# Patient Record
Sex: FEMALE | Race: WHITE | NOT HISPANIC OR LATINO | Employment: FULL TIME | URBAN - METROPOLITAN AREA
[De-identification: names, ages, dates, MRNs, and addresses within clinical notes are randomized per-mention and may not be internally consistent; named-entity substitution may affect disease eponyms.]

---

## 2017-02-13 ENCOUNTER — TRANSFERRED RECORDS (OUTPATIENT)
Dept: HEALTH INFORMATION MANAGEMENT | Facility: CLINIC | Age: 47
End: 2017-02-13

## 2018-05-18 ENCOUNTER — TRANSFERRED RECORDS (OUTPATIENT)
Dept: HEALTH INFORMATION MANAGEMENT | Facility: CLINIC | Age: 48
End: 2018-05-18

## 2018-05-23 ENCOUNTER — TRANSFERRED RECORDS (OUTPATIENT)
Dept: HEALTH INFORMATION MANAGEMENT | Facility: CLINIC | Age: 48
End: 2018-05-23

## 2018-06-25 ENCOUNTER — TRANSFERRED RECORDS (OUTPATIENT)
Dept: HEALTH INFORMATION MANAGEMENT | Facility: CLINIC | Age: 48
End: 2018-06-25

## 2018-07-25 ENCOUNTER — TRANSFERRED RECORDS (OUTPATIENT)
Dept: HEALTH INFORMATION MANAGEMENT | Facility: CLINIC | Age: 48
End: 2018-07-25

## 2019-06-13 ENCOUNTER — TRANSFERRED RECORDS (OUTPATIENT)
Dept: HEALTH INFORMATION MANAGEMENT | Facility: CLINIC | Age: 49
End: 2019-06-13

## 2019-09-06 ENCOUNTER — TRANSFERRED RECORDS (OUTPATIENT)
Dept: HEALTH INFORMATION MANAGEMENT | Facility: CLINIC | Age: 49
End: 2019-09-06

## 2019-10-04 ENCOUNTER — TRANSFERRED RECORDS (OUTPATIENT)
Dept: HEALTH INFORMATION MANAGEMENT | Facility: CLINIC | Age: 49
End: 2019-10-04

## 2020-07-28 ENCOUNTER — TRANSFERRED RECORDS (OUTPATIENT)
Dept: HEALTH INFORMATION MANAGEMENT | Facility: CLINIC | Age: 50
End: 2020-07-28

## 2020-08-04 ENCOUNTER — TRANSFERRED RECORDS (OUTPATIENT)
Dept: HEALTH INFORMATION MANAGEMENT | Facility: CLINIC | Age: 50
End: 2020-08-04

## 2020-08-05 ENCOUNTER — TRANSFERRED RECORDS (OUTPATIENT)
Dept: HEALTH INFORMATION MANAGEMENT | Facility: CLINIC | Age: 50
End: 2020-08-05

## 2020-08-06 ENCOUNTER — TRANSFERRED RECORDS (OUTPATIENT)
Dept: HEALTH INFORMATION MANAGEMENT | Facility: CLINIC | Age: 50
End: 2020-08-06
Payer: COMMERCIAL

## 2020-08-21 ENCOUNTER — TRANSFERRED RECORDS (OUTPATIENT)
Dept: HEALTH INFORMATION MANAGEMENT | Facility: CLINIC | Age: 50
End: 2020-08-21

## 2020-08-25 ENCOUNTER — TRANSFERRED RECORDS (OUTPATIENT)
Dept: HEALTH INFORMATION MANAGEMENT | Facility: CLINIC | Age: 50
End: 2020-08-25

## 2020-08-28 ENCOUNTER — TRANSFERRED RECORDS (OUTPATIENT)
Dept: HEALTH INFORMATION MANAGEMENT | Facility: CLINIC | Age: 50
End: 2020-08-28

## 2020-09-16 NOTE — TELEPHONE ENCOUNTER
RECORDS RECEIVED FROM: Right shoulder pain / self / no imaging / no surgery / [employer]   DATE RECEIVED: Sep 23, 2020     NOTES STATUS DETAILS   09/16/20   9:28 AM   No images or records  Sonja Serrano CMA

## 2020-09-18 DIAGNOSIS — M25.511 RIGHT SHOULDER PAIN, UNSPECIFIED CHRONICITY: Primary | ICD-10-CM

## 2020-09-23 ENCOUNTER — PRE VISIT (OUTPATIENT)
Dept: ORTHOPEDICS | Facility: CLINIC | Age: 50
End: 2020-09-23

## 2021-06-15 NOTE — TELEPHONE ENCOUNTER
REFERRAL INFORMATION:    Referring Provider:  N/A    Referring Clinic:  N/A    Reason for Visit/Diagnosis: Colitis      FUTURE VISIT INFORMATION:    Appointment Date: 8/19/2021    Appointment Time: 10 AM      NOTES STATUS DETAILS   OFFICE NOTE from Referring Provider N/A    OFFICE NOTE from Other Specialist Care Everywhere/ Received  8/28/19 Office visit with Dr. Dulce Maria Vargas ([facility])     8/9/15 Urgent Care visit with Dr. Harsh Kirkpatrick ([facility])     [facility]:  - 8/25/2020, 8/5/2020, 7/24/19, 9/25/18, 7/25/18 Office visit with Dr. Aleisha Peralta  - 6/25/18, 5/18/18, 2/20/17 Office visit with Dr. Kirby Pal   - 2/1/17, 3/15/16 Office visit with SHAKIRA Martinez  - 5/30/07 Office visit with Tricia Yoder PA-C     HOSPITAL DISCHARGE SUMMARY/  ED VISITS Care Everywhere 8/4/2020 ([facility])    OPERATIVE REPORT Received  Sigmoidoscopy: 5/23/18, 2/13/17 ([facility])    MEDICATION LIST Care Everywhere         ENDOSCOPY  Received  EGD: 8/21/2020 ([facility])   COLONOSCOPY Received  8/21/2020, 6/13/19, 4/23/07 ([facility])    ERCP N/A    EUS N/A    STOOL TESTING N/A    PERTINENT LABS Care Everywhere    PATHOLOGY REPORTS (RELATED) Received  8/21/2020, 6/13/19, 5/23/18, 2/13/17, 4/23/07 ([facility])      IMAGING (CT, MRI, EGD, MRCP, Small Bowel Follow Through/SBT, MR/CT Enterography) Care Everywhere/ Received  [facility]:  - XR Small Bowel: 8/6/2020    [facility]:  - CT Abdomen Pelvis: 8/4/2020     6/10/2021 10:22am Received recs from [facility]: sent to scan. Leroy     7/5/2021 1:04pm Fax request sent to [facility] and [facility] for images. -Kandis

## 2021-06-20 ENCOUNTER — HEALTH MAINTENANCE LETTER (OUTPATIENT)
Age: 51
End: 2021-06-20

## 2021-08-19 ENCOUNTER — VIRTUAL VISIT (OUTPATIENT)
Dept: GASTROENTEROLOGY | Facility: CLINIC | Age: 51
End: 2021-08-19
Payer: COMMERCIAL

## 2021-08-19 ENCOUNTER — PRE VISIT (OUTPATIENT)
Dept: GASTROENTEROLOGY | Facility: CLINIC | Age: 51
End: 2021-08-19

## 2021-08-19 VITALS — WEIGHT: 116 LBS

## 2021-08-19 DIAGNOSIS — K51.50 LEFT SIDED ULCERATIVE COLITIS WITHOUT COMPLICATION (H): Primary | ICD-10-CM

## 2021-08-19 DIAGNOSIS — K59.00 CONSTIPATION, UNSPECIFIED CONSTIPATION TYPE: ICD-10-CM

## 2021-08-19 PROCEDURE — 99204 OFFICE O/P NEW MOD 45 MIN: CPT | Mod: 95 | Performed by: INTERNAL MEDICINE

## 2021-08-19 RX ORDER — MESALAMINE 0.38 G/1
CAPSULE, EXTENDED RELEASE ORAL
COMMUNITY
Start: 2018-06-01 | End: 2021-11-17

## 2021-08-19 NOTE — LETTER
Date:November 10, 2021      Patient was self referred, no letter generated. Do not send.        [facility] Health Information

## 2021-08-19 NOTE — LETTER
"    8/19/2021         RE: Ceci Lopez  [address]  [place] [address]        Dear Colleague,    Thank you for referring your patient, Ceci Lopez, to the [facility] [place]. Please see a copy of my visit note below.    Ceci Lopez is a 50 year old female who is being evaluated via a billable video visit.      The patient has been notified of following:     \"This video visit will be conducted via a call between you and your physician/provider. We have found that certain health care needs can be provided without the need for an in-person physical exam.  This service lets us provide the care you need with a video conversation.  If a prescription is necessary we can send it directly to your pharmacy.  If lab work is needed we can place an order for that and you can then stop by our lab to have the test done at a later time.    If during the course of the call the physician/provider feels a video visit is not appropriate, you will not be charged for this service.\"     Patient confirmed that they are in [place] for today's visit yes.    Video-Visit Details  Type of service:  Video Visit    Video Start Time: 9:54 AM  Video End Time:  10:35 AM    Originating Location (pt. Location): Home    Distant Location (provider location):  [facility] [place]     Platform used: MyGoodPoints              IBD CLINIC VISIT     CC/REFERRING MD:  Referred Self  REASON FOR CONSULTATION: Ulcerative Colitis     ASSESSMENT/PLAN    IBD HISTORY  Age at diagnosis: 2005 (onset of symptoms)  Extent of disease: Left sided   Current UC medications: Apriso  Prior UC surgeries: None  Prior IBD Medications:  Canasa  Prednisone  Delzicol    DRUG MONITORING  TPMT enzyme activity: Pending    6-TGN/6-MMPN levels: ---    Biologic concentration: --    DISEASE ASSESSMENT  Labs:  No lab results found.    Invalid input(s):  ALB,  HGB  Endoscopic assessment: Last in Aug 2020 (as " below)  Enterography: None  Fecal calprotectin: Pending  C diff: No    sIBDQ:  No flowsheet data found.     #. Ulcerative colitis  At this point, likely in clinical remission. However, proctitis can also present with constipation, so will plan for repeat endoscopic assessment prior to pelvic floor center evaluation. Given prior history of aphthous ulcers in TI, will plan for complete colonoscopy instead of flex sig, particularly now that pt is now off of NSAIDs completely.  Plan to continue Apriso for now.       #. Constipation  One BM max daily with Miralax and PRN Bisacodyl.   Some mild of incomplete evacuation and straining.   No prior child births or trauma.   DDX includes pelvic floor dysfunction, will place referral         IBD Health Care Maintenance:    Vaccinations:  All patients on biologics should avoid live vaccines.    -- Influenza (every year)  -- TdaP (every 10 years)  -- Pneumococcal Pneumonia (once plus booster at 5 years)  -- Yearly assessment for latent Tb (verbal screening and exam, PPD or QuantiFERON-Tb testing)    One time confirmation of immunity or serologies:  -- Hepatitis A (serologies or immunizations)  -- Hepatitis B (serologies or immunizations)  -- Varicella  -- MMR  -- HPV (all aged 18-26)  -- Meningococcal meningitis (all patients at risk for meningitis)    Bone mineral density screening   -- Recommend all patients supplement with calcium and vitamin D  -- Given prior steroid use recommend DEXA if not already done    Cancer Screening:  Colon cancer screening:  Last colonoscopy 8/2020 with negative dysplasia surveillance.     Cervical cancer screening: Per OBGYN    Skin cancer screening: Annual visual exam of skin by dermatologist since patient is immunocompromised    Depression Screening:  -- Over the last month, have you felt down, depressed, or hopeless? --  -- Over the last month, have you felt little interest or pleasure doing things? --    Misc:  -- Avoid tobacco use  -- Avoid  NSAIDs as there is potentially a 25% chance of causing an IBD flare    Return to clinic in 3 months    Thank you for this consultation.  It was a pleasure to participate in the care of this patient; please contact us with any further questions.  I spent a total of 35 minutes, face to face, was spent with this patient, >50% of which was counseling regarding the above delineated issues.    This note was created with voice recognition software, and while reviewed for accuracy, typos may remain.     This patient was seen and staffed with my attending, Dr. Cain.    Sierra Vee MD  Gastroenterology Fellow  Pager [phone]          HPI:     Barnhart score:   Stool freq: 0 (baseline stools frequency)  Rectal bleedin (None)  PGA: 1 (Mild)  Endoscopy: 1 (Mild)    Currently has 1BM in AM if takes Miralax at night. It gets smaller and smaller each day. It's like knocking a few books off hte shelf, but not the whole shelf. She eats a lot of fiber, drinks a lot of water, and exercises a lot. She describes it as sludgy. She endorses a small bowel movement that is not satisfying. Then she ends up taking a bisacodyl once every 2 weeks and that gets things going again to have a satisfying stool.     Had colitis flare in 2018, then more consistently followed up with [facility]. Last summer had some issues, ended up in the hospital. Wanted to get a second opinion, hoping to establish care.     3 main questions:   1) Long term management - 3 years for mesalamine  2) Is it constipation? Trouble going to the bathroom regularly; she actually has to take 1.5 doses of Miralax daily for one year in order to have regular bowel movements. She has been told by her prior GI doctor that some people   3) It's been on year since last visit; any blood work? She is vegan. Any diet related advice.     In , initial onset of rectal bleeding, urgency. She was diagnosed with ulcerative proctitis, given Canasa suppositories, which resolved her  "symptoms.   In May 2018, she had a \"colitis flare.\" She was very sick, but was on prednisone. After that, she has been on mesalamine since then.   One year ago, she was hospitalized for abnormal CT findings (concern for stricture or obstruction at IC valve). A small bowel follow through did not reveal any evidence of stricture. She had a colon cleanse while in the hospital (liquid diet and enema), and felt much better.      She recently switched from Delzicol to Apriso.    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 Never pregnant, no , prolonged labor.   Do you ever have to insert a digit into     ROS:    No fevers or chills  No weight loss (weight stable - 5 years ago lost 60lbs with healthy diet and exercise)  No blurry vision, double vision or change in vision  No sore throat  No lymphadenopathy (swelling in neck)  No headache, paraesthesias, or weakness in a limb  No shortness of breath or wheezing  No chest pain or pressure  No arthralgias or myalgias  No rashes or skin changes  No odynophagia or dysphagia  No BRBPR, hematochezia, melena    Extra intestinal manifestations of IBD:   No uveitis/episcleritis  No aphthous ulcers   No arthritis   No erythema nodosum/pyoderma gangrenosum.     PERTINENT PAST MEDICAL HISTORY:  No past medical history on file.    PREVIOUS SURGERIES:  No past surgical history on file.    PREVIOUS ENDOSCOPY:  2020 EGD and Colonoscopy  EGD was normal. Gastric and duodenal biopsies were negative.    Colonoscopy showed 4 aphthous ulcers in the terminal ileum. Some minimal erythema in the distal rectum. Biopsies of the ileum revealed some nonspecific minimally active chornic ileitis " possibly due to backwash ileitis per pathologist. The rectal biopsy showed some minimally active UC, but the remainder of the colon biopsies were negative.     ALLERGIES:   No Known Allergies    PERTINENT MEDICATIONS:    Current Outpatient Medications:      mesalamine (APRISO ER) 0.375 g 24 hr capsule, , Disp: , Rfl:     SOCIAL HISTORY:  Social History     Socioeconomic History     Marital status:      Spouse name: Not on file     Number of children: Not on file     Years of education: Not on file     Highest education level: Not on file   Occupational History     Not on file   Tobacco Use     Smoking status: Never Smoker     Smokeless tobacco: Never Used   Substance and Sexual Activity     Alcohol use: Not on file     Drug use: Not on file     Sexual activity: Not on file   Other Topics Concern     Not on file   Social History Narrative     Not on file     Social Determinants of Health     Financial Resource Strain:      Difficulty of Paying Living Expenses:    Food Insecurity:      Worried About Running Out of Food in the Last Year:      Ran Out of Food in the Last Year:    Transportation Needs:      Lack of Transportation (Medical):      Lack of Transportation (Non-Medical):    Physical Activity:      Days of Exercise per Week:      Minutes of Exercise per Session:    Stress:      Feeling of Stress :    Social Connections:      Frequency of Communication with Friends and Family:      Frequency of Social Gatherings with Friends and Family:      Attends Shinto Services:      Active Member of Clubs or Organizations:      Attends Club or Organization Meetings:      Marital Status:    Intimate Partner Violence:      Fear of Current or Ex-Partner:      Emotionally Abused:      Physically Abused:      Sexually Abused:        FAMILY HISTORY:  No family history on file.    Past/family/social history reviewed and no changes    PHYSICAL EXAMINATION:  Constitutional: aaox3, cooperative, pleasant, not  dyspneic/diaphoretic, no acute distress  Vitals reviewed: Wt 52.6 kg (116 lb)   Wt:   Wt Readings from Last 2 Encounters:   08/19/21 52.6 kg (116 lb)      Eyes: Sclera anicteric/injected  Ears/nose/mouth/throat: MMM  Respiratory: Unlabored breathing  Skin: No jaundice  Psych: Normal affect      PERTINENT STUDIES:  Most recent CBC:  No lab results found.  Most recent hepatic panel:  No lab results found.    Invalid input(s): MATTHEW, ALP   Most recent creatinine:      No lab results found.        Attestation signed by Ruperto Cain MD at 8/25/2021  4:50 PM:  ATTENDING ATTESTATION:    Virtual visit for patient conducted via three way video conference with myself, patient and Dr. Vee.   I reviewed the history and abbreviated physical exam and discussed the management with Dr. Vee on 8/19/21. I reviewed the note and there are no changes to the past medical, family or social history.  A complete 10 point review of systems was obtained. Please see the HPI for pertinent positives and negatives. All other systems were reviewed and were found to be negative. I agree with the documented findings and plan of care as outlined.    UC - symptomatically doing well. Will restage. Continue current therapies    Constipation - miralax and PRN bisacodyl. Pelvic floor referral.    She agrees to the plan.    Ruperto Cain MD        Again, thank you for allowing me to participate in the care of your patient.        Sincerely,        Ruperto Cain MD

## 2021-08-19 NOTE — NURSING NOTE
Chief Complaint   Patient presents with     New Patient       Vitals:    08/19/21 0912   Weight: 52.6 kg (116 lb)       There is no height or weight on file to calculate BMI.    Anna Diaz, CMA

## 2021-08-19 NOTE — PATIENT INSTRUCTIONS
Was very nice to meet with you today during your virtual visit.  I have outlined our recommendations below.    Please go to your local [facility] clinic with the lab to get blood work drawn and to get your stool study.    My office will contact you about scheduling a colonoscopy.    My office will contact you about scheduling a referral to the pelvic floor center.    My office will contact you about scheduling your visit with a nutritionist.    Please continue to take your apprised of 4 pills daily.  If you need a refill let us know.    Please continue to take your MiraLAX.  You can consider adding daily Citrucel or Metamucil fiber powder to your regimen.    Follow-up in 3 to 4 months.

## 2021-08-19 NOTE — PROGRESS NOTES
"Ceci Lopez is a 50 year old female who is being evaluated via a billable video visit.      The patient has been notified of following:     \"This video visit will be conducted via a call between you and your physician/provider. We have found that certain health care needs can be provided without the need for an in-person physical exam.  This service lets us provide the care you need with a video conversation.  If a prescription is necessary we can send it directly to your pharmacy.  If lab work is needed we can place an order for that and you can then stop by our lab to have the test done at a later time.    If during the course of the call the physician/provider feels a video visit is not appropriate, you will not be charged for this service.\"     Patient confirmed that they are in [place] for today's visit yes.    Video-Visit Details  Type of service:  Video Visit    Video Start Time: 9:54 AM  Video End Time:  10:35 AM    Originating Location (pt. Location): Home    Distant Location (provider location):  [facility] [place]     Platform used: [employer]ett            "

## 2021-08-19 NOTE — PROGRESS NOTES
IBD CLINIC VISIT     CC/REFERRING MD:  Referred Self  REASON FOR CONSULTATION: Ulcerative Colitis     ASSESSMENT/PLAN    IBD HISTORY  Age at diagnosis: 2005 (onset of symptoms)  Extent of disease: Left sided   Current UC medications: Apriso  Prior UC surgeries: None  Prior IBD Medications:  Canasa  Prednisone  Delzicol    DRUG MONITORING  TPMT enzyme activity: Pending    6-TGN/6-MMPN levels: ---    Biologic concentration: --    DISEASE ASSESSMENT  Labs:  No lab results found.    Invalid input(s):  ALB,  HGB  Endoscopic assessment: Last in Aug 2020 (as below)  Enterography: None  Fecal calprotectin: Pending  C diff: No    sIBDQ:  No flowsheet data found.     #. Ulcerative colitis  At this point, likely in clinical remission. However, proctitis can also present with constipation, so will plan for repeat endoscopic assessment prior to pelvic floor center evaluation. Given prior history of aphthous ulcers in TI, will plan for complete colonoscopy instead of flex sig, particularly now that pt is now off of NSAIDs completely.  Plan to continue Apriso for now.       #. Constipation  One BM max daily with Miralax and PRN Bisacodyl.   Some mild of incomplete evacuation and straining.   No prior child births or trauma.   DDX includes pelvic floor dysfunction, will place referral         IBD Health Care Maintenance:    Vaccinations:  All patients on biologics should avoid live vaccines.    -- Influenza (every year)  -- TdaP (every 10 years)  -- Pneumococcal Pneumonia (once plus booster at 5 years)  -- Yearly assessment for latent Tb (verbal screening and exam, PPD or QuantiFERON-Tb testing)    One time confirmation of immunity or serologies:  -- Hepatitis A (serologies or immunizations)  -- Hepatitis B (serologies or immunizations)  -- Varicella  -- MMR  -- HPV (all aged 18-26)  -- Meningococcal meningitis (all patients at risk for meningitis)    Bone mineral density screening   -- Recommend all patients supplement with  calcium and vitamin D  -- Given prior steroid use recommend DEXA if not already done    Cancer Screening:  Colon cancer screening:  Last colonoscopy 2020 with negative dysplasia surveillance.     Cervical cancer screening: Per OBGYN    Skin cancer screening: Annual visual exam of skin by dermatologist since patient is immunocompromised    Depression Screening:  -- Over the last month, have you felt down, depressed, or hopeless? --  -- Over the last month, have you felt little interest or pleasure doing things? --    Misc:  -- Avoid tobacco use  -- Avoid NSAIDs as there is potentially a 25% chance of causing an IBD flare    Return to clinic in 3 months    Thank you for this consultation.  It was a pleasure to participate in the care of this patient; please contact us with any further questions.  I spent a total of 35 minutes, face to face, was spent with this patient, >50% of which was counseling regarding the above delineated issues.    This note was created with voice recognition software, and while reviewed for accuracy, typos may remain.     This patient was seen and staffed with my attending, Dr. Cain.    Sierra Vee MD  Gastroenterology Fellow  Pager [phone]          HPI:     Barnhart score:   Stool freq: 0 (baseline stools frequency)  Rectal bleedin (None)  PGA: 1 (Mild)  Endoscopy: 1 (Mild)    Currently has 1BM in AM if takes Miralax at night. It gets smaller and smaller each day. It's like knocking a few books off hte shelf, but not the whole shelf. She eats a lot of fiber, drinks a lot of water, and exercises a lot. She describes it as sludgy. She endorses a small bowel movement that is not satisfying. Then she ends up taking a bisacodyl once every 2 weeks and that gets things going again to have a satisfying stool.     Had colitis flare in 2018, then more consistently followed up with [place] [facility]. Last summer had some issues, ended up in the hospital. Wanted to get a second opinion, hoping to establish  "care.     3 main questions:   1) Long term management - 3 years for mesalamine  2) Is it constipation? Trouble going to the bathroom regularly; she actually has to take 1.5 doses of Miralax daily for one year in order to have regular bowel movements. She has been told by her prior GI doctor that some people   3) It's been on year since last visit; any blood work? She is vegan. Any diet related advice.     In , initial onset of rectal bleeding, urgency. She was diagnosed with ulcerative proctitis, given Canasa suppositories, which resolved her symptoms.   In May 2018, she had a \"colitis flare.\" She was very sick, but was on prednisone. After that, she has been on mesalamine since then.   One year ago, she was hospitalized for abnormal CT findings (concern for stricture or obstruction at IC valve). A small bowel follow through did not reveal any evidence of stricture. She had a colon cleanse while in the hospital (liquid diet and enema), and felt much better.      She recently switched from Delzicol to Apriso.    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 Never pregnant, no , prolonged labor.   Do you ever have to insert a digit into     ROS:    No fevers or chills  No weight loss (weight stable - 5 years ago lost 60lbs with healthy diet and exercise)  No blurry vision, double vision or change in vision  No sore throat  No lymphadenopathy (swelling in neck)  No headache, paraesthesias, or weakness in a limb  No shortness of breath or wheezing  No chest pain or pressure  No arthralgias or myalgias  No rashes or skin changes  No odynophagia or dysphagia  No BRBPR, hematochezia, melena    Extra intestinal manifestations of " IBD:   No uveitis/episcleritis  No aphthous ulcers   No arthritis   No erythema nodosum/pyoderma gangrenosum.     PERTINENT PAST MEDICAL HISTORY:  No past medical history on file.    PREVIOUS SURGERIES:  No past surgical history on file.    PREVIOUS ENDOSCOPY:  August 21, 2020 EGD and Colonoscopy  EGD was normal. Gastric and duodenal biopsies were negative.    Colonoscopy showed 4 aphthous ulcers in the terminal ileum. Some minimal erythema in the distal rectum. Biopsies of the ileum revealed some nonspecific minimally active chornic ileitis possibly due to backwash ileitis per pathologist. The rectal biopsy showed some minimally active UC, but the remainder of the colon biopsies were negative.     ALLERGIES:   No Known Allergies    PERTINENT MEDICATIONS:    Current Outpatient Medications:      mesalamine (APRISO ER) 0.375 g 24 hr capsule, , Disp: , Rfl:     SOCIAL HISTORY:  Social History     Socioeconomic History     Marital status:      Spouse name: Not on file     Number of children: Not on file     Years of education: Not on file     Highest education level: Not on file   Occupational History     Not on file   Tobacco Use     Smoking status: Never Smoker     Smokeless tobacco: Never Used   Substance and Sexual Activity     Alcohol use: Not on file     Drug use: Not on file     Sexual activity: Not on file   Other Topics Concern     Not on file   Social History Narrative     Not on file     Social Determinants of Health     Financial Resource Strain:      Difficulty of Paying Living Expenses:    Food Insecurity:      Worried About Running Out of Food in the Last Year:      Ran Out of Food in the Last Year:    Transportation Needs:      Lack of Transportation (Medical):      Lack of Transportation (Non-Medical):    Physical Activity:      Days of Exercise per Week:      Minutes of Exercise per Session:    Stress:      Feeling of Stress :    Social Connections:      Frequency of Communication with Friends  and Family:      Frequency of Social Gatherings with Friends and Family:      Attends Sabianist Services:      Active Member of Clubs or Organizations:      Attends Club or Organization Meetings:      Marital Status:    Intimate Partner Violence:      Fear of Current or Ex-Partner:      Emotionally Abused:      Physically Abused:      Sexually Abused:        FAMILY HISTORY:  No family history on file.    Past/family/social history reviewed and no changes    PHYSICAL EXAMINATION:  Constitutional: aaox3, cooperative, pleasant, not dyspneic/diaphoretic, no acute distress  Vitals reviewed: Wt 52.6 kg (116 lb)   Wt:   Wt Readings from Last 2 Encounters:   08/19/21 52.6 kg (116 lb)      Eyes: Sclera anicteric/injected  Ears/nose/mouth/throat: MMM  Respiratory: Unlabored breathing  Skin: No jaundice  Psych: Normal affect      PERTINENT STUDIES:  Most recent CBC:  No lab results found.  Most recent hepatic panel:  No lab results found.    Invalid input(s): MATTHEW, ALP   Most recent creatinine:      No lab results found.

## 2021-08-20 ENCOUNTER — TELEPHONE (OUTPATIENT)
Dept: INTERNAL MEDICINE | Facility: CLINIC | Age: 51
End: 2021-08-20

## 2021-08-20 NOTE — TELEPHONE ENCOUNTER
Nutrition Education Scheduling Outreach #1:    Call to patient to schedule. Left message with phone number to call to schedule.    Plan for 2nd outreach attempt within 1 week.    Lavinia Johnson  [place] [facility]  Diabetes and Nutrition Scheduling

## 2021-08-23 ENCOUNTER — TELEPHONE (OUTPATIENT)
Dept: GASTROENTEROLOGY | Facility: CLINIC | Age: 51
End: 2021-08-23

## 2021-08-23 NOTE — TELEPHONE ENCOUNTER
Screening Questions  1. Are you active on mychart? yes    2. What insurance is in the chart? BCBS    2.  Ordering/Referring Provider: Sierra Vee MD    3. BMI 20.9    4. Are you on daily home oxygen? n    5. Do you have a history of difficult airway? n    6. Have you had a heart, lung, or liver transplant? n    7. Are you currently on dialysis? n    8. Have you had a stroke or Transient ischemic atttack (TIA) within 6 months? n    9. In the past 6 months, have you had any heart related issues including cardiomyopathy or heart attack?         If yes, did it require cardiac stenting or other implantable device?n    10. Do you have any implantable devices in your body (pacemaker, defib, LVAD)? n    11. Do you take nitroglycerin? If yes, how often? n    12. Are you currently taking any blood thinners?n    13. Are you a diabetic? n    14. (Females) Are you currently pregnant? n  If yes, how many weeks?    15. Have you had a procedure in the past that was difficult to tolerate with conscious sedation? Any allergies to Fentanyl or Versed n    16. Are you taking any scheduled prescription narcotics more than once daily? n    17. Do you have any chemical dependencies such as alcohol, street drugs, or methadone? n    18. Do you have any history of post-traumatic stress syndrome or mental health issues? n    19. Do you transfer independently? y    20.  Do you have any issues with constipation? n    21. Preferred Pharmacy for Pre Prescription [facility]    Scheduling Details    Which Colonoscopy Prep was Sent?: m-prep  Procedure Scheduled: Colonoscopy  Provider/Surgeon: Ant  Date of Procedure: 9/29  Location: [facility]  Caller (Please ask for phone number if not scheduled by patient): Ceci      Sedation Type: MAC  Conscious Sedation- Needs  for 6 hours after the procedure  MAC/General-Needs  for 24 hours after procedure    Pre-op Required at [facility] [place], [place] and OR for MAC sedation:    (if yes advise patient they will need a pre-op prior to procedure)      Is patient on blood thinners? -n (If yes- inform patient to follow up with PCP or provider for follow up instructions)     Informed patient they will need an adult  y  Cannot take any type of public or medical transportation alone    Informed Patient of COVID Test Requirement y    Confirmed Nurse will call to complete assessment y    Additional comments:

## 2021-08-25 ENCOUNTER — LAB (OUTPATIENT)
Dept: LAB | Facility: CLINIC | Age: 51
End: 2021-08-25
Payer: COMMERCIAL

## 2021-08-25 DIAGNOSIS — K51.50 LEFT SIDED ULCERATIVE COLITIS WITHOUT COMPLICATION (H): ICD-10-CM

## 2021-08-25 LAB
BASOPHILS # BLD AUTO: 0 10E3/UL (ref 0–0.2)
BASOPHILS NFR BLD AUTO: 0 %
EOSINOPHIL # BLD AUTO: 0 10E3/UL (ref 0–0.7)
EOSINOPHIL NFR BLD AUTO: 1 %
ERYTHROCYTE [DISTWIDTH] IN BLOOD BY AUTOMATED COUNT: 12.5 % (ref 10–15)
FOLATE SERPL-MCNC: 50.5 NG/ML
HBV CORE AB SERPL QL IA: NONREACTIVE
HBV SURFACE AB SERPL IA-ACNC: 740.94 M[IU]/ML
HBV SURFACE AG SERPL QL IA: NONREACTIVE
HCT VFR BLD AUTO: 36.9 % (ref 35–47)
HGB BLD-MCNC: 12 G/DL (ref 11.7–15.7)
IMM GRANULOCYTES # BLD: 0 10E3/UL
IMM GRANULOCYTES NFR BLD: 0 %
LYMPHOCYTES # BLD AUTO: 1.2 10E3/UL (ref 0.8–5.3)
LYMPHOCYTES NFR BLD AUTO: 14 %
MCH RBC QN AUTO: 30.9 PG (ref 26.5–33)
MCHC RBC AUTO-ENTMCNC: 32.5 G/DL (ref 31.5–36.5)
MCV RBC AUTO: 95 FL (ref 78–100)
MONOCYTES # BLD AUTO: 0.4 10E3/UL (ref 0–1.3)
MONOCYTES NFR BLD AUTO: 4 %
NEUTROPHILS # BLD AUTO: 7.1 10E3/UL (ref 1.6–8.3)
NEUTROPHILS NFR BLD AUTO: 81 %
PLATELET # BLD AUTO: 268 10E3/UL (ref 150–450)
RBC # BLD AUTO: 3.88 10E6/UL (ref 3.8–5.2)
WBC # BLD AUTO: 8.8 10E3/UL (ref 4–11)

## 2021-08-25 PROCEDURE — 86704 HEP B CORE ANTIBODY TOTAL: CPT | Mod: 90 | Performed by: PATHOLOGY

## 2021-08-25 PROCEDURE — 82746 ASSAY OF FOLIC ACID SERUM: CPT

## 2021-08-25 PROCEDURE — 82607 VITAMIN B-12: CPT

## 2021-08-25 PROCEDURE — 80050 GENERAL HEALTH PANEL: CPT

## 2021-08-25 PROCEDURE — 83550 IRON BINDING TEST: CPT

## 2021-08-25 PROCEDURE — 36415 COLL VENOUS BLD VENIPUNCTURE: CPT | Performed by: PATHOLOGY

## 2021-08-25 PROCEDURE — 86140 C-REACTIVE PROTEIN: CPT

## 2021-08-25 PROCEDURE — 99000 SPECIMEN HANDLING OFFICE-LAB: CPT

## 2021-08-25 PROCEDURE — 87340 HEPATITIS B SURFACE AG IA: CPT

## 2021-08-25 PROCEDURE — 82728 ASSAY OF FERRITIN: CPT

## 2021-08-25 PROCEDURE — 86706 HEP B SURFACE ANTIBODY: CPT | Mod: 90 | Performed by: PATHOLOGY

## 2021-08-25 PROCEDURE — 82657 ENZYME CELL ACTIVITY: CPT | Mod: 90

## 2021-08-26 ENCOUNTER — DOCUMENTATION ONLY (OUTPATIENT)
Dept: GASTROENTEROLOGY | Facility: CLINIC | Age: 51
End: 2021-08-26

## 2021-08-26 ENCOUNTER — MEDICAL CORRESPONDENCE (OUTPATIENT)
Dept: HEALTH INFORMATION MANAGEMENT | Facility: CLINIC | Age: 51
End: 2021-08-26

## 2021-08-26 ENCOUNTER — PATIENT OUTREACH (OUTPATIENT)
Dept: GASTROENTEROLOGY | Facility: CLINIC | Age: 51
End: 2021-08-26

## 2021-08-26 LAB
ALBUMIN SERPL-MCNC: 3.7 G/DL (ref 3.4–5)
ALP SERPL-CCNC: 67 U/L (ref 40–150)
ALT SERPL W P-5'-P-CCNC: 31 U/L (ref 0–50)
ANION GAP SERPL CALCULATED.3IONS-SCNC: 5 MMOL/L (ref 3–14)
AST SERPL W P-5'-P-CCNC: 22 U/L (ref 0–45)
BILIRUB SERPL-MCNC: 0.2 MG/DL (ref 0.2–1.3)
BUN SERPL-MCNC: 9 MG/DL (ref 7–30)
CALCIUM SERPL-MCNC: 8.8 MG/DL (ref 8.5–10.1)
CHLORIDE BLD-SCNC: 107 MMOL/L (ref 94–109)
CO2 SERPL-SCNC: 27 MMOL/L (ref 20–32)
CREAT SERPL-MCNC: 0.84 MG/DL (ref 0.52–1.04)
CRP SERPL-MCNC: <2.9 MG/L (ref 0–8)
FERRITIN SERPL-MCNC: 16 NG/ML (ref 8–252)
GFR SERPL CREATININE-BSD FRML MDRD: 82 ML/MIN/1.73M2
GLUCOSE BLD-MCNC: 75 MG/DL (ref 70–99)
IRON SATN MFR SERPL: 25 % (ref 15–46)
IRON SERPL-MCNC: 87 UG/DL (ref 35–180)
POTASSIUM BLD-SCNC: 4.3 MMOL/L (ref 3.4–5.3)
PROT SERPL-MCNC: 7.7 G/DL (ref 6.8–8.8)
SODIUM SERPL-SCNC: 139 MMOL/L (ref 133–144)
TIBC SERPL-MCNC: 343 UG/DL (ref 240–430)
TSH SERPL DL<=0.005 MIU/L-ACNC: 0.86 MU/L (ref 0.4–4)
VIT B12 SERPL-MCNC: 1119 PG/ML (ref 193–986)

## 2021-08-26 NOTE — PROGRESS NOTES
Left a message for patient to call back to discuss instructions   In basket to team to start pelvic floor referral  Also a my chart message

## 2021-08-26 NOTE — PROGRESS NOTES
[facility] order and chart notes faxed to [phone].    Referral form sent to scanning.       Jah King LPN

## 2021-08-27 NOTE — PROGRESS NOTES
"[facility] Outpatient Medical Nutrition Therapy      Ceci is a 50 year old who is being evaluated via a billable video visit.      How would you like to obtain your AVS? MyChart  If the video visit is dropped, the invitation should be resent by: Send to e-mail at: [email or web address]  Will anyone else be joining your video visit? No      Video Start Time: 7:00 AM     Video-Visit Details    Type of service:  Video Visit    Video End Time: 8:12 AM    Originating Location (pt. Location): Home    Distant Location (provider location):  [facility] [place]     Platform used for Video Visit: Infinite Z    During this virtual visit the patient is located in [place], patient verifies this as the location during the entirety of this visit.     Brett Gonzalez, PhD, RD    Additional provider notes:  Referring Physician: Ant  Reason for RD Visit: IBD     Nutrition Plan: Trial increased intake of legumes/lentils as primary protein source vs soy-based products    Recommendations for MD/Provider to order: None at this time    Malnutrition Diagnosis: Patient does not meet the criteria necessary for diagnosing malnutrition    Nutrition Assessment:  Patient is here for intial visit with Registered Dietitian (RD). Patient is a 50 year old female with history of ulcerative colitis (left sided) diagnosed in 2007 recently seen by Dr. Cain 8/19/2021 at which time she was noted to be in clinical remission and was also managing constipation with Miralax and PRN Bisacodyl. Today is interested in discussing how diet might influence inflammation and flares as well as need to take Miralax daily even with what she feels is a relatively high fiber intake. Diet recall reflects vegan diet (supplemented with calcium, vitamin D, B12, and iron) with moderate fiber intake.    Past Surgical History: No IBD surgical history    Symptom Review  1. Nausea/vomiting? No  2. Heartburn? No  3. Bloating? Yes: Sometimes will \"feel a " "little more bloated\"  4. Belching? NA  5. Feeling full quickly? No  6. Decreased appetite? No  7. Weight loss/gain? Yes: Stable. Successful weight loss 5 years ago with plant-based diet. Now weight stable  8. Constipation/Diarrhea? Yes: Sometimes. Takes dose of Miralax nightly, but after a few days starts to feel increased bloating and then will take laxative tablet  9. Urgency? NA  10. Incomplete Bowel Emptying? NA  11. Abdominal pain/pain with or without eating? No  12. Feeling tired? NA    IBD-Q Score History  No flowsheet data found.    Dietary beliefs and Practices  1.  Did you modify your diet leading up to diagnosis OR Have you modified your diet since diagnosis? Not for UC specifically, but diet changes for overall health and weight loss/maintenance: Avoids alcohol; concentrated sweets; doesn't add oil to foods; vegan  2.  Do you believe that certain foods increase the risk of developing IBD? NA  3.  Do you believe that food/nutrition is an important part of your disease management? NA  4.  Are there specific foods you avoid? NA  5.  Do you believe that specific foods can cause relapse? NA  6.  Are there specific foods or food groups that you feel help? (symptoms/relapse) NA  7.  Do you avoid some foods or food groups for fear of worsening the disease flare? NA  8.  Have you received prior advice on diet?  No   A. If so, source? NA  9.  Have you, or do you follow, a specific diet?  Yes   A. Which one(s)?  Vegan   B. Did/Do you find it beneficial?  NA    I. If able to articulate, what specifically is the benefit? NA  10.  Do you take any vitamin, mineral, or herbal supplements? Yes: Calcium and vitamin D; sublingual B12; iron supplement (\"blood builder\" - takes every evening); digestive enzyme every other morning alternates with pascual kombucha  11.  Do you use any calorie/protein supplements?  No  12.  Do you think IBD has influenced your pleasure in eating? NA  13.  Do you feel stressed or anxious about " eating? If so why? NA  14.  Do you avoid eating in social settings (e.g. Restaurants)? NA    Diet Recall:  (Typical Day)  Meal Name Time Food    Breakfast  Americano with mix of oat and cashew milk AND steel cut oats (1/4 cup/serving) + 2 tsp flaxseed meal + 2 cups berries (blueberries/blackberries)          Second coffee        Lunch  Sweet potato + shredded cabbage + tahini sauce        Dinner  Soy curls with homemade cauliflower rice AND refried beans AND 1/2 avocado            Dates with walnuts        Snacks  NA   Beverages  Coffee, water, kombucha   Alcohol   None   --Might have tofu or tempeh with lunch  --Has trialed eating 2 kiwis/day; sometimes will eat berry smoothie with spinach  --Regular consumption of legumes/lentils/chickpeas    Frequency of eating/taking out meals: More frequent with covid restrictions lifting (still tries to stick to whole grain and vegetarian)  Food access/availability: Fine  Food preparation confidence/abilities: Fine    Anthropometrics:   Height: Data Unavailable  Weight: 116-118 pounds  BMI: There is no height or weight on file to calculate BMI.    Weight History:  Wt Readings from Last 10 Encounters:   08/19/21 52.6 kg (116 lb)     Usual Weight: 116-118 pounds  Weight change in past 6 months: Stable    Labs: Reviewed  Pertinent Medications/vitamin and mineral supplements:  Current Outpatient Medications   Medication     mesalamine (APRISO ER) 0.375 g 24 hr capsule     No current facility-administered medications for this visit.       Food Allergies: None  Food Intolerances: None  Physical Activity: Regularly  Estimated Nutrition Needs based on most recent body weight of 116 lb: 1300 calories (25 kcals/kg), 50 g protein (1.0 g/kg), ~1 ml/kcal or total fluids per MD    MALNUTRITION:  % Weight Loss:  None noted  % Intake:  No decreased intake noted  Subcutaneous Fat Loss:  None observed  Muscle Loss:  None observed  Fluid Retention:  None noted    Nutrition Diagnosis:    Food and  nutrition related knowledge deficit related to IBD as evidenced by need for diet education.    Nutrition Prescription: Regular diet    Nutrition Intervention:    Nutrition Education/Counseling:  Discussed diet and IBD history. Reviewed components of common diets currently proposed for IBD: Autoimmune protocol diet, IBD Anti-Inflammatory diet, Specific carbohydrate diet, Semi-vegetarian diet. Discussed that there is limited data on diet in IBD, but that these diets have been followed with some success in IBD. Highlighted that these all have a similar general theme geared towards more of a plant-based, less-processed diet composition. Introduced the IBD Anti-inflammatory diet food list. Discussed that certain aspects of the diet (e.g. gluten free, dairy free) may be overly restrictive, but that the food list seems to provide a reasonable texture-based diet advancement that can help guide food reintroduction. Discussed increasing intake of prebiotic/soluble fiber containing foods (e.g. ground flaxseed, debra seeds) and consuming a more consistent intake of fiber throughout the day.     Educational Materials Provided: None at this time  Patient verbalized understanding of education provided. See all recommendations under Goals.    Goals:  1. Trial a week of a more heavy reliance on lentils/legumes/chickpeas for protein in place of soy-based products to increase fiber intake    Nutrition Monitoring and Evaluation: Will monitor adherence to nutrition recommendations at future RD visits.     Further Medical Nutrition Therapy: Yes  Next Appointment (if applicable): 5-6 months  Patient was encouraged to call/contact RD with any further questions.

## 2021-08-30 ENCOUNTER — VIRTUAL VISIT (OUTPATIENT)
Dept: GASTROENTEROLOGY | Facility: CLINIC | Age: 51
End: 2021-08-30
Payer: COMMERCIAL

## 2021-08-30 DIAGNOSIS — K51.50 LEFT SIDED ULCERATIVE COLITIS WITHOUT COMPLICATION (H): Primary | ICD-10-CM

## 2021-08-30 DIAGNOSIS — Z71.3 NUTRITIONAL COUNSELING: ICD-10-CM

## 2021-08-30 DIAGNOSIS — K59.00 CONSTIPATION, UNSPECIFIED CONSTIPATION TYPE: ICD-10-CM

## 2021-08-30 LAB — TPMT BLD-CCNC: 28.5 U/ML

## 2021-08-30 PROCEDURE — 97802 MEDICAL NUTRITION INDIV IN: CPT | Mod: 95 | Performed by: DIETITIAN, REGISTERED

## 2021-08-30 NOTE — PATIENT INSTRUCTIONS
Karl Ornelas,    It was great meeting you today. Below is a summary of what we discussed:    1. Trial a week of a more heavy reliance on lentils/legumes/chickpeas for protein in place of soy-based products to increase fiber intake    2. Continue current diet    Best regards,  Brett Gonzalez, PhD, RD

## 2021-08-30 NOTE — LETTER
"    8/30/2021         RE: Ceci Lopez  [address]  [place] [address]        Dear Colleague,    Thank you for referring your patient, Ceci Lopez, to the [facility] [place]. Please see a copy of my visit note below.    [facility] Outpatient Medical Nutrition Therapy      Referring Physician: Ant  Reason for RD Visit: IBD     Nutrition Plan: Trial increased intake of legumes/lentils as primary protein source vs soy-based products    Recommendations for MD/Provider to order: None at this time    Malnutrition Diagnosis: Patient does not meet the criteria necessary for diagnosing malnutrition    Nutrition Assessment:  Patient is here for intial visit with Registered Dietitian (RD). Patient is a 50 year old female with history of ulcerative colitis (left sided) diagnosed in 2007 recently seen by Dr. Cain 8/19/2021 at which time she was noted to be in clinical remission and was also managing constipation with Miralax and PRN Bisacodyl. Today is interested in discussing how diet might influence inflammation and flares as well as need to take Miralax daily even with what she feels is a relatively high fiber intake. Diet recall reflects vegan diet (supplemented with calcium, vitamin D, B12, and iron) with moderate fiber intake.    Past Surgical History: No IBD surgical history    Symptom Review  1. Nausea/vomiting? No  2. Heartburn? No  3. Bloating? Yes: Sometimes will \"feel a little more bloated\"  4. Belching? NA  5. Feeling full quickly? No  6. Decreased appetite? No  7. Weight loss/gain? Yes: Stable. Successful weight loss 5 years ago with plant-based diet. Now weight stable  8. Constipation/Diarrhea? Yes: Sometimes. Takes dose of Miralax nightly, but after a few days starts to feel increased bloating and then will take laxative tablet  9. Urgency? NA  10. Incomplete Bowel Emptying? NA  11. Abdominal pain/pain with or without eating? No  12. Feeling tired? NA    IBD-Q Score " "History  No flowsheet data found.    Dietary beliefs and Practices  1.  Did you modify your diet leading up to diagnosis OR Have you modified your diet since diagnosis? Not for UC specifically, but diet changes for overall health and weight loss/maintenance: Avoids alcohol; concentrated sweets; doesn't add oil to foods; vegan  2.  Do you believe that certain foods increase the risk of developing IBD? NA  3.  Do you believe that food/nutrition is an important part of your disease management? NA  4.  Are there specific foods you avoid? NA  5.  Do you believe that specific foods can cause relapse? NA  6.  Are there specific foods or food groups that you feel help? (symptoms/relapse) NA  7.  Do you avoid some foods or food groups for fear of worsening the disease flare? NA  8.  Have you received prior advice on diet?  No   A. If so, source? NA  9.  Have you, or do you follow, a specific diet?  Yes   A. Which one(s)?  Vegan   B. Did/Do you find it beneficial?  NA    I. If able to articulate, what specifically is the benefit? NA  10.  Do you take any vitamin, mineral, or herbal supplements? Yes: Calcium and vitamin D; sublingual B12; iron supplement (\"blood builder\" - takes every evening); digestive enzyme every other morning alternates with pascual kombucha  11.  Do you use any calorie/protein supplements?  No  12.  Do you think IBD has influenced your pleasure in eating? NA  13.  Do you feel stressed or anxious about eating? If so why? NA  14.  Do you avoid eating in social settings (e.g. Restaurants)? NA    Diet Recall:  (Typical Day)  Meal Name Time Food    Breakfast  Americano with mix of oat and cashew milk AND steel cut oats (1/4 cup/serving) + 2 tsp flaxseed meal + 2 cups berries (blueberries/blackberries)          Second coffee        Lunch  Sweet potato + shredded cabbage + tahini sauce        Dinner  Soy curls with homemade cauliflower rice AND refried beans AND 1/2 avocado            Dates with walnuts      "   Snacks  NA   Beverages  Coffee, water, kombucha   Alcohol   None   --Might have tofu or tempeh with lunch  --Has trialed eating 2 kiwis/day; sometimes will eat berry smoothie with spinach  --Regular consumption of legumes/lentils/chickpeas    Frequency of eating/taking out meals: More frequent with covid restrictions lifting (still tries to stick to whole grain and vegetarian)  Food access/availability: Fine  Food preparation confidence/abilities: Fine    Anthropometrics:   Height: Data Unavailable  Weight: 116-118 pounds  BMI: There is no height or weight on file to calculate BMI.    Weight History:  Wt Readings from Last 10 Encounters:   08/19/21 52.6 kg (116 lb)     Usual Weight: 116-118 pounds  Weight change in past 6 months: Stable    Labs: Reviewed  Pertinent Medications/vitamin and mineral supplements:  Current Outpatient Medications   Medication     mesalamine (APRISO ER) 0.375 g 24 hr capsule     No current facility-administered medications for this visit.       Food Allergies: None  Food Intolerances: None  Physical Activity: Regularly  Estimated Nutrition Needs based on most recent body weight of 116 lb: 1300 calories (25 kcals/kg), 50 g protein (1.0 g/kg), ~1 ml/kcal or total fluids per MD    MALNUTRITION:  % Weight Loss:  None noted  % Intake:  No decreased intake noted  Subcutaneous Fat Loss:  None observed  Muscle Loss:  None observed  Fluid Retention:  None noted    Nutrition Diagnosis:    Food and nutrition related knowledge deficit related to IBD as evidenced by need for diet education.    Nutrition Prescription: Regular diet    Nutrition Intervention:    Nutrition Education/Counseling:  Discussed diet and IBD history. Reviewed components of common diets currently proposed for IBD: Autoimmune protocol diet, IBD Anti-Inflammatory diet, Specific carbohydrate diet, Semi-vegetarian diet. Discussed that there is limited data on diet in IBD, but that these diets have been followed with some success in  IBD. Highlighted that these all have a similar general theme geared towards more of a plant-based, less-processed diet composition. Introduced the IBD Anti-inflammatory diet food list. Discussed that certain aspects of the diet (e.g. gluten free, dairy free) may be overly restrictive, but that the food list seems to provide a reasonable texture-based diet advancement that can help guide food reintroduction. Discussed increasing intake of prebiotic/soluble fiber containing foods (e.g. ground flaxseed, debra seeds) and consuming a more consistent intake of fiber throughout the day.     Educational Materials Provided: None at this time  Patient verbalized understanding of education provided. See all recommendations under Goals.    Goals:  1. Trial a week of a more heavy reliance on lentils/legumes/chickpeas for protein in place of soy-based products to increase fiber intake    Nutrition Monitoring and Evaluation: Will monitor adherence to nutrition recommendations at future RD visits.     Further Medical Nutrition Therapy: Yes  Next Appointment (if applicable): 5-6 months  Patient was encouraged to call/contact RD with any further questions.        Again, thank you for allowing me to participate in the care of your patient.      Sincerely,    Brett Gonzalez RD

## 2021-09-01 ENCOUNTER — DOCUMENTATION ONLY (OUTPATIENT)
Dept: GASTROENTEROLOGY | Facility: CLINIC | Age: 51
End: 2021-09-01

## 2021-09-01 DIAGNOSIS — Z11.59 ENCOUNTER FOR SCREENING FOR OTHER VIRAL DISEASES: ICD-10-CM

## 2021-09-01 NOTE — PROGRESS NOTES
Called the [facility] to follow up on referral -faxed on 8/26/21. Per [facility], referral was received. Per [facility], calls were made out to Pt x2, VMs left for Pt to call back. No call back yet. Letter was sent out to follow up.    Jah King LPN

## 2021-09-02 ENCOUNTER — IMMUNIZATION (OUTPATIENT)
Dept: NURSING | Facility: CLINIC | Age: 51
End: 2021-09-02
Payer: COMMERCIAL

## 2021-09-02 PROCEDURE — 91300 PR COVID VAC PFIZER DIL RECON 30 MCG/0.3 ML IM: CPT

## 2021-09-02 PROCEDURE — 0003A PR COVID VAC PFIZER DIL RECON 30 MCG/0.3 ML IM: CPT

## 2021-09-21 ENCOUNTER — TELEPHONE (OUTPATIENT)
Dept: GASTROENTEROLOGY | Facility: CLINIC | Age: 51
End: 2021-09-21

## 2021-09-21 NOTE — TELEPHONE ENCOUNTER
Caller: Ceci Lopez    Procedure: Colon    Date of Procedure Cancelled: 9/29/2021    Ordering Provider: Sierra Vee MD    Reason for cancel: Pt had a death in the family     Any Staff messages sent regarding case?: NO                  Recipient and Sender:  & *                  Message Details:       Rescheduled: YES     If rescheduled:    Date: 10/18/2021   Location: [facility] [place]   Note any change or update to original order/sedation: NO

## 2021-10-08 ENCOUNTER — TELEPHONE (OUTPATIENT)
Dept: GASTROENTEROLOGY | Facility: CLINIC | Age: 51
End: 2021-10-08

## 2021-10-08 DIAGNOSIS — K51.50 LEFT SIDED ULCERATIVE COLITIS WITHOUT COMPLICATION (H): Primary | ICD-10-CM

## 2021-10-08 RX ORDER — BISACODYL 5 MG
TABLET, DELAYED RELEASE (ENTERIC COATED) ORAL
Qty: 2 TABLET | Refills: 0 | Status: SHIPPED | OUTPATIENT
Start: 2021-10-08 | End: 2021-11-17

## 2021-10-08 NOTE — TELEPHONE ENCOUNTER
Writer reviewed pre-assessment questions with patient prior to upcoming colonoscopy on 10.18.2021.      Covid test scheduled: 10.16.2021    Arrival time: 0630    Facility location: [facility]    Sedation type: MAC    Electronic Implantable devices? No    Blood thinners/Antiplatelet medication? No    Reviewed extended prep instructions with patient.  No fiber/iron supplements or foods that contain nuts/seeds 7 days prior to procedure.     Designated  policy reviewed with patient.     Patient verbalized understanding.  No further questions or concerns.    Seema Rubio RN

## 2021-10-08 NOTE — TELEPHONE ENCOUNTER
"Attempted to contact patient regarding upcoming colonoscopy procedure on 10.18.2021 for pre assessment questions. No answer.     Left message to return call to [phone] #2    Covid test scheduled: 10.16.2021    Arrival time: 0630    Facility location: [facility]    Sedation type: MAC    Bowel prep recommendation: verify bowel habits.  Pt stated \"no\" on scheduling questionnaire for constipation however GI order and OV note constipation.  Is patient taking miralax daily?  Is pt using PRN bisacodyl tablets?    Seema Rubio RN        "

## 2021-10-11 ENCOUNTER — HEALTH MAINTENANCE LETTER (OUTPATIENT)
Age: 51
End: 2021-10-11

## 2021-10-16 ENCOUNTER — LAB (OUTPATIENT)
Dept: LAB | Facility: CLINIC | Age: 51
End: 2021-10-16
Payer: COMMERCIAL

## 2021-10-16 DIAGNOSIS — Z11.59 ENCOUNTER FOR SCREENING FOR OTHER VIRAL DISEASES: ICD-10-CM

## 2021-10-16 PROCEDURE — U0005 INFEC AGEN DETEC AMPLI PROBE: HCPCS

## 2021-10-16 PROCEDURE — U0003 INFECTIOUS AGENT DETECTION BY NUCLEIC ACID (DNA OR RNA); SEVERE ACUTE RESPIRATORY SYNDROME CORONAVIRUS 2 (SARS-COV-2) (CORONAVIRUS DISEASE [COVID-19]), AMPLIFIED PROBE TECHNIQUE, MAKING USE OF HIGH THROUGHPUT TECHNOLOGIES AS DESCRIBED BY CMS-2020-01-R: HCPCS

## 2021-10-17 ENCOUNTER — ANESTHESIA EVENT (OUTPATIENT)
Dept: SURGERY | Facility: AMBULATORY SURGERY CENTER | Age: 51
End: 2021-10-17
Payer: COMMERCIAL

## 2021-10-17 LAB — SARS-COV-2 RNA RESP QL NAA+PROBE: NEGATIVE

## 2021-10-18 ENCOUNTER — ANESTHESIA (OUTPATIENT)
Dept: SURGERY | Facility: AMBULATORY SURGERY CENTER | Age: 51
End: 2021-10-18
Payer: COMMERCIAL

## 2021-10-18 ENCOUNTER — HOSPITAL ENCOUNTER (OUTPATIENT)
Facility: AMBULATORY SURGERY CENTER | Age: 51
End: 2021-10-18
Attending: INTERNAL MEDICINE
Payer: COMMERCIAL

## 2021-10-18 VITALS
TEMPERATURE: 97.3 F | SYSTOLIC BLOOD PRESSURE: 107 MMHG | OXYGEN SATURATION: 100 % | DIASTOLIC BLOOD PRESSURE: 71 MMHG | RESPIRATION RATE: 14 BRPM

## 2021-10-18 LAB — COLONOSCOPY: NORMAL

## 2021-10-18 PROCEDURE — 88305 TISSUE EXAM BY PATHOLOGIST: CPT | Mod: TC | Performed by: INTERNAL MEDICINE

## 2021-10-18 PROCEDURE — 88305 TISSUE EXAM BY PATHOLOGIST: CPT | Mod: 26 | Performed by: PATHOLOGY

## 2021-10-18 PROCEDURE — 45380 COLONOSCOPY AND BIOPSY: CPT

## 2021-10-18 RX ORDER — PROCHLORPERAZINE MALEATE 10 MG
10 TABLET ORAL EVERY 6 HOURS PRN
Status: DISCONTINUED | OUTPATIENT
Start: 2021-10-18 | End: 2021-10-19 | Stop reason: HOSPADM

## 2021-10-18 RX ORDER — LIDOCAINE HYDROCHLORIDE 20 MG/ML
INJECTION, SOLUTION INFILTRATION; PERINEURAL PRN
Status: DISCONTINUED | OUTPATIENT
Start: 2021-10-18 | End: 2021-10-18

## 2021-10-18 RX ORDER — FLUMAZENIL 0.1 MG/ML
0.2 INJECTION, SOLUTION INTRAVENOUS
Status: ACTIVE | OUTPATIENT
Start: 2021-10-18 | End: 2021-10-18

## 2021-10-18 RX ORDER — NALOXONE HYDROCHLORIDE 0.4 MG/ML
0.4 INJECTION, SOLUTION INTRAMUSCULAR; INTRAVENOUS; SUBCUTANEOUS
Status: DISCONTINUED | OUTPATIENT
Start: 2021-10-18 | End: 2021-10-19 | Stop reason: HOSPADM

## 2021-10-18 RX ORDER — PROPOFOL 10 MG/ML
INJECTION, EMULSION INTRAVENOUS CONTINUOUS PRN
Status: DISCONTINUED | OUTPATIENT
Start: 2021-10-18 | End: 2021-10-18

## 2021-10-18 RX ORDER — ONDANSETRON 4 MG/1
4 TABLET, ORALLY DISINTEGRATING ORAL EVERY 6 HOURS PRN
Status: DISCONTINUED | OUTPATIENT
Start: 2021-10-18 | End: 2021-10-19 | Stop reason: HOSPADM

## 2021-10-18 RX ORDER — LIDOCAINE 40 MG/G
CREAM TOPICAL
Status: DISCONTINUED | OUTPATIENT
Start: 2021-10-18 | End: 2021-10-18 | Stop reason: HOSPADM

## 2021-10-18 RX ORDER — ONDANSETRON 2 MG/ML
4 INJECTION INTRAMUSCULAR; INTRAVENOUS EVERY 6 HOURS PRN
Status: DISCONTINUED | OUTPATIENT
Start: 2021-10-18 | End: 2021-10-19 | Stop reason: HOSPADM

## 2021-10-18 RX ORDER — NALOXONE HYDROCHLORIDE 0.4 MG/ML
0.2 INJECTION, SOLUTION INTRAMUSCULAR; INTRAVENOUS; SUBCUTANEOUS
Status: DISCONTINUED | OUTPATIENT
Start: 2021-10-18 | End: 2021-10-19 | Stop reason: HOSPADM

## 2021-10-18 RX ORDER — SIMETHICONE
LIQUID (ML) MISCELLANEOUS PRN
Status: DISCONTINUED | OUTPATIENT
Start: 2021-10-18 | End: 2021-10-18 | Stop reason: HOSPADM

## 2021-10-18 RX ORDER — SODIUM CHLORIDE, SODIUM LACTATE, POTASSIUM CHLORIDE, CALCIUM CHLORIDE 600; 310; 30; 20 MG/100ML; MG/100ML; MG/100ML; MG/100ML
500 INJECTION, SOLUTION INTRAVENOUS CONTINUOUS
Status: DISCONTINUED | OUTPATIENT
Start: 2021-10-18 | End: 2021-10-18 | Stop reason: HOSPADM

## 2021-10-18 RX ORDER — ONDANSETRON 2 MG/ML
4 INJECTION INTRAMUSCULAR; INTRAVENOUS
Status: DISCONTINUED | OUTPATIENT
Start: 2021-10-18 | End: 2021-10-18 | Stop reason: HOSPADM

## 2021-10-18 RX ADMIN — SODIUM CHLORIDE, SODIUM LACTATE, POTASSIUM CHLORIDE, CALCIUM CHLORIDE: 600; 310; 30; 20 INJECTION, SOLUTION INTRAVENOUS at 07:48

## 2021-10-18 RX ADMIN — LIDOCAINE HYDROCHLORIDE 40 MG: 20 INJECTION, SOLUTION INFILTRATION; PERINEURAL at 07:50

## 2021-10-18 RX ADMIN — PROPOFOL 150 MCG/KG/MIN: 10 INJECTION, EMULSION INTRAVENOUS at 07:49

## 2021-10-18 NOTE — ANESTHESIA PREPROCEDURE EVALUATION
Anesthesia Pre-Procedure Evaluation    Patient: Ceci Lopez   MRN: [id number] : 1970        Preoperative Diagnosis: Left sided ulcerative colitis without complication (H) [K51.50]    Procedure : Procedure(s):  COLONOSCOPY          No past medical history on file.   History reviewed. No pertinent surgical history.   No Known Allergies   Social History     Tobacco Use     Smoking status: Never Smoker     Smokeless tobacco: Never Used   Substance Use Topics     Alcohol use: Not on file      Wt Readings from Last 1 Encounters:   21 52.6 kg (116 lb)        Anesthesia Evaluation   Pt has had prior anesthetic. Type: MAC.    No history of anesthetic complications       ROS/MED HX  ENT/Pulmonary:  - neg pulmonary ROS     Neurologic:  - neg neurologic ROS     Cardiovascular:    (-) hypertension, irregular heartbeat/palpitations and murmur   METS/Exercise Tolerance: >4 METS    Hematologic:  - neg hematologic  ROS     Musculoskeletal:  - neg musculoskeletal ROS     GI/Hepatic:     (+) Inflammatory bowel disease,  (-) GERD   Renal/Genitourinary:    (-) renal disease   Endo:    (-) Type II DM   Psychiatric/Substance Use:  - neg psychiatric ROS     Infectious Disease:  - neg infectious disease ROS     Malignancy:       Other:     (-) Any chance pregnant       Physical Exam    Airway        Mallampati: I   TM distance: > 3 FB   Neck ROM: full   Mouth opening: > 3 cm    Respiratory Devices and Support         Dental  no notable dental history         Cardiovascular          Rhythm and rate: regular and normal (-) no murmur    Pulmonary           breath sounds clear to auscultation           OUTSIDE LABS:  CBC:   Lab Results   Component Value Date    WBC 8.8 2021    HGB 12.0 2021    HCT 36.9 2021     2021     BMP:   Lab Results   Component Value Date     2021    POTASSIUM 4.3 2021    CHLORIDE 107 2021    CO2 27 2021    BUN 9 2021    CR 0.84  08/25/2021    GLC 75 08/25/2021     COAGS: No results found for: PTT, INR, FIBR  POC: No results found for: BGM, HCG, HCGS  HEPATIC:   Lab Results   Component Value Date    ALBUMIN 3.7 08/25/2021    PROTTOTAL 7.7 08/25/2021    ALT 31 08/25/2021    AST 22 08/25/2021    ALKPHOS 67 08/25/2021    BILITOTAL 0.2 08/25/2021     OTHER:   Lab Results   Component Value Date    CARMEL 8.8 08/25/2021    TSH 0.86 08/25/2021    CRP <2.9 08/25/2021       Anesthesia Plan    ASA Status:  2   NPO Status:  NPO Appropriate    Anesthesia Type: MAC.     - Reason for MAC: immobility needed   Induction: Intravenous.   Maintenance: TIVA.        Consents    Anesthesia Plan(s) and associated risks, benefits, and realistic alternatives discussed. Questions answered and patient/representative(s) expressed understanding.     - Discussed with:  Patient      - Extended Intubation/Ventilatory Support Discussed: No.      - Patient is DNR/DNI Status: No    Use of blood products discussed: No .     Postoperative Care       PONV prophylaxis: Background Propofol Infusion     Comments:                Collin Gary MD

## 2021-10-18 NOTE — ANESTHESIA CARE TRANSFER NOTE
Patient: Ceci Lopez    Procedure: Procedure(s):  COLONOSCOPY, WITH BIOPSY       Diagnosis: Left sided ulcerative colitis without complication (H) [K51.50]  Diagnosis Additional Information: No value filed.    Anesthesia Type:   MAC     Note:  Anesthesia Care Transfer Notewriter  Vitals:  Vitals Value Taken Time   BP 98/56    Temp     Pulse 52    Resp 20    SpO2 99        Electronically Signed By: MATT Colindres CRNA  October 18, 2021  8:24 AM

## 2021-10-18 NOTE — H&P
Ceci Lopez  [id number]  female  50 year old      Reason for procedure/surgery: colon cancer screening. Ulcerative colitis    There is no problem list on file for this patient.      Past Surgical History:  History reviewed. No pertinent surgical history.    Past Medical History: No past medical history on file.    Social History:   Social History     Tobacco Use     Smoking status: Never Smoker     Smokeless tobacco: Never Used   Substance Use Topics     Alcohol use: Not on file       Family History: History reviewed. No pertinent family history.    Allergies: No Known Allergies    Active Medications:   Current Outpatient Medications   Medication Sig Dispense Refill     bisacodyl (DULCOLAX) 5 MG EC tablet Take 2 tablets by mouth at 10am the day before procedure. 2 tablet 0     magnesium citrate solution Drink entire bottle at 4pm two days prior to procedure. 296 mL 0     mesalamine (APRISO ER) 0.375 g 24 hr capsule        polyethylene glycol (GOLYTELY) 236 g suspension Day before procedure: Start drinking the Golytely solution at 3pm.  Drink one, eight oz. glass every 10-15 minutes until half of the 1st container of Golytely is gone.  At 8pm drink the second half of the 1st container of Golytely.  Drink one, eight oz. glass every 10-15 minutes until gone.  Before you go to bed, mix the 2nd container of Golytely.  Day of procedure:  6 hours before your check-in time @ 12:30am, drink one, eight oz. glass every 10-15 minutes until half of the 2nd  container of Golytely is gone. Discard the remainder of solution. 8000 mL 0       Systemic Review:   CONSTITUTIONAL: NEGATIVE for fever, chills, change in weight  ENT/MOUTH: NEGATIVE for ear, mouth and throat problems  RESP: NEGATIVE for significant cough or SOB  CV: NEGATIVE for chest pain, palpitations or peripheral edema    Physical Examination:   Vital Signs: /77   Temp 98.3  F (36.8  C) (Temporal)   Resp 16   LMP 10/10/2021 (Exact Date)   SpO2 100%    GENERAL: healthy, alert and no distress  NECK: no adenopathy, no asymmetry, masses, or scars  RESP: lungs clear to auscultation - no rales, rhonchi or wheezes  CV: regular rate and rhythm, normal S1 S2, no S3 or S4, no murmur, click or rub, no peripheral edema and peripheral pulses strong  ABDOMEN: soft, nontender, no hepatosplenomegaly, no masses and bowel sounds normal  MS: no gross musculoskeletal defects noted, no edema    Plan: Appropriate to proceed as scheduled.      Ruperto Cain MD  10/18/2021    PCP:  No Ref-Primary, Physician

## 2021-10-18 NOTE — ANESTHESIA POSTPROCEDURE EVALUATION
Patient: Ceci Lopez    Procedure: Procedure(s):  COLONOSCOPY, WITH BIOPSY       Diagnosis:Left sided ulcerative colitis without complication (H) [K51.50]  Diagnosis Additional Information: No value filed.    Anesthesia Type:  MAC    Note:  Disposition: Outpatient   Postop Pain Control: Uneventful            Sign Out: Well controlled pain   PONV: No   Neuro/Psych: Uneventful            Sign Out: Acceptable/Baseline neuro status   Airway/Respiratory: Uneventful            Sign Out: Acceptable/Baseline resp. status   CV/Hemodynamics: Uneventful            Sign Out: Acceptable CV status; No obvious hypovolemia; No obvious fluid overload   Other NRE: NONE   DID A NON-ROUTINE EVENT OCCUR? No           Last vitals:  Vitals Value Taken Time   /71 10/18/21 0848   Temp 36.3  C (97.3  F) 10/18/21 0848   Pulse     Resp 14 10/18/21 0848   SpO2 100 % 10/18/21 0848       Electronically Signed By: Jacque Schumacher MD  October 18, 2021  10:57 AM

## 2021-10-19 LAB
PATH REPORT.COMMENTS IMP SPEC: NORMAL
PATH REPORT.COMMENTS IMP SPEC: NORMAL
PATH REPORT.FINAL DX SPEC: NORMAL
PATH REPORT.GROSS SPEC: NORMAL
PATH REPORT.MICROSCOPIC SPEC OTHER STN: NORMAL
PATH REPORT.RELEVANT HX SPEC: NORMAL
PHOTO IMAGE: NORMAL

## 2021-10-21 ENCOUNTER — CARE COORDINATION (OUTPATIENT)
Dept: GASTROENTEROLOGY | Facility: CLINIC | Age: 51
End: 2021-10-21

## 2021-10-21 DIAGNOSIS — K52.9 INFLAMMATORY BOWEL DISEASE: Primary | ICD-10-CM

## 2021-11-08 ENCOUNTER — TRANSFERRED RECORDS (OUTPATIENT)
Dept: HEALTH INFORMATION MANAGEMENT | Facility: CLINIC | Age: 51
End: 2021-11-08
Payer: COMMERCIAL

## 2021-11-10 ENCOUNTER — HOSPITAL ENCOUNTER (OUTPATIENT)
Dept: MRI IMAGING | Facility: CLINIC | Age: 51
Discharge: HOME | End: 2021-11-10
Attending: INTERNAL MEDICINE | Admitting: INTERNAL MEDICINE
Payer: COMMERCIAL

## 2021-11-10 DIAGNOSIS — K52.9 INFLAMMATORY BOWEL DISEASE: ICD-10-CM

## 2021-11-10 PROCEDURE — A9585 GADOBUTROL INJECTION: HCPCS | Performed by: INTERNAL MEDICINE

## 2021-11-10 PROCEDURE — 74183 MRI ABD W/O CNTR FLWD CNTR: CPT

## 2021-11-10 PROCEDURE — 255N000002 HC RX 255 OP 636: Performed by: INTERNAL MEDICINE

## 2021-11-10 PROCEDURE — 74183 MRI ABD W/O CNTR FLWD CNTR: CPT | Mod: 26 | Performed by: RADIOLOGY

## 2021-11-10 PROCEDURE — 72197 MRI PELVIS W/O & W/DYE: CPT | Mod: 26 | Performed by: RADIOLOGY

## 2021-11-10 RX ORDER — GADOBUTROL 604.72 MG/ML
7.5 INJECTION INTRAVENOUS ONCE
Status: COMPLETED | OUTPATIENT
Start: 2021-11-10 | End: 2021-11-10

## 2021-11-10 RX ADMIN — GADOBUTROL 7.5 ML: 604.72 INJECTION INTRAVENOUS at 17:38

## 2021-11-17 ENCOUNTER — VIRTUAL VISIT (OUTPATIENT)
Dept: GASTROENTEROLOGY | Facility: CLINIC | Age: 51
End: 2021-11-17
Payer: COMMERCIAL

## 2021-11-17 ENCOUNTER — PATIENT OUTREACH (OUTPATIENT)
Dept: GASTROENTEROLOGY | Facility: CLINIC | Age: 51
End: 2021-11-17

## 2021-11-17 VITALS — BODY MASS INDEX: 20.55 KG/M2 | HEIGHT: 63 IN | WEIGHT: 116 LBS

## 2021-11-17 DIAGNOSIS — K50.90 CROHN'S DISEASE (H): Primary | ICD-10-CM

## 2021-11-17 DIAGNOSIS — K50.80 CROHN'S DISEASE OF BOTH SMALL AND LARGE INTESTINE WITHOUT COMPLICATION (H): Primary | ICD-10-CM

## 2021-11-17 PROCEDURE — 99214 OFFICE O/P EST MOD 30 MIN: CPT | Mod: 95 | Performed by: INTERNAL MEDICINE

## 2021-11-17 RX ORDER — EPINEPHRINE 1 MG/ML
0.3 INJECTION, SOLUTION, CONCENTRATE INTRAVENOUS EVERY 5 MIN PRN
Status: CANCELLED | OUTPATIENT
Start: 2021-11-17

## 2021-11-17 RX ORDER — DIPHENHYDRAMINE HYDROCHLORIDE 50 MG/ML
50 INJECTION INTRAMUSCULAR; INTRAVENOUS
Status: CANCELLED
Start: 2021-11-17

## 2021-11-17 RX ORDER — MESALAMINE 0.38 G/1
1.5 CAPSULE, EXTENDED RELEASE ORAL DAILY
Qty: 360 CAPSULE | Refills: 3 | Status: SHIPPED | OUTPATIENT
Start: 2021-11-17 | End: 2022-11-09

## 2021-11-17 RX ORDER — MEPERIDINE HYDROCHLORIDE 25 MG/ML
25 INJECTION INTRAMUSCULAR; INTRAVENOUS; SUBCUTANEOUS EVERY 30 MIN PRN
Status: CANCELLED | OUTPATIENT
Start: 2021-11-17

## 2021-11-17 RX ORDER — ALBUTEROL SULFATE 90 UG/1
1-2 AEROSOL, METERED RESPIRATORY (INHALATION)
Status: CANCELLED
Start: 2021-11-17

## 2021-11-17 RX ORDER — ALBUTEROL SULFATE 0.83 MG/ML
2.5 SOLUTION RESPIRATORY (INHALATION)
Status: CANCELLED | OUTPATIENT
Start: 2021-11-17

## 2021-11-17 RX ORDER — NALOXONE HYDROCHLORIDE 0.4 MG/ML
0.2 INJECTION, SOLUTION INTRAMUSCULAR; INTRAVENOUS; SUBCUTANEOUS
Status: CANCELLED | OUTPATIENT
Start: 2021-11-17

## 2021-11-17 RX ORDER — METHYLPREDNISOLONE SODIUM SUCCINATE 125 MG/2ML
125 INJECTION, POWDER, LYOPHILIZED, FOR SOLUTION INTRAMUSCULAR; INTRAVENOUS
Status: CANCELLED
Start: 2021-11-17

## 2021-11-17 ASSESSMENT — ENCOUNTER SYMPTOMS
BLOOD IN STOOL: 0
DIARRHEA: 0
RECTAL PAIN: 0
BOWEL INCONTINENCE: 0
CONSTIPATION: 1
ABDOMINAL PAIN: 0
NAUSEA: 0
VOMITING: 0
HEARTBURN: 0
BLOATING: 0
JAUNDICE: 0

## 2021-11-17 ASSESSMENT — MIFFLIN-ST. JEOR: SCORE: 1125.3

## 2021-11-17 ASSESSMENT — PAIN SCALES - GENERAL: PAINLEVEL: NO PAIN (0)

## 2021-11-17 NOTE — LETTER
11/17/2021         RE: Ceci Lopez  [address]  [place] [address]        Dear Colleague,    Thank you for referring your patient, Ceci Lopez, to the [facility]. Please see a copy of my visit note below.    Ceci is a 50 year old who is being evaluated via a billable video visit.      How would you like to obtain your AVS? MyChart  If the video visit is dropped, the invitation should be resent by: Text to cell phone: [phone]  Will anyone else be joining your video visit? No      Video Start Time: 3:24 PM  Video-Visit Details    Type of service:  Video Visit    Video End Time:3:54 PM    Originating Location (pt. Location): Home    Distant Location (provider location):  [facility]     Platform used for Video Visit: [employer]  Answers for HPI/ROS submitted by the patient on 11/17/2021  General Symptoms: No  Skin Symptoms: No  HENT Symptoms: No  EYE SYMPTOMS: No  HEART SYMPTOMS: No  LUNG SYMPTOMS: No  INTESTINAL SYMPTOMS: Yes  URINARY SYMPTOMS: No  GYNECOLOGIC SYMPTOMS: No  BREAST SYMPTOMS: No  SKELETAL SYMPTOMS: No  BLOOD SYMPTOMS: No  NERVOUS SYSTEM SYMPTOMS: No  MENTAL HEALTH SYMPTOMS: No  Heart burn or indigestion: No  Nausea: No  Vomiting: No  Abdominal pain: No  Bloating: No  Constipation: Yes  Diarrhea: No  Blood in stool: No  Black stools: No  Rectal or Anal pain: No  Fecal incontinence: No  Yellowing of skin or eyes: No  Vomit with blood: No  Change in stools: No        IBD CLINIC VISIT    CC/REFERRING MD:  Referred Self    REASON FOR CONSULTATION: follow up IBD    ASSESSMENT/PLAN:    1. Crohn's ileo-colitis - colonoscopy was consistent with this. We discussed mesalamine is not adequate treatment for this. Discussed options of biologics (anti-TNF, anti-interleukin, and anti-integrin therapies). Risks and benefits of each discussed. She prefers ustekinumab. We will see if insurance approves.    -seek PA for  ustekinumab  -if cannot get this will then try infliximab or adalimumab  -continue apriso for now  -check TB quant    2. Constipation - well managed now with miralax. We are awaiting pelvic floor center records. As she is doing well I think it is ok to hold off on biofeedback/PT for now. May consider again in the future.      IBD HISTORY  Age at diagnosis: 2005  Extent of disease: initially left sided - however most recent colonoscopy shows ileitis and ascending colitis. Biopsies with inflammation throughout colon except descending/sigmoid  Disease phenotype: inflammatory  Ruthy-anal disease: none  Current CD medications: Apriso  Prior IBD surgeries: none  Prior IBD Medications: other mesalamine    DRUG MONITORING  TPMT enzyme activity: 28 (WNL)    6-TGN/6-MMPN levels: --    Biologic concentration: --    DISEASE ASSESSMENT  Labs  Recent Labs   Lab Test 08/25/21  1110   CRP <2.9     Fecal calprotectin: None  Endoscopy:   August 21, 2020 EGD and Colonoscopy  EGD was normal. Gastric and duodenal biopsies were negative.     Colonoscopy showed 4 aphthous ulcers in the terminal ileum. Some minimal erythema in the distal rectum. Biopsies of the ileum revealed some nonspecific minimally active chornic ileitis possibly due to backwash ileitis per pathologist. The rectal biopsy showed some minimally active UC, but the remainder of the colon biopsies were negative.     Colonscopy 10/2021 - as above - ulceration in ileum. Biopsies chronic active ileitis. Mild inflammation in ascending colon. More c/w Crohn's.  Enterography: MRE 11/10/21  1. Minimal wall thickening and minimal patchy increased enhancement in  the terminal ileum suggests very mild ileitis. Remainder of the small  bowel is normal.  2. Myomatous uterus.  C diff: None    sIBDQ:   IBDQ Score Date IBDQ - Total Score  (Higher score better)   11/17/2021 63       IBD Health Care Maintenance:    Vaccinations:  All patients on biologics should avoid live vaccines.    --  Influenza (every year)  -- TdaP (every 10 years)  -- Pneumococcal Pneumonia (once plus booster at 5 years)  -- Yearly assessment for latent Tb (verbal screening and exam, PPD or QuantiFERON-Tb testing)     One time confirmation of immunity or serologies:  -- Hepatitis A (serologies or immunizations)  -- Hepatitis B (serologies or immunizations)  -- Varicella  -- MMR  -- HPV (all aged 18-26)  -- Meningococcal meningitis (all patients at risk for meningitis)     Bone mineral density screening   -- Recommend all patients supplement with calcium and vitamin D  -- Given prior steroid use recommend DEXA if not already done     Cancer Screening:  Colon cancer screening:  Last colonoscopy 8/2020 with negative dysplasia surveillance.      Cervical cancer screening: Per OBGYN     Skin cancer screening: Annual visual exam of skin by dermatologist since patient is immunocompromised     Depression Screening:  -- Over the last month, have you felt down, depressed, or hopeless? --  -- Over the last month, have you felt little interest or pleasure doing things? --     Misc:  -- Avoid tobacco use  -- Avoid NSAIDs as there is potentially a 25% chance of causing an IBD flare    Return to clinic in 4 months    Thank you for this consultation.  It was a pleasure to participate in the care of this patient; please contact us with any further questions.      This note was created with voice recognition software, and while reviewed for accuracy, typos may remain.     Ruperto Cain MD  Division of Gastroenterology, Hepatology and Nutrition  [facility]  Pager: [phone]      HPI:   Currently, here to follow up colonoscopy.    Continues to feel well. Only issue is constipation that is well managed with miralax.    She went to pelvic floor center and was told overall things looked good. May have been one small abnormality - she cannot remember what it was. Was offered biofeedback. However, she wants to hold off as she is doing well.  May consider again in future.    No diarrhea, bleeding, abd pain. No EIM    ROS:    No fevers or chills  No weight loss  No blurry vision, double vision or change in vision  No sore throat  No lymphadenopathy  No headache, paraesthesias, or weakness in a limb  No shortness of breath or wheezing  No chest pain or pressure  No arthralgias or myalgias  No rashes or skin changes  No odynophagia or dysphagia  No BRBPR, hematochezia, melena  No dysuria, frequency or urgency  No hot/cold intolerance or polyria  No anxiety or depression    Extra intestinal manifestations of IBD:  No uveitis/episcleritis  No aphthous ulcers   No arthritis   No erythema nodosum/pyoderma gangrenosum.     PERTINENT PAST MEDICAL HISTORY:  IBD as above  Constipation    PREVIOUS SURGERIES:  Past Surgical History:   Procedure Laterality Date     COLONOSCOPY N/A 10/18/2021    Procedure: COLONOSCOPY, WITH BIOPSY;  Surgeon: Ruperto Cain MD;  Location: [facility] OR       PREVIOUS ENDOSCOPY:  Results for orders placed or performed during the hospital encounter of 10/18/21   COLONOSCOPY   Result Value Ref Range    COLONOSCOPY       [facility]  [address] [place] [address], [place] [address] [phone]     Endoscopy Department  _______________________________________________________________________________  Patient Name: Ceci Lopez            Procedure Date: 10/18/2021 6:44 AM  MRN: [id number]                       Account Number: [id number]  YOB: 1970             Admit Type: Outpatient  Age: 50                               Room: Pro 5  Gender: Female                        Note Status: Finalized  Attending MD: Ruperto Cain MD    Total Sedation Time:   _______________________________________________________________________________     Procedure:           Colonoscopy  Indications:         51 YO F with history of left sided ulcerative colitis.                        Doing well on mesalamine (symptomatic remission).  Prior                        colonoscopy in 2020 showed aphthae ulcers in TI - ?                        backwash ileitis. Colonoscopy today to restage disease                         as she is establishing care in our clinic.  Providers:           Ruperto Cain MD, Barbara Mesa, Argentina West MD:        Ruperto Cain MD  Medicines:           Monitored Anesthesia Care  Complications:       No immediate complications.  _______________________________________________________________________________  Procedure:           Pre-Anesthesia Assessment:                       - See EPIC H and P note                       After obtaining informed consent, the colonoscope was                        passed under direct vision. Throughout the procedure,                        the patient's blood pressure, pulse, and oxygen                        saturations were monitored continuously. The Colonoscope                        was introduced through the anus and advanced to the                        terminal ileum, with identification of the appendiceal                        orifice and IC valve. The colonoscopy was per formed                        without difficulty. The patient tolerated the procedure                        well. The quality of the bowel preparation was evaluated                        using the BBPS ([place] Bowel Preparation Scale) with                        scores of: Right Colon = 3, Transverse Colon = 3 and                        Left Colon = 3 (entire mucosa seen well with no residual                        staining, small fragments of stool or opaque liquid).                        The total BBPS score equals 9.                                                                                   Findings:       The perianal and digital rectal examinations were normal.       The terminal ileum contained multiple ulcers (largest 10 mm).        Serpigenous. No  bleeding was present. No stigmata of recent bleeding        were seen. Biopsies were taken with a cold forceps for histology.       The terminal ileum contained multiple scattered non-bleeding aphthae. No        stigmata of  recent bleeding were seen. Mucosa was biopsied with a cold        forceps for histology.       Inflammation characterized by congestion (edema), erosions, erythema and        friability was found as patches in the ascending colon. Biopsied as part        of the cecum/ascending jar. The rectum, the sigmoid colon, the        descending colon and the transverse colon were spared. This was mild in        severity, and when compared to previous examinations, the findings are        worsened.       Four biopsies were taken every 10 cm with a cold forceps from the entire        colon for dysplasia surveillance. These biopsy specimens from the        cecum/ascending, transverse colon, descending/sigmoid colon and rectum        were sent to Pathology.       The exam was otherwise without abnormality on direct and retroflexion        views.                                                                                   Impression:          - Multiple ulcers in the terminal ileum. Biopsied.                        - Aphtha in the terminal ileum. Biopsied.                       - Inflammatory bowel disease. Inflammation was found in                        the ascending colon. This was mild in severity, worsened                        compared to previous examinations.                       - The examination was otherwise normal on direct and                        retroflexion views.                       - Biopsies for surveillance were taken from the entire                        colon.                       - Inflammation in the ileum appeared more than backwash                        ileitis. Looked more consistent with Crohn's Disease                        (moderate). Will follow up in clinic to  discuss                        treatment options.  Recommendation:      - Discharge patient to home (with escort).                       - Await pathology results.                       - Return to GI clinic as previously scheduled.                       - Will arrange MRE before  follow up                                                                                       ____________________  Ruperto Cain MD  10/18/2021 8:30:35 AM  I was physically present for the entire viewing portion of the exam.  __________________________  Signature of teaching physician  Ruperto Cain MD  Number of Addenda: 0    Note Initiated On: 10/18/2021 6:44 AM  Scope In:  Scope Out:     ]    ALLERGIES:   No Known Allergies    PERTINENT MEDICATIONS:    Current Outpatient Medications:      mesalamine (APRISO ER) 0.375 g 24 hr capsule, Take 4 capsules (1.5 g) by mouth daily, Disp: 360 capsule, Rfl: 3     bisacodyl (DULCOLAX) 5 MG EC tablet, Take 2 tablets by mouth at 10am the day before procedure., Disp: 2 tablet, Rfl: 0     magnesium citrate solution, Drink entire bottle at 4pm two days prior to procedure., Disp: 296 mL, Rfl: 0     polyethylene glycol (GOLYTELY) 236 g suspension, Day before procedure: Start drinking the Golytely solution at 3pm.  Drink one, eight oz. glass every 10-15 minutes until half of the 1st container of Golytely is gone.  At 8pm drink the second half of the 1st container of Golytely.  Drink one, eight oz. glass every 10-15 minutes until gone.  Before you go to bed, mix the 2nd container of Golytely.  Day of procedure:  6 hours before your check-in time @ 12:30am, drink one, eight oz. glass every 10-15 minutes until half of the 2nd  container of Golytely is gone. Discard the remainder of solution., Disp: 8000 mL, Rfl: 0    SOCIAL HISTORY:  Social History     Socioeconomic History     Marital status:      Spouse name: Not on file     Number of children: Not on file     Years of education: Not on file  "    Highest education level: Not on file   Occupational History     Not on file   Tobacco Use     Smoking status: Never Smoker     Smokeless tobacco: Never Used   Substance and Sexual Activity     Alcohol use: Not on file     Drug use: Not on file     Sexual activity: Not on file   Other Topics Concern     Not on file   Social History Narrative     Not on file     Social Determinants of Health     Financial Resource Strain: Not on file   Food Insecurity: Not on file   Transportation Needs: Not on file   Physical Activity: Not on file   Stress: Not on file   Social Connections: Not on file   Intimate Partner Violence: Not on file   Housing Stability: Not on file       FAMILY HISTORY:  No family history on file.    Past/family/social history reviewed and no changes    PHYSICAL EXAMINATION:  Constitutional: aaox3, cooperative, pleasant, not dyspneic/diaphoretic, no acute distress  Vitals reviewed: Ht 1.6 m (5' 3\")   Wt 52.6 kg (116 lb)   BMI 20.55 kg/m    Wt:   Wt Readings from Last 2 Encounters:   11/17/21 52.6 kg (116 lb)   08/19/21 52.6 kg (116 lb)      Eyes: Sclera anicteric/injected  Respiratory: Unlabored breathing  Skin: no jaundice  Psych: Normal affect    PERTINENT STUDIES:  Most recent CBC:  Recent Labs   Lab Test 08/25/21  1111   WBC 8.8   HGB 12.0   HCT 36.9        Most recent hepatic panel:  Recent Labs   Lab Test 08/25/21  1110   ALT 31   AST 22     Most recent creatinine:  Recent Labs   Lab Test 08/25/21  1110   CR 0.84             Answers for HPI/ROS submitted by the patient on 11/17/2021  General Symptoms: No  Skin Symptoms: No  HENT Symptoms: No  EYE SYMPTOMS: No  HEART SYMPTOMS: No  LUNG SYMPTOMS: No  INTESTINAL SYMPTOMS: Yes  URINARY SYMPTOMS: No  GYNECOLOGIC SYMPTOMS: No  BREAST SYMPTOMS: No  SKELETAL SYMPTOMS: No  BLOOD SYMPTOMS: No  NERVOUS SYSTEM SYMPTOMS: No  MENTAL HEALTH SYMPTOMS: No  Heart burn or indigestion: No  Nausea: No  Vomiting: No  Abdominal pain: No  Bloating: " No  Constipation: Yes  Diarrhea: No  Blood in stool: No  Black stools: No  Rectal or Anal pain: No  Fecal incontinence: No  Yellowing of skin or eyes: No  Vomit with blood: No  Change in stools: No          Again, thank you for allowing me to participate in the care of your patient.        Sincerely,        Ruperto Cain MD

## 2021-11-17 NOTE — PROGRESS NOTES
IBD CLINIC VISIT    CC/REFERRING MD:  Referred Self    REASON FOR CONSULTATION: follow up IBD    ASSESSMENT/PLAN:    1. Crohn's ileo-colitis - colonoscopy was consistent with this. We discussed mesalamine is not adequate treatment for this. Discussed options of biologics (anti-TNF, anti-interleukin, and anti-integrin therapies). Risks and benefits of each discussed. She prefers ustekinumab. We will see if insurance approves.    -seek PA for ustekinumab  -if cannot get this will then try infliximab or adalimumab  -continue apriso for now  -check TB quant    2. Constipation - well managed now with miralax. We are awaiting pelvic floor center records. As she is doing well I think it is ok to hold off on biofeedback/PT for now. May consider again in the future.      IBD HISTORY  Age at diagnosis: 2005  Extent of disease: initially left sided - however most recent colonoscopy shows ileitis and ascending colitis. Biopsies with inflammation throughout colon except descending/sigmoid  Disease phenotype: inflammatory  Ruthy-anal disease: none  Current CD medications: Apriso  Prior IBD surgeries: none  Prior IBD Medications: other mesalamine    DRUG MONITORING  TPMT enzyme activity: 28 (WNL)    6-TGN/6-MMPN levels: --    Biologic concentration: --    DISEASE ASSESSMENT  Labs  Recent Labs   Lab Test 08/25/21  1110   CRP <2.9     Fecal calprotectin: None  Endoscopy:   August 21, 2020 EGD and Colonoscopy  EGD was normal. Gastric and duodenal biopsies were negative.     Colonoscopy showed 4 aphthous ulcers in the terminal ileum. Some minimal erythema in the distal rectum. Biopsies of the ileum revealed some nonspecific minimally active chornic ileitis possibly due to backwash ileitis per pathologist. The rectal biopsy showed some minimally active UC, but the remainder of the colon biopsies were negative.     Colonscopy 10/2021 - as above - ulceration in ileum. Biopsies chronic active ileitis. Mild inflammation in ascending colon.  More c/w Crohn's.  Enterography: MRE 11/10/21  1. Minimal wall thickening and minimal patchy increased enhancement in  the terminal ileum suggests very mild ileitis. Remainder of the small  bowel is normal.  2. Myomatous uterus.  C diff: None    sIBDQ:   IBDQ Score Date IBDQ - Total Score  (Higher score better)   11/17/2021 63       IBD Health Care Maintenance:    Vaccinations:  All patients on biologics should avoid live vaccines.    -- Influenza (every year)  -- TdaP (every 10 years)  -- Pneumococcal Pneumonia (once plus booster at 5 years)  -- Yearly assessment for latent Tb (verbal screening and exam, PPD or QuantiFERON-Tb testing)     One time confirmation of immunity or serologies:  -- Hepatitis A (serologies or immunizations)  -- Hepatitis B (serologies or immunizations)  -- Varicella  -- MMR  -- HPV (all aged 18-26)  -- Meningococcal meningitis (all patients at risk for meningitis)     Bone mineral density screening   -- Recommend all patients supplement with calcium and vitamin D  -- Given prior steroid use recommend DEXA if not already done     Cancer Screening:  Colon cancer screening:  Last colonoscopy 8/2020 with negative dysplasia surveillance.      Cervical cancer screening: Per OBGYN     Skin cancer screening: Annual visual exam of skin by dermatologist since patient is immunocompromised     Depression Screening:  -- Over the last month, have you felt down, depressed, or hopeless? --  -- Over the last month, have you felt little interest or pleasure doing things? --     Misc:  -- Avoid tobacco use  -- Avoid NSAIDs as there is potentially a 25% chance of causing an IBD flare    Return to clinic in 4 months    Thank you for this consultation.  It was a pleasure to participate in the care of this patient; please contact us with any further questions.      This note was created with voice recognition software, and while reviewed for accuracy, typos may remain.     Ruperto Cain MD  Division of  Gastroenterology, Hepatology and Nutrition  [facility]  Pager: [phone]      HPI:   Currently, here to follow up colonoscopy.    Continues to feel well. Only issue is constipation that is well managed with miralax.    She went to pelvic floor center and was told overall things looked good. May have been one small abnormality - she cannot remember what it was. Was offered biofeedback. However, she wants to hold off as she is doing well. May consider again in future.    No diarrhea, bleeding, abd pain. No EIM    ROS:    No fevers or chills  No weight loss  No blurry vision, double vision or change in vision  No sore throat  No lymphadenopathy  No headache, paraesthesias, or weakness in a limb  No shortness of breath or wheezing  No chest pain or pressure  No arthralgias or myalgias  No rashes or skin changes  No odynophagia or dysphagia  No BRBPR, hematochezia, melena  No dysuria, frequency or urgency  No hot/cold intolerance or polyria  No anxiety or depression    Extra intestinal manifestations of IBD:  No uveitis/episcleritis  No aphthous ulcers   No arthritis   No erythema nodosum/pyoderma gangrenosum.     PERTINENT PAST MEDICAL HISTORY:  IBD as above  Constipation    PREVIOUS SURGERIES:  Past Surgical History:   Procedure Laterality Date     COLONOSCOPY N/A 10/18/2021    Procedure: COLONOSCOPY, WITH BIOPSY;  Surgeon: Ruperto Cain MD;  Location: [facility] OR       PREVIOUS ENDOSCOPY:  Results for orders placed or performed during the hospital encounter of 10/18/21   COLONOSCOPY   Result Value Ref Range    COLONOSCOPY       [facility]  [address] [place] [id number] [phone]     Endoscopy Department  _______________________________________________________________________________  Patient Name: Ceci Lopez            Procedure Date: 10/18/2021 6:44 AM  MRN: [id number]                       Account Number: [id number]  YOB: 1970             Admit Type:  Outpatient  Age: 50                               Room: Pro 5  Gender: Female                        Note Status: Finalized  Attending MD: Ruperto Cain MD    Total Sedation Time:   _______________________________________________________________________________     Procedure:           Colonoscopy  Indications:         49 YO F with history of left sided ulcerative colitis.                        Doing well on mesalamine (symptomatic remission). Prior                        colonoscopy in 2020 showed aphthae ulcers in TI - ?                        backwash ileitis. Colonoscopy today to restage disease                         as she is establishing care in our clinic.  Providers:           Ruperto Cain MD, Barbara Mesa, Argentina Franz  Referring MD:        Ruperto Cain MD  Medicines:           Monitored Anesthesia Care  Complications:       No immediate complications.  _______________________________________________________________________________  Procedure:           Pre-Anesthesia Assessment:                       - See EPIC H and P note                       After obtaining informed consent, the colonoscope was                        passed under direct vision. Throughout the procedure,                        the patient's blood pressure, pulse, and oxygen                        saturations were monitored continuously. The Colonoscope                        was introduced through the anus and advanced to the                        terminal ileum, with identification of the appendiceal                        orifice and IC valve. The colonoscopy was per formed                        without difficulty. The patient tolerated the procedure                        well. The quality of the bowel preparation was evaluated                        using the BBPS ([place] Bowel Preparation Scale) with                        scores of: Right Colon = 3, Transverse Colon = 3 and                         Left Colon = 3 (entire mucosa seen well with no residual                        staining, small fragments of stool or opaque liquid).                        The total BBPS score equals 9.                                                                                   Findings:       The perianal and digital rectal examinations were normal.       The terminal ileum contained multiple ulcers (largest 10 mm).        Serpigenous. No bleeding was present. No stigmata of recent bleeding        were seen. Biopsies were taken with a cold forceps for histology.       The terminal ileum contained multiple scattered non-bleeding aphthae. No        stigmata of  recent bleeding were seen. Mucosa was biopsied with a cold        forceps for histology.       Inflammation characterized by congestion (edema), erosions, erythema and        friability was found as patches in the ascending colon. Biopsied as part        of the cecum/ascending jar. The rectum, the sigmoid colon, the        descending colon and the transverse colon were spared. This was mild in        severity, and when compared to previous examinations, the findings are        worsened.       Four biopsies were taken every 10 cm with a cold forceps from the entire        colon for dysplasia surveillance. These biopsy specimens from the        cecum/ascending, transverse colon, descending/sigmoid colon and rectum        were sent to Pathology.       The exam was otherwise without abnormality on direct and retroflexion        views.                                                                                   Impression:          - Multiple ulcers in the terminal ileum. Biopsied.                        - Aphtha in the terminal ileum. Biopsied.                       - Inflammatory bowel disease. Inflammation was found in                        the ascending colon. This was mild in severity, worsened                        compared to previous  examinations.                       - The examination was otherwise normal on direct and                        retroflexion views.                       - Biopsies for surveillance were taken from the entire                        colon.                       - Inflammation in the ileum appeared more than backwash                        ileitis. Looked more consistent with Crohn's Disease                        (moderate). Will follow up in clinic to discuss                        treatment options.  Recommendation:      - Discharge patient to home (with escort).                       - Await pathology results.                       - Return to GI clinic as previously scheduled.                       - Will arrange MRE before  follow up                                                                                       ____________________  Ruperto Cain MD  10/18/2021 8:30:35 AM  I was physically present for the entire viewing portion of the exam.  __________________________  Signature of teaching physician  Ruperto Cain MD  Number of Addenda: 0    Note Initiated On: 10/18/2021 6:44 AM  Scope In:  Scope Out:     ]    ALLERGIES:   No Known Allergies    PERTINENT MEDICATIONS:    Current Outpatient Medications:      mesalamine (APRISO ER) 0.375 g 24 hr capsule, Take 4 capsules (1.5 g) by mouth daily, Disp: 360 capsule, Rfl: 3     bisacodyl (DULCOLAX) 5 MG EC tablet, Take 2 tablets by mouth at 10am the day before procedure., Disp: 2 tablet, Rfl: 0     magnesium citrate solution, Drink entire bottle at 4pm two days prior to procedure., Disp: 296 mL, Rfl: 0     polyethylene glycol (GOLYTELY) 236 g suspension, Day before procedure: Start drinking the Golytely solution at 3pm.  Drink one, eight oz. glass every 10-15 minutes until half of the 1st container of Golytely is gone.  At 8pm drink the second half of the 1st container of Golytely.  Drink one, eight oz. glass every 10-15 minutes until gone.  Before you go  "to bed, mix the 2nd container of Golytely.  Day of procedure:  6 hours before your check-in time @ 12:30am, drink one, eight oz. glass every 10-15 minutes until half of the 2nd  container of Golytely is gone. Discard the remainder of solution., Disp: 8000 mL, Rfl: 0    SOCIAL HISTORY:  Social History     Socioeconomic History     Marital status:      Spouse name: Not on file     Number of children: Not on file     Years of education: Not on file     Highest education level: Not on file   Occupational History     Not on file   Tobacco Use     Smoking status: Never Smoker     Smokeless tobacco: Never Used   Substance and Sexual Activity     Alcohol use: Not on file     Drug use: Not on file     Sexual activity: Not on file   Other Topics Concern     Not on file   Social History Narrative     Not on file     Social Determinants of Health     Financial Resource Strain: Not on file   Food Insecurity: Not on file   Transportation Needs: Not on file   Physical Activity: Not on file   Stress: Not on file   Social Connections: Not on file   Intimate Partner Violence: Not on file   Housing Stability: Not on file       FAMILY HISTORY:  No family history on file.    Past/family/social history reviewed and no changes    PHYSICAL EXAMINATION:  Constitutional: aaox3, cooperative, pleasant, not dyspneic/diaphoretic, no acute distress  Vitals reviewed: Ht 1.6 m (5' 3\")   Wt 52.6 kg (116 lb)   BMI 20.55 kg/m    Wt:   Wt Readings from Last 2 Encounters:   11/17/21 52.6 kg (116 lb)   08/19/21 52.6 kg (116 lb)      Eyes: Sclera anicteric/injected  Respiratory: Unlabored breathing  Skin: no jaundice  Psych: Normal affect    PERTINENT STUDIES:  Most recent CBC:  Recent Labs   Lab Test 08/25/21  1111   WBC 8.8   HGB 12.0   HCT 36.9        Most recent hepatic panel:  Recent Labs   Lab Test 08/25/21  1110   ALT 31   AST 22     Most recent creatinine:  Recent Labs   Lab Test 08/25/21  1110   CR 0.84             Answers for " HPI/ROS submitted by the patient on 11/17/2021  General Symptoms: No  Skin Symptoms: No  HENT Symptoms: No  EYE SYMPTOMS: No  HEART SYMPTOMS: No  LUNG SYMPTOMS: No  INTESTINAL SYMPTOMS: Yes  URINARY SYMPTOMS: No  GYNECOLOGIC SYMPTOMS: No  BREAST SYMPTOMS: No  SKELETAL SYMPTOMS: No  BLOOD SYMPTOMS: No  NERVOUS SYSTEM SYMPTOMS: No  MENTAL HEALTH SYMPTOMS: No  Heart burn or indigestion: No  Nausea: No  Vomiting: No  Abdominal pain: No  Bloating: No  Constipation: Yes  Diarrhea: No  Blood in stool: No  Black stools: No  Rectal or Anal pain: No  Fecal incontinence: No  Yellowing of skin or eyes: No  Vomit with blood: No  Change in stools: No

## 2021-11-17 NOTE — PATIENT INSTRUCTIONS
It is good to see you again today!    We will see if your insurance approves the Stelara. If they approve it we will move forward with this.    Continue Apriso for now. We can discuss getting off of this medication in the future after you start the new medication.    Check blood work before starting new medication    Continue miralax    Ok to hold off on starting pelvic floor biofeedback or physicial therapy    Follow up in 3-4 months

## 2021-11-17 NOTE — LETTER
Date:January 4, 2022      Patient was self referred, no letter generated. Do not send.        [facility] Health Information

## 2021-11-17 NOTE — NURSING NOTE
"Chief Complaint   Patient presents with     RECHECK     Follow-up Inflammatory Bowel Disease       Vitals:    11/17/21 1448   Weight: 52.6 kg (116 lb)   Height: 1.6 m (5' 3\")       Body mass index is 20.55 kg/m .                         "

## 2021-11-17 NOTE — PROGRESS NOTES
Ceci is a 50 year old who is being evaluated via a billable video visit.      How would you like to obtain your AVS? MyChart  If the video visit is dropped, the invitation should be resent by: Text to cell phone: [phone]  Will anyone else be joining your video visit? No      Video Start Time: 3:24 PM  Video-Visit Details    Type of service:  Video Visit    Video End Time:3:54 PM    Originating Location (pt. Location): Home    Distant Location (provider location):  [facility] [place]     Platform used for Video Visit: TIME PLUS Q  Answers for HPI/ROS submitted by the patient on 11/17/2021  General Symptoms: No  Skin Symptoms: No  HENT Symptoms: No  EYE SYMPTOMS: No  HEART SYMPTOMS: No  LUNG SYMPTOMS: No  INTESTINAL SYMPTOMS: Yes  URINARY SYMPTOMS: No  GYNECOLOGIC SYMPTOMS: No  BREAST SYMPTOMS: No  SKELETAL SYMPTOMS: No  BLOOD SYMPTOMS: No  NERVOUS SYSTEM SYMPTOMS: No  MENTAL HEALTH SYMPTOMS: No  Heart burn or indigestion: No  Nausea: No  Vomiting: No  Abdominal pain: No  Bloating: No  Constipation: Yes  Diarrhea: No  Blood in stool: No  Black stools: No  Rectal or Anal pain: No  Fecal incontinence: No  Yellowing of skin or eyes: No  Vomit with blood: No  Change in stools: No

## 2021-11-17 NOTE — TELEPHONE ENCOUNTER
Discussed with the patient the process for stelara prior authorization  Would like the infusion at the [facility] infusion   Stelara therapy entered   Message to check on infusion coverage and pharmacy benefits  Pt aware of the process and will have quantiferon gold lab on Friday the 19th.   Patient has follow up appointment   Stelara packet sent to pt via email and has the application for copay

## 2021-11-18 ENCOUNTER — DOCUMENTATION ONLY (OUTPATIENT)
Dept: GASTROENTEROLOGY | Facility: CLINIC | Age: 51
End: 2021-11-18
Payer: COMMERCIAL

## 2021-11-18 NOTE — PROGRESS NOTES
Action 11/18/2021 11:13am -Kandis    Action Taken Fax request sent to [facility] ([phone]) for med recs.    ** Per Dr. Cain

## 2021-11-19 ENCOUNTER — LAB (OUTPATIENT)
Dept: LAB | Facility: CLINIC | Age: 51
End: 2021-11-19
Payer: COMMERCIAL

## 2021-11-19 ENCOUNTER — TELEPHONE (OUTPATIENT)
Dept: GASTROENTEROLOGY | Facility: CLINIC | Age: 51
End: 2021-11-19

## 2021-11-19 DIAGNOSIS — K50.80 CROHN'S DISEASE OF BOTH SMALL AND LARGE INTESTINE WITHOUT COMPLICATION (H): ICD-10-CM

## 2021-11-19 PROCEDURE — 86481 TB AG RESPONSE T-CELL SUSP: CPT

## 2021-11-19 PROCEDURE — 36415 COLL VENOUS BLD VENIPUNCTURE: CPT

## 2021-11-19 NOTE — TELEPHONE ENCOUNTER
PA Initiation    Medication: STELARA   Insurance Company: [facility] - Phone [phone] Fax [phone]  Pharmacy Filling the Rx: [facility] - [place], [place] - [facility] [address]  Filling Pharmacy Phone:    Filling Pharmacy Fax:    Start Date: 11/19/2021

## 2021-11-22 LAB
GAMMA INTERFERON BACKGROUND BLD IA-ACNC: 0.02 IU/ML
M TB IFN-G BLD-IMP: NEGATIVE
M TB IFN-G CD4+ BCKGRND COR BLD-ACNC: 4.55 IU/ML
MITOGEN IGNF BCKGRD COR BLD-ACNC: 0.03 IU/ML
MITOGEN IGNF BCKGRD COR BLD-ACNC: 0.03 IU/ML
QUANTIFERON MITOGEN: 4.57 IU/ML
QUANTIFERON NIL TUBE: 0.02 IU/ML
QUANTIFERON TB1 TUBE: 0.05 IU/ML
QUANTIFERON TB2 TUBE: 0.05

## 2021-11-23 NOTE — TELEPHONE ENCOUNTER
Updated patient that still working on the injection approval. Non formulary  Will update as soon as determination

## 2021-12-03 NOTE — TELEPHONE ENCOUNTER
Called insurance, spoke to Jacquelin ROSENBERG was denied, she is faxing a copy of the denial with the reasoning.

## 2021-12-03 NOTE — TELEPHONE ENCOUNTER
PRIOR AUTHORIZATION DENIED    Medication: STELARA -  DENIED     Denial Date: 12/3/2021    Denial Rational:  PATIENT HAS NOT TRIED AND FAILED 6-MP, IMURAN, BUDESONIDE, SULFASALAZINE, METHOTREXATE     Appeal Information:  CAN APPEAL

## 2021-12-07 ENCOUNTER — TELEPHONE (OUTPATIENT)
Dept: GASTROENTEROLOGY | Facility: CLINIC | Age: 51
End: 2021-12-07
Payer: COMMERCIAL

## 2021-12-07 NOTE — TELEPHONE ENCOUNTER
" Health Call Center    Phone Message    May a detailed message be left on voicemail: yes     Reason for Call: Other:     Kelly from [facility] is responding to an appeal that was sent regarding Ceci, from [employer]. Please call her back ASAP to discuss as she sates they are on a \"limited time frame\"    Action Taken: Message routed to:  [facility] gastro    Travel Screening: Not Applicable                                                                      "

## 2021-12-27 ENCOUNTER — PATIENT OUTREACH (OUTPATIENT)
Dept: GASTROENTEROLOGY | Facility: CLINIC | Age: 51
End: 2021-12-27
Payer: COMMERCIAL

## 2021-12-27 NOTE — PROGRESS NOTES
Contacted patient to let her know that waiting for her insurance company to complete a  Peer to peer with Dr Cain.   Left patient a voice mail message.

## 2022-01-06 ENCOUNTER — PATIENT OUTREACH (OUTPATIENT)
Dept: GASTROENTEROLOGY | Facility: CLINIC | Age: 52
End: 2022-01-06
Payer: COMMERCIAL

## 2022-01-06 NOTE — PROGRESS NOTES
Contacted patient to inform her that stelara is approved.    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 social distancing, vaccines  and inner [place] of contacts    Offered patient a call from Dr Cain and patient declined at this time.

## 2022-01-20 NOTE — PROGRESS NOTES
"Ceci is a 51 year old who is being evaluated via a billable video visit.      How would you like to obtain your AVS? MyChart  If the video visit is dropped, the invitation should be resent by: Send to e-mail at: [email or web address]  Will anyone else be joining your video visit? No       Video Start Time: 10:00 AM    Video-Visit Details    Type of service:  Video Visit    Video End Time:10:42 AM    Originating Location (pt. Location): Home    Distant Location (provider location):  [facility] [place]     Platform used for Video Visit: Kendra      Referring Physician: Ant  Reason for RD Visit: IBD     Nutrition Plan: Continue current diet    Recommendations for MD/Provider to order: None at this time    Malnutrition Diagnosis: Patient does not meet the criteria necessary for diagnosing malnutrition    Nutrition Assessment:  Patient is here for follow-up visit with Registered Dietitian (RD). Patient is a 50 year old female with history of ulcerative colitis (left sided) diagnosed in 2007 recently seen by Dr. Cain 11/17/2021 at which time it was noted that recent colonoscopy (10/2021) showed ileitis and ascending colitis and IBD diagnosis now Crohn's ileo-colitis (inflammatory). At initial RD visit 8/30/2021 ulcerative colitis was noted to be in clinical remission and patient was interested in discussing how diet might influence inflammation and flares as well as need to take Miralax daily even with what she felt was a relatively high fiber intake. Diet recall reflected vegan diet (supplemented with calcium, vitamin D, B12, and iron) with moderate fiber intake. Discussed opportunities to increase dietary fiber intake. Today recalls that things have been going \"great\" since visit last August. Was surprised by change in diagnosis because she was feeling so well. Plans to start on Stelara, which will actually be initiated later today. She also reports that removal of oats from her " diet improved symptoms of constipation and removed need for miralax. Of note, she reports preparing oats with a high ratio of water that produced a creamy consistency, which may have impacted fermentability and role in constipation.    Past Surgical History: No IBD surgical history    Symptom Review  1. Nausea/vomiting? No  2. Heartburn? No  3. Bloating? NA  4. Belching? NA  5. Feeling full quickly? No  6. Decreased appetite? No  7. Weight loss/gain? Yes: Stable. Successful weight loss 5 years ago with plant-based diet. Now weight stable  8. Constipation/Diarrhea? Symptoms of constipation improved with cutting out oats (barley and farro had the same effect); no longer requires miralax  9. Urgency? NA  10. Incomplete Bowel Emptying? NA  11. Abdominal pain/pain with or without eating? No  12. Feeling tired? NA    IBD-Q Score History  IBDQ Score Date IBDQ - Total Score  (Higher score better)   11/17/2021 63       Dietary beliefs and Practices  1.  Did you modify your diet leading up to diagnosis OR Have you modified your diet since diagnosis? Not for UC specifically, but diet changes for overall health and weight loss/maintenance: Avoids alcohol; concentrated sweets; doesn't add oil to foods; vegan  2.  Do you believe that certain foods increase the risk of developing IBD? NA  3.  Do you believe that food/nutrition is an important part of your disease management? NA  4.  Are there specific foods you avoid? NA  5.  Do you believe that specific foods can cause relapse? NA  6.  Are there specific foods or food groups that you feel help? (symptoms/relapse) NA  7.  Do you avoid some foods or food groups for fear of worsening the disease flare? NA  8.  Have you received prior advice on diet?  No   A. If so, source? NA  9.  Have you, or do you follow, a specific diet?  Yes   A. Which one(s)?  Vegan   B. Did/Do you find it beneficial?  NA    I. If able to articulate, what specifically is the benefit? NA  10.  Do you take any  "vitamin, mineral, or herbal supplements? Yes: Calcium and vitamin D; sublingual B12; iron supplement (\"blood builder\" - takes every evening); digestive enzyme every other morning alternates with pascual kombucha  11.  Do you use any calorie/protein supplements?  No  12.  Do you think IBD has influenced your pleasure in eating? NA  13.  Do you feel stressed or anxious about eating? If so why? NA  14.  Do you avoid eating in social settings (e.g. Restaurants)? NA    Diet Recall:  (Typical Day) (Recall 1/24/2022: Similar to previous. No longer eating oats - now eating brown rice in place of oats; and replaced oatmilk with soy milk to increase protein intake)  Meal Name Time Food 8/30/2021   Breakfast  Americano with mix of oat and cashew milk AND steel cut oats (1/4 cup/serving) + 2 tsp flaxseed meal + 2 cups berries (blueberries/blackberries)          Second coffee        Lunch  Sweet potato + shredded cabbage + tahini sauce        Dinner  Soy curls with homemade cauliflower rice AND refried beans AND 1/2 avocado            Dates with walnuts        Snacks  NA   Beverages  Coffee, water, kombucha   Alcohol   None     Frequency of eating/taking out meals: More frequent with covid restrictions lifting (still tries to stick to whole grain and vegetarian)  Food access/availability: Fine  Food preparation confidence/abilities: Fine    Anthropometrics:   Height: Data Unavailable  Weight: 120 pounds  BMI: There is no height or weight on file to calculate BMI.    Weight History:  Wt Readings from Last 10 Encounters:   11/17/21 52.6 kg (116 lb)   08/19/21 52.6 kg (116 lb)     Usual Weight: 116-118 pounds  Weight change in past 6 months: Stable    Labs: Reviewed  Pertinent Medications/vitamin and mineral supplements:  Current Outpatient Medications   Medication     mesalamine (APRISO ER) 0.375 g 24 hr capsule     No current facility-administered medications for this visit.       Food Allergies: None  Food Intolerances: " None  Physical Activity: Regularly  Estimated Nutrition Needs based on most recent body weight of 116 lb: 1300 calories (25 kcals/kg), 50 g protein (1.0 g/kg), ~1 ml/kcal or total fluids per MD    MALNUTRITION:  % Weight Loss:  None noted  % Intake:  No decreased intake noted  Subcutaneous Fat Loss:  None observed  Muscle Loss:  None observed  Fluid Retention:  None noted    Nutrition Diagnosis:    Food and nutrition related knowledge deficit related to IBD as evidenced by need for diet education.    Nutrition Prescription: Regular diet    Nutrition Intervention:    Nutrition Education/Counseling:  Discussed diet and IBD history. Reviewed that diet guidance for ulcerative colitis applies to her Crohn's disease diagnosis as well. Discussed that there is limited data on role of diet in Crohn's disease flares. Discussed results of recent DINE-CD study and other historical studies assessing role of diet in management of CD. Discussed maintaining current diet and introduced IBD Anti-inflammatory diet food list as a resource in case she experiences a flare or increased symptoms. Discussed that certain aspects of the diet (e.g. gluten free, dairy free) may be overly restrictive, but that the food list seems to provide a reasonable texture-based diet advancement that can help guide food reintroduction. Discussed maintaing intake of prebiotic/soluble fiber containing foods (e.g. ground flaxseed, debra seeds) to support health of gut microbiome.     Educational Materials Provided: IBD-AID food list  Patient verbalized understanding of education provided. See all recommendations under Goals.    Goals:  1. Continue current diet    2. The IBD-AID food list (Phase I and II) can provide a good diet resource if you experience increased symptoms or a flare    Nutrition Monitoring and Evaluation: Will monitor adherence to nutrition recommendations at future RD visits.     Further Medical Nutrition Therapy: Yes  Next Appointment (if  applicable): 1 year  Patient was encouraged to call/contact RD with any further questions.

## 2022-01-24 ENCOUNTER — INFUSION THERAPY VISIT (OUTPATIENT)
Dept: INFUSION THERAPY | Facility: CLINIC | Age: 52
End: 2022-01-24
Attending: INTERNAL MEDICINE
Payer: COMMERCIAL

## 2022-01-24 ENCOUNTER — VIRTUAL VISIT (OUTPATIENT)
Dept: GASTROENTEROLOGY | Facility: CLINIC | Age: 52
End: 2022-01-24
Payer: COMMERCIAL

## 2022-01-24 VITALS
RESPIRATION RATE: 16 BRPM | TEMPERATURE: 98.9 F | SYSTOLIC BLOOD PRESSURE: 105 MMHG | WEIGHT: 125.6 LBS | HEART RATE: 64 BPM | BODY MASS INDEX: 22.25 KG/M2 | DIASTOLIC BLOOD PRESSURE: 69 MMHG | OXYGEN SATURATION: 100 %

## 2022-01-24 DIAGNOSIS — Z71.3 NUTRITIONAL COUNSELING: ICD-10-CM

## 2022-01-24 DIAGNOSIS — K50.80 CROHN'S DISEASE OF BOTH SMALL AND LARGE INTESTINE WITHOUT COMPLICATION (H): Primary | ICD-10-CM

## 2022-01-24 DIAGNOSIS — K50.90 CROHN'S DISEASE (H): Primary | ICD-10-CM

## 2022-01-24 DIAGNOSIS — K59.00 CONSTIPATION, UNSPECIFIED CONSTIPATION TYPE: ICD-10-CM

## 2022-01-24 PROCEDURE — 96365 THER/PROPH/DIAG IV INF INIT: CPT

## 2022-01-24 PROCEDURE — 258N000003 HC RX IP 258 OP 636: Performed by: INTERNAL MEDICINE

## 2022-01-24 PROCEDURE — 250N000011 HC RX IP 250 OP 636: Performed by: INTERNAL MEDICINE

## 2022-01-24 PROCEDURE — 97803 MED NUTRITION INDIV SUBSEQ: CPT | Mod: 95 | Performed by: DIETITIAN, REGISTERED

## 2022-01-24 RX ORDER — ALBUTEROL SULFATE 0.83 MG/ML
2.5 SOLUTION RESPIRATORY (INHALATION)
Status: CANCELLED | OUTPATIENT
Start: 2022-01-24

## 2022-01-24 RX ORDER — EPINEPHRINE 1 MG/ML
0.3 INJECTION, SOLUTION INTRAMUSCULAR; SUBCUTANEOUS EVERY 5 MIN PRN
Status: CANCELLED | OUTPATIENT
Start: 2022-01-24

## 2022-01-24 RX ORDER — DIPHENHYDRAMINE HYDROCHLORIDE 50 MG/ML
50 INJECTION INTRAMUSCULAR; INTRAVENOUS
Status: CANCELLED
Start: 2022-01-24

## 2022-01-24 RX ORDER — NALOXONE HYDROCHLORIDE 0.4 MG/ML
0.2 INJECTION, SOLUTION INTRAMUSCULAR; INTRAVENOUS; SUBCUTANEOUS
Status: CANCELLED | OUTPATIENT
Start: 2022-01-24

## 2022-01-24 RX ORDER — ALBUTEROL SULFATE 90 UG/1
1-2 AEROSOL, METERED RESPIRATORY (INHALATION)
Status: CANCELLED
Start: 2022-01-24

## 2022-01-24 RX ORDER — MEPERIDINE HYDROCHLORIDE 25 MG/ML
25 INJECTION INTRAMUSCULAR; INTRAVENOUS; SUBCUTANEOUS EVERY 30 MIN PRN
Status: CANCELLED | OUTPATIENT
Start: 2022-01-24

## 2022-01-24 RX ORDER — METHYLPREDNISOLONE SODIUM SUCCINATE 125 MG/2ML
125 INJECTION, POWDER, LYOPHILIZED, FOR SOLUTION INTRAMUSCULAR; INTRAVENOUS
Status: CANCELLED
Start: 2022-01-24

## 2022-01-24 RX ADMIN — USTEKINUMAB 260 MG: 130 SOLUTION INTRAVENOUS at 16:00

## 2022-01-24 ASSESSMENT — PAIN SCALES - GENERAL: PAINLEVEL: NO PAIN (0)

## 2022-01-24 NOTE — LETTER
Date:January 26, 2022      Provider requested that no letter be sent. Do not send.       [facility]

## 2022-01-24 NOTE — PATIENT INSTRUCTIONS
Dear Ceci Lopez    Thank you for choosing [facility] ([facility]) for your infusion.  The following information is a summary of our appointment as well as important reminders.      EDUCATION POST BIOLOGICAL/CHEMOTHERAPY INFUSION  Call the triage nurse at your clinic or seek medical attention if you have chills and/or temperature greater than or equal to 100.5, uncontrolled nausea/vomiting, diarrhea, constipation, dizziness, shortness of breath, chest pain, heart palpitations, weakness or any other new or concerning symptoms, questions or concerns.  You can not have any live virus vaccines prior to or during treatment or up to 6 months post infusion.  If you have an upcoming surgery, medical procedure or dental procedure during treatment, this should be discussed with your ordering physician and your surgeon/dentist.  If you are having any concerning symptom, if you are unsure if you should get your next infusion or wish to speak to a provider before your next infusion, please call your care coordinator or triage nurse at your clinic to notify them so we can adequately serve you.      We look forward in seeing you on your next appointment here at [facility] ([facility]).  Please don t hesitate to call us at [phone] to reschedule any of your appointments or to speak with one of the [facility] registered nurses.  It was a pleasure taking care of you today.    Sincerely,    [facility]  [address] [place] [address]  [id number]  Phone:  [phone]  Patient Education     Ustekinumab Solution for injection  What is this medicine?  USTEKINUMAB (US te KIN ue mab) is used to treat plaque psoriasis and psoriatic arthritis. It is not a cure.  This medicine may be used for other purposes; ask your health care provider or pharmacist if you have questions.  What should I tell  my health care provider before I take this medicine?  They need to know if you have any of these conditions:    cancer    diabetes    immune system problems    infection (especially a virus infection such as chickenpox, cold sores, or herpes)or history of infections    receiving or have received allergy shots    recently received or scheduled to receive a vaccine    tuberculosis, a positive skin test for tuberculosis, or have recently been in close contact with someone who has tuberculosis    an unusual reaction to ustekinumab, other medicines, foods, dyes, or preservatives    pregnant or trying to get pregnant    breast-feeding  How should I use this medicine?  This medicine is for injection under the skin. You will be taught how to prepare and give this medicine. Use exactly as directed. Take your medicine at regular intervals. Do not take your medicine more often than directed.  It is important that you put your used needles and syringes in a special sharps container. Do not put them in a trash can. If you do not have a sharps container, call your pharmacist or healthcare provider to get one.  A special MedGuide will be given to you by the pharmacist with each prescription and refill. Be sure to read this information carefully each time.  Talk to your pediatrician regarding the use of this medicine in children. Special care may be needed.  Overdosage: If you think you've taken too much of this medicine contact a poison control center or emergency room at once.  NOTE: This medicine is only for you. Do not share this medicine with others.  What if I miss a dose?  If you miss a dose, take it as soon as you can. If it is almost time for your next dose, take only that dose. Do not take double or extra doses.  What may interact with this medicine?  Do not take this medicine with any of the following medications:    live virus vaccines  This medicine may also interact with the following  medications:    cyclosporine    immunosuppressives    vaccines    warfarin  This list may not describe all possible interactions. Give your health care provider a list of all the medicines, herbs, non-prescription drugs, or dietary supplements you use. Also tell them if you smoke, drink alcohol, or use illegal drugs. Some items may interact with your medicine.  What should I watch for while using this medicine?  Your condition will be monitored carefully while you are receiving this medicine. Tell your doctor or healthcare professional if your symptoms do not start to get better or if they get worse.  You will be tested for tuberculosis (TB) before you start this medicine. If your doctor prescribes any medicine for TB, you should start taking the TB medicine before starting this medicine. Make sure to finish the full course of TB medicine.  Call your doctor or health care professional if you get a cold or other infection while receiving this medicine. Do not treat yourself. This medicine may decrease your body's ability to fight infection.  Talk to your doctor about your risk of cancer. You may be more at risk for certain types of cancers if you take this medicine.  What side effects may I notice from receiving this medicine?  Side effects that you should report to your doctor or health care professional as soon as possible:    allergic reactions like skin rash, itching or hives, swelling of the face, lips, or tongue    breathing problems    changes in vision    confusion    fever, chills, or any other sign of infection    seizures    swollen lymph nodes in the neck, underarm, or groin areas    unexplained weight loss    unusually weak or tired  Side effects that usually do not require medical attention (Report these to your doctor or health care professional if they continue or are bothersome.):    headache    redness, itching, swelling, or bruising at site where injected  This list may not describe all possible  side effects. Call your doctor for medical advice about side effects. You may report side effects to FDA at 8-342-JCP-2619.  Where should I keep my medicine?  Keep out of the reach of children.  If you are using this medicine at home, you will be instructed on how to store this medicine. Throw away any unused medicine after the expiration date on the label.  NOTE: This sheet is a summary. It may not cover all possible information. If you have questions about this medicine, talk to your doctor, pharmacist, or health care provider.  NOTE:This sheet is a summary. It may not cover all possible information. If you have questions about this medicine, talk to your doctor, pharmacist, or health care provider. Copyright  2016 [employer]

## 2022-01-24 NOTE — LETTER
1/24/2022         RE: Ceci Lopez  [address]  [place] [address]        Dear Colleague,    Thank you for referring your patient, Ceci Lopez, to the [facility]. Please see a copy of my visit note below.    Ceci is a 51 year old who is being evaluated via a billable video visit.      How would you like to obtain your AVS? MyChart  If the video visit is dropped, the invitation should be resent by: Send to e-mail at: [email or web address]  Will anyone else be joining your video visit? No       Video Start Time: 10:00 AM    Video-Visit Details    Type of service:  Video Visit    Video End Time:10:42 AM    Originating Location (pt. Location): Home    Distant Location (provider location):  [facility]     Platform used for Video Visit: Kendra      Referring Physician: Ant  Reason for RD Visit: IBD     Nutrition Plan: Continue current diet    Recommendations for MD/Provider to order: None at this time    Malnutrition Diagnosis: Patient does not meet the criteria necessary for diagnosing malnutrition    Nutrition Assessment:  Patient is here for follow-up visit with Registered Dietitian (RD). Patient is a 50 year old female with history of ulcerative colitis (left sided) diagnosed in 2007 recently seen by Dr. Cain 11/17/2021 at which time it was noted that recent colonoscopy (10/2021) showed ileitis and ascending colitis and IBD diagnosis now Crohn's ileo-colitis (inflammatory). At initial RD visit 8/30/2021 ulcerative colitis was noted to be in clinical remission and patient was interested in discussing how diet might influence inflammation and flares as well as need to take Miralax daily even with what she felt was a relatively high fiber intake. Diet recall reflected vegan diet (supplemented with calcium, vitamin D, B12, and iron) with moderate fiber intake. Discussed opportunities to increase dietary fiber intake.  "Today recalls that things have been going \"great\" since visit last August. Was surprised by change in diagnosis because she was feeling so well. Plans to start on Stelara, which will actually be initiated later today. She also reports that removal of oats from her diet improved symptoms of constipation and removed need for miralax. Of note, she reports preparing oats with a high ratio of water that produced a creamy consistency, which may have impacted fermentability and role in constipation.    Past Surgical History: No IBD surgical history    Symptom Review  1. Nausea/vomiting? No  2. Heartburn? No  3. Bloating? NA  4. Belching? NA  5. Feeling full quickly? No  6. Decreased appetite? No  7. Weight loss/gain? Yes: Stable. Successful weight loss 5 years ago with plant-based diet. Now weight stable  8. Constipation/Diarrhea? Symptoms of constipation improved with cutting out oats (barley and farro had the same effect); no longer requires miralax  9. Urgency? NA  10. Incomplete Bowel Emptying? NA  11. Abdominal pain/pain with or without eating? No  12. Feeling tired? NA    IBD-Q Score History  IBDQ Score Date IBDQ - Total Score  (Higher score better)   11/17/2021 63       Dietary beliefs and Practices  1.  Did you modify your diet leading up to diagnosis OR Have you modified your diet since diagnosis? Not for UC specifically, but diet changes for overall health and weight loss/maintenance: Avoids alcohol; concentrated sweets; doesn't add oil to foods; vegan  2.  Do you believe that certain foods increase the risk of developing IBD? NA  3.  Do you believe that food/nutrition is an important part of your disease management? NA  4.  Are there specific foods you avoid? NA  5.  Do you believe that specific foods can cause relapse? NA  6.  Are there specific foods or food groups that you feel help? (symptoms/relapse) NA  7.  Do you avoid some foods or food groups for fear of worsening the disease flare? NA  8.  Have you " "received prior advice on diet?  No   A. If so, source? NA  9.  Have you, or do you follow, a specific diet?  Yes   A. Which one(s)?  Vegan   B. Did/Do you find it beneficial?  NA    I. If able to articulate, what specifically is the benefit? NA  10.  Do you take any vitamin, mineral, or herbal supplements? Yes: Calcium and vitamin D; sublingual B12; iron supplement (\"blood builder\" - takes every evening); digestive enzyme every other morning alternates with pascual kombucha  11.  Do you use any calorie/protein supplements?  No  12.  Do you think IBD has influenced your pleasure in eating? NA  13.  Do you feel stressed or anxious about eating? If so why? NA  14.  Do you avoid eating in social settings (e.g. Restaurants)? NA    Diet Recall:  (Typical Day) (Recall 1/24/2022: Similar to previous. No longer eating oats - now eating brown rice in place of oats; and replaced oatmilk with soy milk to increase protein intake)  Meal Name Time Food 8/30/2021   Breakfast  Americano with mix of oat and cashew milk AND steel cut oats (1/4 cup/serving) + 2 tsp flaxseed meal + 2 cups berries (blueberries/blackberries)          Second coffee        Lunch  Sweet potato + shredded cabbage + tahini sauce        Dinner  Soy curls with homemade cauliflower rice AND refried beans AND 1/2 avocado            Dates with walnuts        Snacks  NA   Beverages  Coffee, water, kombucha   Alcohol   None     Frequency of eating/taking out meals: More frequent with covid restrictions lifting (still tries to stick to whole grain and vegetarian)  Food access/availability: Fine  Food preparation confidence/abilities: Fine    Anthropometrics:   Height: Data Unavailable  Weight: 120 pounds  BMI: There is no height or weight on file to calculate BMI.    Weight History:  Wt Readings from Last 10 Encounters:   11/17/21 52.6 kg (116 lb)   08/19/21 52.6 kg (116 lb)     Usual Weight: 116-118 pounds  Weight change in past 6 months: Stable    Labs: " Reviewed  Pertinent Medications/vitamin and mineral supplements:  Current Outpatient Medications   Medication     mesalamine (APRISO ER) 0.375 g 24 hr capsule     No current facility-administered medications for this visit.       Food Allergies: None  Food Intolerances: None  Physical Activity: Regularly  Estimated Nutrition Needs based on most recent body weight of 116 lb: 1300 calories (25 kcals/kg), 50 g protein (1.0 g/kg), ~1 ml/kcal or total fluids per MD    MALNUTRITION:  % Weight Loss:  None noted  % Intake:  No decreased intake noted  Subcutaneous Fat Loss:  None observed  Muscle Loss:  None observed  Fluid Retention:  None noted    Nutrition Diagnosis:    Food and nutrition related knowledge deficit related to IBD as evidenced by need for diet education.    Nutrition Prescription: Regular diet    Nutrition Intervention:    Nutrition Education/Counseling:  Discussed diet and IBD history. Reviewed that diet guidance for ulcerative colitis applies to her Crohn's disease diagnosis as well. Discussed that there is limited data on role of diet in Crohn's disease flares. Discussed results of recent DINE-CD study and other historical studies assessing role of diet in management of CD. Discussed maintaining current diet and introduced IBD Anti-inflammatory diet food list as a resource in case she experiences a flare or increased symptoms. Discussed that certain aspects of the diet (e.g. gluten free, dairy free) may be overly restrictive, but that the food list seems to provide a reasonable texture-based diet advancement that can help guide food reintroduction. Discussed maintaing intake of prebiotic/soluble fiber containing foods (e.g. ground flaxseed, debra seeds) to support health of gut microbiome.     Educational Materials Provided: IBD-AID food list  Patient verbalized understanding of education provided. See all recommendations under Goals.    Goals:  1. Continue current diet    2. The IBD-AID food list (Phase  I and II) can provide a good diet resource if you experience increased symptoms or a flare    Nutrition Monitoring and Evaluation: Will monitor adherence to nutrition recommendations at future RD visits.     Further Medical Nutrition Therapy: Yes  Next Appointment (if applicable): 1 year  Patient was encouraged to call/contact RD with any further questions.                 Again, thank you for allowing me to participate in the care of your patient.        Sincerely,        Brett Gonzalez RD

## 2022-01-24 NOTE — LETTER
1/24/2022         RE: Ceci Lopez  [address] [facility] [address]        Dear Colleague,    Thank you for referring your patient, Ceci Lopez, to the [facility]. Please see a copy of my visit note below.    Infusion Nursing Note:  Ceci Lopez presents today for Stelera.    Patient seen by provider today: No   present during visit today: Not Applicable.    Note: Stelera given over 60 min.      Intravenous Access:  Peripheral IV placed.    Treatment Conditions:  Biological Infusion Checklist:  ~~~ NOTE: If the patient answers yes to any of the questions below, hold the infusion and contact ordering provider or on-call provider.    1. Have you recently had an elevated temperature, fever, chills, productive cough, coughing for 3 weeks or longer or hemoptysis, abnormal vital signs, night sweats,  chest pain or have you noticed a decrease in your appetite, unexplained weight loss or fatigue? No  2. Do you have any open wounds or new incisions? No  3. Do you have any recent or upcoming hospitalizations, surgeries or dental procedures? No  4. Do you currently have or recently have had any signs of illness or infection or are you on any antibiotics? No  5. Have you had any new, sudden or worsening abdominal pain? No  6. Have you or anyone in your household received a live vaccination in the past 4 weeks? Please note:  No live vaccines while on biologic/chemotherapy until 6 months after the last treatment.  Patient can receive the flu vaccine (shot only) and the pneumovax.  It is optimal for the patient to get these vaccines mid cycle, but they can be given at any time as long as it is not on the day of the infusion. No  7. Have you recently been diagnosed with any new nervous system diseases (ie. Multiple sclerosis, Guillain [place], seizures, neurological changes) or cancer diagnosis? No  8. Are you on any form of radiation or chemotherapy? No  9. Are  you pregnant or breast feeding or do you have plans of pregnancy in the future? No  10. Have you been having any signs of worsening depression or suicidal ideations?  (benlysta only) NA  11. Have there been any other new onset medical symptoms? No        Post Infusion Assessment:  Patient tolerated infusion without incident.  Site patent and intact, free from redness, edema or discomfort.  No evidence of extravasations.  Access discontinued per protocol.     POST-INFUSION OF BIOLOGICAL MEDICATION:  Reviewed with patient.  Given biologic medication or medication hand-out. Inform patient if any fever, chills or signs of infection, new symptoms, abdominal pain, heart palpitations, shortness of breath, reaction, weakness, neurological changes, seek medical attention immediately and should not receive infusions. No live virus vaccines prior to or during treatment or up to 6 months post infusion. If the patient has an upcoming procedure or surgery, this should be discussed with the rheumatologist and surgeon or provider.        Discharge Plan:   Discharge instructions reviewed with: Patient.  Patient and/or family verbalized understanding of discharge instructions and all questions answered.  Patient discharged in stable condition accompanied by: self.  Departure Mode: Ambulatory.    Administrations This Visit     ustekinumab (STELARA) 260 mg in sodium chloride 0.9 % 250 mL infusion     Admin Date  01/24/2022 Action  New Bag Dose  260 mg Rate  250 mL/hr Route  Intravenous Administered By  Allyson Bobby, RN                    Allyson Bobby RN                          Again, thank you for allowing me to participate in the care of your patient.        Sincerely,        [facility]

## 2022-01-24 NOTE — LETTER
Date:February 9, 2022      Patient was self referred, no letter generated. Do not send.        [facility] Information

## 2022-01-24 NOTE — PATIENT INSTRUCTIONS
Karl Ornelas,    It was great seeing you again today. Below is a summary of what we discussed:    1. Continue current diet    2. The IBD-AID food list (Phase I and II) can provide a good diet resource if you experience increased symptoms or a flare    Best regards,  Brett Gonzalez, PhD, RD

## 2022-01-24 NOTE — PROGRESS NOTES
Infusion Nursing Note:  Ceci Lopez presents today for Stelera.    Patient seen by provider today: No   present during visit today: Not Applicable.    Note: Stelera given over 60 min.      Intravenous Access:  Peripheral IV placed.    Treatment Conditions:  Biological Infusion Checklist:  ~~~ NOTE: If the patient answers yes to any of the questions below, hold the infusion and contact ordering provider or on-call provider.    1. Have you recently had an elevated temperature, fever, chills, productive cough, coughing for 3 weeks or longer or hemoptysis, abnormal vital signs, night sweats,  chest pain or have you noticed a decrease in your appetite, unexplained weight loss or fatigue? No  2. Do you have any open wounds or new incisions? No  3. Do you have any recent or upcoming hospitalizations, surgeries or dental procedures? No  4. Do you currently have or recently have had any signs of illness or infection or are you on any antibiotics? No  5. Have you had any new, sudden or worsening abdominal pain? No  6. Have you or anyone in your household received a live vaccination in the past 4 weeks? Please note:  No live vaccines while on biologic/chemotherapy until 6 months after the last treatment.  Patient can receive the flu vaccine (shot only) and the pneumovax.  It is optimal for the patient to get these vaccines mid cycle, but they can be given at any time as long as it is not on the day of the infusion. No  7. Have you recently been diagnosed with any new nervous system diseases (ie. Multiple sclerosis, Guillain [place], seizures, neurological changes) or cancer diagnosis? No  8. Are you on any form of radiation or chemotherapy? No  9. Are you pregnant or breast feeding or do you have plans of pregnancy in the future? No  10. Have you been having any signs of worsening depression or suicidal ideations?  (benlysta only) NA  11. Have there been any other new onset medical symptoms? No        Post Infusion  Assessment:  Patient tolerated infusion without incident.  Site patent and intact, free from redness, edema or discomfort.  No evidence of extravasations.  Access discontinued per protocol.     POST-INFUSION OF BIOLOGICAL MEDICATION:  Reviewed with patient.  Given biologic medication or medication hand-out. Inform patient if any fever, chills or signs of infection, new symptoms, abdominal pain, heart palpitations, shortness of breath, reaction, weakness, neurological changes, seek medical attention immediately and should not receive infusions. No live virus vaccines prior to or during treatment or up to 6 months post infusion. If the patient has an upcoming procedure or surgery, this should be discussed with the rheumatologist and surgeon or provider.        Discharge Plan:   Discharge instructions reviewed with: Patient.  Patient and/or family verbalized understanding of discharge instructions and all questions answered.  Patient discharged in stable condition accompanied by: self.  Departure Mode: Ambulatory.    Administrations This Visit     ustekinumab (STELARA) 260 mg in sodium chloride 0.9 % 250 mL infusion     Admin Date  01/24/2022 Action  New Bag Dose  260 mg Rate  250 mL/hr Route  Intravenous Administered By  Allyson Bobby, RN                    Allyson Bobby RN

## 2022-01-30 ENCOUNTER — HEALTH MAINTENANCE LETTER (OUTPATIENT)
Age: 52
End: 2022-01-30

## 2022-02-04 ENCOUNTER — IMMUNIZATION (OUTPATIENT)
Dept: NURSING | Facility: CLINIC | Age: 52
End: 2022-02-04
Payer: COMMERCIAL

## 2022-02-04 PROCEDURE — 91305 COVID-19,PF,PFIZER (12+ YRS): CPT

## 2022-02-04 PROCEDURE — 0054A COVID-19,PF,PFIZER (12+ YRS): CPT

## 2022-02-07 ENCOUNTER — PATIENT OUTREACH (OUTPATIENT)
Dept: GASTROENTEROLOGY | Facility: CLINIC | Age: 52
End: 2022-02-07

## 2022-02-07 DIAGNOSIS — K50.80 CROHN'S DISEASE OF BOTH SMALL AND LARGE INTESTINE WITHOUT COMPLICATION (H): Primary | ICD-10-CM

## 2022-02-07 RX ORDER — USTEKINUMAB 90 MG/ML
INJECTION, SOLUTION SUBCUTANEOUS
Qty: 1 ML | Refills: 4 | Status: SHIPPED | OUTPATIENT
Start: 2022-02-07 | End: 2022-12-01

## 2022-02-07 RX ORDER — USTEKINUMAB 90 MG/ML
INJECTION, SOLUTION SUBCUTANEOUS
Qty: 1 ML | Refills: 4 | OUTPATIENT
Start: 2022-02-07 | End: 2022-02-07

## 2022-02-07 NOTE — PROGRESS NOTES
Patient calls today to discuss the process for obtaining stelara injections  Infusion was on January 24   Due for first injection on March 24  Prescription sent and in basket message to pharmacy liaison   .

## 2022-02-08 NOTE — TELEPHONE ENCOUNTER
There is some confusion around the status of the Stelara injections, as there are notes indicating a [id number] was done and this was approved. The documentation is not readily available, therefore I contacted [facility] prior authorization department. The representative stated there is no documentation of a [id number] for this case. The authorization is still in denied status from 12/3. Confirmed fax number as included below for appealing. Spoke with our RNCC who will discuss with Dr. Cain.    Ceci is due for her first self-injection 3/24.

## 2022-02-15 NOTE — TELEPHONE ENCOUNTER
Medication Appeal Initiation    We have initiated an appeal for the requested medication:  Medication: STELARA -  Appeal pending  Appeal Start Date:  2/15/2022  Insurance Company: [employer] [place] - Phone [phone] Fax [phone]  Comments:  Appeal letter and chart notes faxed to plan at [phone]

## 2022-02-15 NOTE — TELEPHONE ENCOUNTER
I was contacted by a representative from [employer] regarding Ceci's authorization. They wanted to confirm a dose - which was confirmed at one 90 mg syringe every 8 weeks. They had no other questions at this time.

## 2022-02-21 ENCOUNTER — PATIENT OUTREACH (OUTPATIENT)
Dept: GASTROENTEROLOGY | Facility: CLINIC | Age: 52
End: 2022-02-21

## 2022-02-21 NOTE — PROGRESS NOTES
Patient had left a my chart message and also a voice mail  Patient states she is doing well and would like to move out her clinic appt to April   Moved to April which will be after her stelara injection

## 2022-02-22 NOTE — TELEPHONE ENCOUNTER
Contacted [employer] for an update on the status of the appeal. Notes the appeals are handled by the plan. Representative notes that the appeal was picked up by the plan on 2/15 [phone]. Transferred then hungup on.    Tried contacting the number directly. Spent time in phone tree - then hung up on again.     Tried this process again. Hung up on x3.     Left message for a representative to return my call, as wait time exceeds 35 mins.

## 2022-02-22 NOTE — TELEPHONE ENCOUNTER
[facility] returned my call. The representative is initially uncertain of my ask and is inquiring about dates of service. I explain the situation and she put me on a brief hold. She is now asking me about the billed quantity - and I reviewed that this would be for Stelara 90 mg every eight weeks.    The call is put on hold several times. After 20 minutes I ask if there is a problem or why the representative continues to place me on hold. She is able to find the appeal, but does not have a status to give me so she is looking into this.    She comes back to say that she cannot find the appeal and encourages me to submit the documents directly to the plan versus [employer]. We discussed that the documentation was submitted directly to the plan. Given the amount of time it took for this response, I asked if I could speak with another representative.    The representative places me on hold several times and then asks me to have [employer] submit the appeal to them. I asked which is correct given the conflicting information. Placed on hold. She declined my request to speak with a supervisor.    I ask if she has a fax number that we can re-submit the appeal to. I notified her of previous fax submission based on the denial letter. Placed on hold again. The representative states she is unable to give me a fax number to send in the appeal. She asks that I contact [employer]. I again ask that a supervisor contact me. She states there is no gaurantee, but she will see about this.    Call Time: 51 minutes

## 2022-03-17 ENCOUNTER — PATIENT OUTREACH (OUTPATIENT)
Dept: GASTROENTEROLOGY | Facility: CLINIC | Age: 52
End: 2022-03-17

## 2022-03-17 NOTE — TELEPHONE ENCOUNTER
Patient calls to request update on stelara  Patient is due on 21st  Patient also is moving  Contacted [facility] pharmacy

## 2022-03-18 NOTE — TELEPHONE ENCOUNTER
Spoke with Allen at [facility] regarding status of claim.  At one point she saw no PA, peer to peer or appeal.  Then she found a peer to peer with an approval but was not able to get a paid claim. Then she gave me a reference number for appeal that was submitted [id number] which she later told me was only a ticket number.  At one point she told me to call [facility] "[employer]" and I declined and demanded to talk with a supervisor or clinical reviewer.  After 93 minutes on the phone it was determined that it was best to submit an urgent appeal to [facility] (the plan) at [phone].  Allen gave me this fax number at two separate times so I believe it is the correct number.  Chart notes with Appeal letter with urgent request that patient is due to start therapy on 03/24 and that infusions have been approved and completed sent to plan.

## 2022-03-24 ENCOUNTER — PATIENT OUTREACH (OUTPATIENT)
Dept: GASTROENTEROLOGY | Facility: CLINIC | Age: 52
End: 2022-03-24

## 2022-03-24 NOTE — TELEPHONE ENCOUNTER
Patient to receive stelara late tomorrow   Scheduled for clinic appt on Saturday at 9 am for injection training

## 2022-03-24 NOTE — TELEPHONE ENCOUNTER
MEDICATION APPEAL APPROVED    Medication: STELARA -  PA Approved  Authorization Effective Date: 11/26/2021  Authorization Expiration Date: 3/22/2023  Approved Dose/Quantity: *  Reference #:     Insurance Company: [employer] [place] - Phone [phone] Fax [phone]  Expected CoPay:       CoPay Card Available:      Foundation Assistance Needed:    Which Pharmacy is filling the prescription (Not needed for infusion/clinic administered): [facility] - [place], [place] - [address]

## 2022-03-26 ENCOUNTER — ALLIED HEALTH/NURSE VISIT (OUTPATIENT)
Dept: GASTROENTEROLOGY | Facility: CLINIC | Age: 52
End: 2022-03-26
Payer: COMMERCIAL

## 2022-03-26 VITALS — HEART RATE: 99 BPM

## 2022-03-26 DIAGNOSIS — K50.80 CROHN'S DISEASE OF BOTH SMALL AND LARGE INTESTINE WITHOUT COMPLICATION (H): Primary | ICD-10-CM

## 2022-03-26 PROCEDURE — 99207 PR NO BILLABLE SERVICE THIS VISIT: CPT

## 2022-03-26 NOTE — NURSING NOTE
Patient here for stelara injection training with her   Assisted patient with injection right side of upper abdomen           Prior to injection, verified patient identity using patient's name and date of birth.  Due to injection administration, patient instructed to remain in clinic for 15 minutes  afterwards, and to report any adverse reaction to me immediately.  stelara        Drug Amount Wasted:  None.  Vial/Syringe: Single dose vial       Right  upper quadrant  abdomen     Patient has follow up with Dr Cain   Encouarged patient to continue to wear a mask  Pt has had covid vaccine series and two additional doses.   Patient will discuss with Dr Cain if recommended to have covid antibodies drawn      Patient observed for 30 minutes

## 2022-04-27 ENCOUNTER — VIRTUAL VISIT (OUTPATIENT)
Dept: GASTROENTEROLOGY | Facility: CLINIC | Age: 52
End: 2022-04-27
Payer: COMMERCIAL

## 2022-04-27 DIAGNOSIS — K50.80 CROHN'S DISEASE OF BOTH SMALL AND LARGE INTESTINE WITHOUT COMPLICATION (H): Primary | ICD-10-CM

## 2022-04-27 DIAGNOSIS — K59.00 CONSTIPATION, UNSPECIFIED CONSTIPATION TYPE: ICD-10-CM

## 2022-04-27 PROCEDURE — 99215 OFFICE O/P EST HI 40 MIN: CPT | Mod: 95 | Performed by: INTERNAL MEDICINE

## 2022-04-27 NOTE — LETTER
4/27/2022         RE: Ceci Lopez  [address] [place] [id number]        Dear Colleague,    Thank you for referring your patient, Ceci Lopez, to the [facility] [place]. Please see a copy of my visit note below.    Ceci is a 51 year old who is being evaluated via a billable video visit.      How would you like to obtain your AVS? MyChart  If the video visit is dropped, the invitation should be resent by: Send to e-mail at: [email or web address]  Will anyone else be joining your video visit? No       Video start: 3:12  Video end: 3:22      IBD CLINIC VISIT    CC/REFERRING MD:  No ref. provider found  REASON FOR CONSULTATION: follow up CD    ASSESSMENT/PLAN:    1. Crohn's ileocolitis - symptomatic remission on ustekinumab and Apriso. Ok to taper apriso. She has always felt well so will be important to check for mucosal healing.    -continue ustekinumab  -taper apriso (see pt instructions)  -check labs q 3 months  -check colonoscopy in 6 months    2. Constipation - evidence of obstructive defacography by pelvic floor eval. However, pt stopped eating oats and constipation has resolved. Hold off on biofeedback.    3. HCM - referral to [facility]    Also refer patient to primary care clinic to establish care.      IBD HISTORY  Age at diagnosis: 2005  Extent of disease: initially left sided - however most recent colonoscopy shows ileitis and ascending colitis. Biopsies with inflammation throughout colon except descending/sigmoid  Disease phenotype: inflammatory  Ruthy-anal disease: none  Current CD medications:   -Apriso  -Ustekinumab  Prior IBD surgeries: none  Prior IBD Medications: other mesalamine    DRUG MONITORING  TPMT enzyme activity: 28 WNL    6-TGN/6-MMPN levels: --    Biologic concentration: --    DISEASE ASSESSMENT  Labs  Recent Labs   Lab Test 08/25/21  1110   CRP <2.9   Fecal calprotectin: None  Endoscopy:   August 21, 2020 EGD and Colonoscopy  EGD was normal.  Gastric and duodenal biopsies were negative.     Colonoscopy showed 4 aphthous ulcers in the terminal ileum. Some minimal erythema in the distal rectum. Biopsies of the ileum revealed some nonspecific minimally active chornic ileitis possibly due to backwash ileitis per pathologist. The rectal biopsy showed some minimally active UC, but the remainder of the colon biopsies were negative.      Colonscopy 10/2021 - as above - ulceration in ileum. Biopsies chronic active ileitis. Mild inflammation in ascending colon. More c/w Crohn's.  Enterography: MRE 11/10/21  1. Minimal wall thickening and minimal patchy increased enhancement in  the terminal ileum suggests very mild ileitis. Remainder of the small  bowel is normal.  2. Myomatous uterus.  C diff: None      sIBDQ:   IBDQ Score Date IBDQ - Total Score  (Higher score better)   4/25/2022 63   11/17/2021 63       IBD Health Care Maintenance:    Vaccinations:  All patients on biologics should avoid live vaccines.    -- Influenza (every year)  -- TdaP (every 10 years)  -- Pneumococcal Pneumonia (once plus booster at 5 years)  -- Yearly assessment for latent Tb (verbal screening and exam, PPD or QuantiFERON-Tb testing)     One time confirmation of immunity or serologies:  -- Hepatitis A (serologies or immunizations)  -- Hepatitis B (serologies or immunizations)  -- Varicella  -- MMR  -- HPV (all aged 18-26)  -- Meningococcal meningitis (all patients at risk for meningitis)     Bone mineral density screening   -- Recommend all patients supplement with calcium and vitamin D  -- Given prior steroid use recommend DEXA if not already done     Cancer Screening:  Colon cancer screening:  Last colonoscopy 8/2020 with negative dysplasia surveillance.      Cervical cancer screening: Per OBGYN     Skin cancer screening: Annual visual exam of skin by dermatologist since patient is immunocompromised  Depression Screening:  -- Over the last month, have you felt down, depressed, or  hopeless? no  -- Over the last month, have you felt little interest or pleasure doing things? no    Misc:  -- Avoid tobacco use  -- Avoid NSAIDs as there is potentially a 25% chance of causing an IBD flare    Return to clinic in 7 months (after colonoscopy)    Thank you for this consultation.  It was a pleasure to participate in the care of this patient; please contact us with any further questions.  I spent a total of 45 minutes, face to face, was spent with this patient, >50% of which was counseling regarding the above delineated issues.    This note was created with voice recognition software, and while reviewed for accuracy, typos may remain.     Ruperto Cain MD  Division of Gastroenterology, Hepatology and Nutrition  [facility]  Pager: [phone]      HPI:   Currently, here for follow up. Doing great! BMs regular. Constipation resolved since avoiding oats. No blood. No tenesmus.    Started ustekinumab. Did get infusion and first injection. No complaints.    No EIM.    ROS:    No fevers or chills  No weight loss  No blurry vision, double vision or change in vision  No sore throat  No lymphadenopathy  No headache, paraesthesias, or weakness in a limb  No shortness of breath or wheezing  No chest pain or pressure  No arthralgias or myalgias  No rashes or skin changes  No odynophagia or dysphagia  No BRBPR, hematochezia, melena  No dysuria, frequency or urgency  No hot/cold intolerance or polyria  No anxiety or depression    Extra intestinal manifestations of IBD:  No uveitis/episcleritis  No aphthous ulcers   No arthritis   No erythema nodosum/pyoderma gangrenosum.     PERTINENT PAST MEDICAL HISTORY:  No past medical history on file.    PREVIOUS SURGERIES:  Past Surgical History:   Procedure Laterality Date     COLONOSCOPY N/A 10/18/2021    Procedure: COLONOSCOPY, WITH BIOPSY;  Surgeon: Ruperto Cain MD;  Location: [facility] OR       PREVIOUS ENDOSCOPY:  Results for orders placed or performed  during the hospital encounter of 10/18/21   COLONOSCOPY   Result Value Ref Range    COLONOSCOPY       [facility]  [address] [place], [place] [address]., [place] [address] [phone]     Endoscopy Department  _______________________________________________________________________________  Patient Name: Ceci Lopez            Procedure Date: 10/18/2021 6:44 AM  MRN: [id number]                       Account Number: [id number]  YOB: 1970             Admit Type: Outpatient  Age: 50                               Room: Pro 5  Gender: Female                        Note Status: Finalized  Attending MD: Ruperto Cain MD    Total Sedation Time:   _______________________________________________________________________________     Procedure:           Colonoscopy  Indications:         49 YO F with history of left sided ulcerative colitis.                        Doing well on mesalamine (symptomatic remission). Prior                        colonoscopy in 2020 showed aphthae ulcers in TI - ?                        backwash ileitis. Colonoscopy today to restage disease                         as she is establishing care in our clinic.  Providers:           Ruperto Cain MD, [place] Colindres  Referring MD:        Ruperto Cain MD  Medicines:           Monitored Anesthesia Care  Complications:       No immediate complications.  _______________________________________________________________________________  Procedure:           Pre-Anesthesia Assessment:                       - See EPIC H and P note                       After obtaining informed consent, the colonoscope was                        passed under direct vision. Throughout the procedure,                        the patient's blood pressure, pulse, and oxygen                        saturations were monitored continuously. The Colonoscope                        was introduced through the anus and advanced  to the                        terminal ileum, with identification of the appendiceal                        orifice and IC valve. The colonoscopy was per formed                        without difficulty. The patient tolerated the procedure                        well. The quality of the bowel preparation was evaluated                        using the BBPS (Harmony Bowel Preparation Scale) with                        scores of: Right Colon = 3, Transverse Colon = 3 and                        Left Colon = 3 (entire mucosa seen well with no residual                        staining, small fragments of stool or opaque liquid).                        The total BBPS score equals 9.                                                                                   Findings:       The perianal and digital rectal examinations were normal.       The terminal ileum contained multiple ulcers (largest 10 mm).        Serpigenous. No bleeding was present. No stigmata of recent bleeding        were seen. Biopsies were taken with a cold forceps for histology.       The terminal ileum contained multiple scattered non-bleeding aphthae. No        stigmata of  recent bleeding were seen. Mucosa was biopsied with a cold        forceps for histology.       Inflammation characterized by congestion (edema), erosions, erythema and        friability was found as patches in the ascending colon. Biopsied as part        of the cecum/ascending jar. The rectum, the sigmoid colon, the        descending colon and the transverse colon were spared. This was mild in        severity, and when compared to previous examinations, the findings are        worsened.       Four biopsies were taken every 10 cm with a cold forceps from the entire        colon for dysplasia surveillance. These biopsy specimens from the        cecum/ascending, transverse colon, descending/sigmoid colon and rectum        were sent to Pathology.       The exam was otherwise without  abnormality on direct and retroflexion        views.                                                                                   Impression:          - Multiple ulcers in the terminal ileum. Biopsied.                        - Aphtha in the terminal ileum. Biopsied.                       - Inflammatory bowel disease. Inflammation was found in                        the ascending colon. This was mild in severity, worsened                        compared to previous examinations.                       - The examination was otherwise normal on direct and                        retroflexion views.                       - Biopsies for surveillance were taken from the entire                        colon.                       - Inflammation in the ileum appeared more than backwash                        ileitis. Looked more consistent with Crohn's Disease                        (moderate). Will follow up in clinic to discuss                        treatment options.  Recommendation:      - Discharge patient to home (with escort).                       - Await pathology results.                       - Return to GI clinic as previously scheduled.                       - Will arrange MRE before  follow up                                                                                       ____________________  Ruperto Cain MD  10/18/2021 8:30:35 AM  I was physically present for the entire viewing portion of the exam.  __________________________  Signature of teaching physician  Ruperto Cain MD  Number of Addenda: 0    Note Initiated On: 10/18/2021 6:44 AM  Scope In:  Scope Out:     ]    ALLERGIES:   No Known Allergies    PERTINENT MEDICATIONS:    Current Outpatient Medications:      mesalamine (APRISO ER) 0.375 g 24 hr capsule, Take 4 capsules (1.5 g) by mouth daily, Disp: 360 capsule, Rfl: 3     ustekinumab (STELARA) 90 MG/ML, Inject 90 mg 1 pen subcutaneously every 8 weeks Infusion on January 24 first  injection due March 21, Disp: 1 mL, Rfl: 4    SOCIAL HISTORY:  Social History     Socioeconomic History     Marital status:      Spouse name: Not on file     Number of children: Not on file     Years of education: Not on file     Highest education level: Not on file   Occupational History     Not on file   Tobacco Use     Smoking status: Never Smoker     Smokeless tobacco: Never Used   Substance and Sexual Activity     Alcohol use: Not on file     Drug use: Not on file     Sexual activity: Not on file   Other Topics Concern     Not on file   Social History Narrative     Not on file     Social Determinants of Health     Financial Resource Strain: Not on file   Food Insecurity: Not on file   Transportation Needs: Not on file   Physical Activity: Not on file   Stress: Not on file   Social Connections: Not on file   Intimate Partner Violence: Not on file   Housing Stability: Not on file       FAMILY HISTORY:  No family history on file.    Past/family/social history reviewed and no changes    PHYSICAL EXAMINATION:  Constitutional: aaox3, cooperative, pleasant, not dyspneic/diaphoretic, no acute distress  Vitals reviewed: There were no vitals taken for this visit.  Wt:   Wt Readings from Last 2 Encounters:   01/24/22 57 kg (125 lb 9.6 oz)   11/17/21 52.6 kg (116 lb)      Eyes: Sclera anicteric/injected  Respiratory: Unlabored breathing  Skin: no jaundice  Psych: Normal affect      PERTINENT STUDIES:  Most recent CBC:  Recent Labs   Lab Test 08/25/21  1111   WBC 8.8   HGB 12.0   HCT 36.9        Most recent hepatic panel:  Recent Labs   Lab Test 08/25/21  1110   ALT 31   AST 22     Most recent creatinine:  Recent Labs   Lab Test 08/25/21  1110   CR 0.84                   Again, thank you for allowing me to participate in the care of your patient.        Sincerely,        Ruperto Cain MD

## 2022-04-27 NOTE — PROGRESS NOTES
Ceci is a 51 year old who is being evaluated via a billable video visit.      How would you like to obtain your AVS? MyChart  If the video visit is dropped, the invitation should be resent by: Send to e-mail at: [email or web address]  Will anyone else be joining your video visit? No       Video start: 3:12  Video end: 3:22      IBD CLINIC VISIT    CC/REFERRING MD:  No ref. provider found  REASON FOR CONSULTATION: follow up CD    ASSESSMENT/PLAN:    1. Crohn's ileocolitis - symptomatic remission on ustekinumab and Apriso. Ok to taper apriso. She has always felt well so will be important to check for mucosal healing.    -continue ustekinumab  -taper apriso (see pt instructions)  -check labs q 3 months  -check colonoscopy in 6 months    2. Constipation - evidence of obstructive defacography by pelvic floor eval. However, pt stopped eating oats and constipation has resolved. Hold off on biofeedback.    3. HCM - referral to MTM    Also refer patient to primary care clinic to establish care.      IBD HISTORY  Age at diagnosis: 2005  Extent of disease: initially left sided - however most recent colonoscopy shows ileitis and ascending colitis. Biopsies with inflammation throughout colon except descending/sigmoid  Disease phenotype: inflammatory  Ruthy-anal disease: none  Current CD medications:   -Apriso  -Ustekinumab  Prior IBD surgeries: none  Prior IBD Medications: other mesalamine    DRUG MONITORING  TPMT enzyme activity: 28 WNL    6-TGN/6-MMPN levels: --    Biologic concentration: --    DISEASE ASSESSMENT  Labs  Recent Labs   Lab Test 08/25/21  1110   CRP <2.9   Fecal calprotectin: None  Endoscopy:   August 21, 2020 EGD and Colonoscopy  EGD was normal. Gastric and duodenal biopsies were negative.     Colonoscopy showed 4 aphthous ulcers in the terminal ileum. Some minimal erythema in the distal rectum. Biopsies of the ileum revealed some nonspecific minimally active chornic ileitis possibly due to backwash ileitis per  pathologist. The rectal biopsy showed some minimally active UC, but the remainder of the colon biopsies were negative.      Colonscopy 10/2021 - as above - ulceration in ileum. Biopsies chronic active ileitis. Mild inflammation in ascending colon. More c/w Crohn's.  Enterography: MRE 11/10/21  1. Minimal wall thickening and minimal patchy increased enhancement in  the terminal ileum suggests very mild ileitis. Remainder of the small  bowel is normal.  2. Myomatous uterus.  C diff: None      sIBDQ:   IBDQ Score Date IBDQ - Total Score  (Higher score better)   4/25/2022 63   11/17/2021 63       IBD Health Care Maintenance:    Vaccinations:  All patients on biologics should avoid live vaccines.    -- Influenza (every year)  -- TdaP (every 10 years)  -- Pneumococcal Pneumonia (once plus booster at 5 years)  -- Yearly assessment for latent Tb (verbal screening and exam, PPD or QuantiFERON-Tb testing)     One time confirmation of immunity or serologies:  -- Hepatitis A (serologies or immunizations)  -- Hepatitis B (serologies or immunizations)  -- Varicella  -- MMR  -- HPV (all aged 18-26)  -- Meningococcal meningitis (all patients at risk for meningitis)     Bone mineral density screening   -- Recommend all patients supplement with calcium and vitamin D  -- Given prior steroid use recommend DEXA if not already done     Cancer Screening:  Colon cancer screening:  Last colonoscopy 8/2020 with negative dysplasia surveillance.      Cervical cancer screening: Per OBGYN     Skin cancer screening: Annual visual exam of skin by dermatologist since patient is immunocompromised  Depression Screening:  -- Over the last month, have you felt down, depressed, or hopeless? no  -- Over the last month, have you felt little interest or pleasure doing things? no    Misc:  -- Avoid tobacco use  -- Avoid NSAIDs as there is potentially a 25% chance of causing an IBD flare    Return to clinic in 7 months (after colonoscopy)    Thank you for  this consultation.  It was a pleasure to participate in the care of this patient; please contact us with any further questions.  I spent a total of 45 minutes, face to face, was spent with this patient, >50% of which was counseling regarding the above delineated issues.    This note was created with voice recognition software, and while reviewed for accuracy, typos may remain.     Ruperto Cain MD  Division of Gastroenterology, Hepatology and Nutrition  [facility]  Pager: [phone]      HPI:   Currently, here for follow up. Doing great! BMs regular. Constipation resolved since avoiding oats. No blood. No tenesmus.    Started ustekinumab. Did get infusion and first injection. No complaints.    No EIM.    ROS:    No fevers or chills  No weight loss  No blurry vision, double vision or change in vision  No sore throat  No lymphadenopathy  No headache, paraesthesias, or weakness in a limb  No shortness of breath or wheezing  No chest pain or pressure  No arthralgias or myalgias  No rashes or skin changes  No odynophagia or dysphagia  No BRBPR, hematochezia, melena  No dysuria, frequency or urgency  No hot/cold intolerance or polyria  No anxiety or depression    Extra intestinal manifestations of IBD:  No uveitis/episcleritis  No aphthous ulcers   No arthritis   No erythema nodosum/pyoderma gangrenosum.     PERTINENT PAST MEDICAL HISTORY:  No past medical history on file.    PREVIOUS SURGERIES:  Past Surgical History:   Procedure Laterality Date     COLONOSCOPY N/A 10/18/2021    Procedure: COLONOSCOPY, WITH BIOPSY;  Surgeon: Ruperto Cain MD;  Location: [facility] OR       PREVIOUS ENDOSCOPY:  Results for orders placed or performed during the hospital encounter of 10/18/21   COLONOSCOPY   Result Value Ref Range    COLONOSCOPY       [facility]  [address] [place] [address]., [place] [address] [phone]     Endoscopy  Department  _______________________________________________________________________________  Patient Name: Ceci Lopez            Procedure Date: 10/18/2021 6:44 AM  MRN: [id number]                       Account Number: [id number]  YOB: 1970             Admit Type: Outpatient  Age: 50                               Room: Pro 5  Gender: Female                        Note Status: Finalized  Attending MD: Ruperto Cain MD    Total Sedation Time:   _______________________________________________________________________________     Procedure:           Colonoscopy  Indications:         51 YO F with history of left sided ulcerative colitis.                        Doing well on mesalamine (symptomatic remission). Prior                        colonoscopy in 2020 showed aphthae ulcers in TI - ?                        backwash ileitis. Colonoscopy today to restage disease                         as she is establishing care in our clinic.  Providers:           Ruperto Cain MD, [place] Colindres  Referring MD:        Ruperto Cain MD  Medicines:           Monitored Anesthesia Care  Complications:       No immediate complications.  _______________________________________________________________________________  Procedure:           Pre-Anesthesia Assessment:                       - See EPIC H and P note                       After obtaining informed consent, the colonoscope was                        passed under direct vision. Throughout the procedure,                        the patient's blood pressure, pulse, and oxygen                        saturations were monitored continuously. The Colonoscope                        was introduced through the anus and advanced to the                        terminal ileum, with identification of the appendiceal                        orifice and IC valve. The colonoscopy was per formed                        without  difficulty. The patient tolerated the procedure                        well. The quality of the bowel preparation was evaluated                        using the BBPS ([place] Bowel Preparation Scale) with                        scores of: Right Colon = 3, Transverse Colon = 3 and                        Left Colon = 3 (entire mucosa seen well with no residual                        staining, small fragments of stool or opaque liquid).                        The total BBPS score equals 9.                                                                                   Findings:       The perianal and digital rectal examinations were normal.       The terminal ileum contained multiple ulcers (largest 10 mm).        Serpigenous. No bleeding was present. No stigmata of recent bleeding        were seen. Biopsies were taken with a cold forceps for histology.       The terminal ileum contained multiple scattered non-bleeding aphthae. No        stigmata of  recent bleeding were seen. Mucosa was biopsied with a cold        forceps for histology.       Inflammation characterized by congestion (edema), erosions, erythema and        friability was found as patches in the ascending colon. Biopsied as part        of the cecum/ascending jar. The rectum, the sigmoid colon, the        descending colon and the transverse colon were spared. This was mild in        severity, and when compared to previous examinations, the findings are        worsened.       Four biopsies were taken every 10 cm with a cold forceps from the entire        colon for dysplasia surveillance. These biopsy specimens from the        cecum/ascending, transverse colon, descending/sigmoid colon and rectum        were sent to Pathology.       The exam was otherwise without abnormality on direct and retroflexion        views.                                                                                   Impression:          - Multiple ulcers in the terminal ileum.  Biopsied.                        - Aphtha in the terminal ileum. Biopsied.                       - Inflammatory bowel disease. Inflammation was found in                        the ascending colon. This was mild in severity, worsened                        compared to previous examinations.                       - The examination was otherwise normal on direct and                        retroflexion views.                       - Biopsies for surveillance were taken from the entire                        colon.                       - Inflammation in the ileum appeared more than backwash                        ileitis. Looked more consistent with Crohn's Disease                        (moderate). Will follow up in clinic to discuss                        treatment options.  Recommendation:      - Discharge patient to home (with escort).                       - Await pathology results.                       - Return to GI clinic as previously scheduled.                       - Will arrange MRE before  follow up                                                                                       ____________________  Ruperto Cain MD  10/18/2021 8:30:35 AM  I was physically present for the entire viewing portion of the exam.  __________________________  Signature of teaching physician  Ruperto Cain MD  Number of Addenda: 0    Note Initiated On: 10/18/2021 6:44 AM  Scope In:  Scope Out:     ]    ALLERGIES:   No Known Allergies    PERTINENT MEDICATIONS:    Current Outpatient Medications:      mesalamine (APRISO ER) 0.375 g 24 hr capsule, Take 4 capsules (1.5 g) by mouth daily, Disp: 360 capsule, Rfl: 3     ustekinumab (STELARA) 90 MG/ML, Inject 90 mg 1 pen subcutaneously every 8 weeks Infusion on January 24 first injection due March 21, Disp: 1 mL, Rfl: 4    SOCIAL HISTORY:  Social History     Socioeconomic History     Marital status:      Spouse name: Not on file     Number of children: Not on file      Years of education: Not on file     Highest education level: Not on file   Occupational History     Not on file   Tobacco Use     Smoking status: Never Smoker     Smokeless tobacco: Never Used   Substance and Sexual Activity     Alcohol use: Not on file     Drug use: Not on file     Sexual activity: Not on file   Other Topics Concern     Not on file   Social History Narrative     Not on file     Social Determinants of Health     Financial Resource Strain: Not on file   Food Insecurity: Not on file   Transportation Needs: Not on file   Physical Activity: Not on file   Stress: Not on file   Social Connections: Not on file   Intimate Partner Violence: Not on file   Housing Stability: Not on file       FAMILY HISTORY:  No family history on file.    Past/family/social history reviewed and no changes    PHYSICAL EXAMINATION:  Constitutional: aaox3, cooperative, pleasant, not dyspneic/diaphoretic, no acute distress  Vitals reviewed: There were no vitals taken for this visit.  Wt:   Wt Readings from Last 2 Encounters:   01/24/22 57 kg (125 lb 9.6 oz)   11/17/21 52.6 kg (116 lb)      Eyes: Sclera anicteric/injected  Respiratory: Unlabored breathing  Skin: no jaundice  Psych: Normal affect      PERTINENT STUDIES:  Most recent CBC:  Recent Labs   Lab Test 08/25/21  1111   WBC 8.8   HGB 12.0   HCT 36.9        Most recent hepatic panel:  Recent Labs   Lab Test 08/25/21  1110   ALT 31   AST 22     Most recent creatinine:  Recent Labs   Lab Test 08/25/21  1110   CR 0.84

## 2022-04-27 NOTE — LETTER
Date:May 18, 2022      Provider requested that no letter be sent. Do not send.       [facility]

## 2022-04-27 NOTE — PATIENT INSTRUCTIONS
It was very nice to see you again today during your virtual visit.  I am happy that things are going so well.    We will check labs.  I have ordered these.  You can get these done at your local [facility] lab.    Please continue to take your Stelara (ustekinumab).    You can decrease your Apriso time to 3 pills daily for 1 week, then 2 pills daily for 1 week, then 1 pill daily for 1 week and then stop.  Please let us know if you have worsening symptoms and to take his medications.    I will refer you to our pharmacy team to discuss healthcare maintenance.    I will have my office reach out to you to help schedule an appointment with primary care.    We will schedule colonoscopy in 6 months.    Follow-up after colonoscopy.

## 2022-04-28 ENCOUNTER — TELEPHONE (OUTPATIENT)
Dept: GASTROENTEROLOGY | Facility: CLINIC | Age: 52
End: 2022-04-28

## 2022-04-28 NOTE — TELEPHONE ENCOUNTER
Patient called to schedule colonoscopy w/Ant under MAC for Oct 2022. Let her know the schedule isn't available yet, we will defer her order until the end of July and she'll receive another call.

## 2022-05-03 ENCOUNTER — TELEPHONE (OUTPATIENT)
Dept: GASTROENTEROLOGY | Facility: CLINIC | Age: 52
End: 2022-05-03

## 2022-05-03 NOTE — TELEPHONE ENCOUNTER
MTM referral from: [facility] visit (referral by provider)    MTM referral outreach attempt #2 on May 3, 2022 at 10:33 AM      Outcome: Patient not reachable after several attempts, will route to MT Pharmacist/Provider as an FYI.  [facility] scheduling number is [phone].  Thank you for the referral.    Nidia Mejia [facility]

## 2022-05-04 ENCOUNTER — VIRTUAL VISIT (OUTPATIENT)
Dept: PHARMACY | Facility: CLINIC | Age: 52
End: 2022-05-04
Attending: INTERNAL MEDICINE
Payer: COMMERCIAL

## 2022-05-04 ENCOUNTER — LAB (OUTPATIENT)
Dept: LAB | Facility: CLINIC | Age: 52
End: 2022-05-04
Payer: COMMERCIAL

## 2022-05-04 DIAGNOSIS — K50.80 CROHN'S DISEASE OF BOTH SMALL AND LARGE INTESTINE WITHOUT COMPLICATION (H): Primary | ICD-10-CM

## 2022-05-04 DIAGNOSIS — K50.80 CROHN'S DISEASE OF BOTH SMALL AND LARGE INTESTINE WITHOUT COMPLICATION (H): ICD-10-CM

## 2022-05-04 LAB
ALBUMIN SERPL-MCNC: 3.9 G/DL (ref 3.5–5)
ALP SERPL-CCNC: 60 U/L (ref 45–120)
ALT SERPL W P-5'-P-CCNC: 29 U/L (ref 0–45)
ANION GAP SERPL CALCULATED.3IONS-SCNC: 10 MMOL/L (ref 5–18)
AST SERPL W P-5'-P-CCNC: 25 U/L (ref 0–40)
BILIRUB DIRECT SERPL-MCNC: <0.1 MG/DL
BILIRUB SERPL-MCNC: 0.3 MG/DL (ref 0–1)
BUN SERPL-MCNC: 10 MG/DL (ref 8–22)
C REACTIVE PROTEIN LHE: <0.1 MG/DL (ref 0–0.8)
CALCIUM SERPL-MCNC: 9.5 MG/DL (ref 8.5–10.5)
CHLORIDE BLD-SCNC: 102 MMOL/L (ref 98–107)
CO2 SERPL-SCNC: 27 MMOL/L (ref 22–31)
CREAT SERPL-MCNC: 0.8 MG/DL (ref 0.6–1.1)
ERYTHROCYTE [DISTWIDTH] IN BLOOD BY AUTOMATED COUNT: 12.6 % (ref 10–15)
ERYTHROCYTE [SEDIMENTATION RATE] IN BLOOD BY WESTERGREN METHOD: 13 MM/HR (ref 0–20)
GFR SERPL CREATININE-BSD FRML MDRD: 90 ML/MIN/1.73M2
GLUCOSE BLD-MCNC: 81 MG/DL (ref 70–125)
HCT VFR BLD AUTO: 39.3 % (ref 35–47)
HGB BLD-MCNC: 12.6 G/DL (ref 11.7–15.7)
MCH RBC QN AUTO: 30.4 PG (ref 26.5–33)
MCHC RBC AUTO-ENTMCNC: 32.1 G/DL (ref 31.5–36.5)
MCV RBC AUTO: 95 FL (ref 78–100)
PLATELET # BLD AUTO: 241 10E3/UL (ref 150–450)
POTASSIUM BLD-SCNC: 4.2 MMOL/L (ref 3.5–5)
PROT SERPL-MCNC: 7.6 G/DL (ref 6–8)
RBC # BLD AUTO: 4.14 10E6/UL (ref 3.8–5.2)
SODIUM SERPL-SCNC: 139 MMOL/L (ref 136–145)
WBC # BLD AUTO: 6.3 10E3/UL (ref 4–11)

## 2022-05-04 PROCEDURE — 80053 COMPREHEN METABOLIC PANEL: CPT

## 2022-05-04 PROCEDURE — 86140 C-REACTIVE PROTEIN: CPT

## 2022-05-04 PROCEDURE — 82248 BILIRUBIN DIRECT: CPT

## 2022-05-04 PROCEDURE — 36415 COLL VENOUS BLD VENIPUNCTURE: CPT

## 2022-05-04 PROCEDURE — 85652 RBC SED RATE AUTOMATED: CPT

## 2022-05-04 PROCEDURE — 99607 MTMS BY PHARM ADDL 15 MIN: CPT | Performed by: PHARMACIST

## 2022-05-04 PROCEDURE — 99605 MTMS BY PHARM NP 15 MIN: CPT | Performed by: PHARMACIST

## 2022-05-04 PROCEDURE — 85027 COMPLETE CBC AUTOMATED: CPT

## 2022-05-04 RX ORDER — VITAMIN B COMPLEX
50 TABLET ORAL DAILY
COMMUNITY

## 2022-05-04 NOTE — PROGRESS NOTES
Medication Therapy Management (MTM) Encounter    ASSESSMENT:                            Medication Adherence/Access: No issues identified    Crohn's: Reviewed health maintenance as included below. Given ustekinumab therapy, she may benefit from a Pneumovax-23 booster in 2023. We also discussed consideration for Shingrix, though risk of zoster from ustekinumab therapy is not as great as other biologics or small molecules. She is interested in getting an additional dose of the COVID-19 vaccine, which is reasonable given biologic therapy. We discussed that the risk of skin cancer with ustekinumab therapy is low. Studies show a higher risk in the psoriasis population, and it is thought that this may be related to underlying risk factors/exposure to light therapy as part of therapy. Encouraged at least baseline screening and then can work with dermatology to determine appropriate timeline for follow-up based on other risk factors.    PLAN:                            1.  Establish care with a primary care provider as planned    2. Dermatology referral per patient preference    -- ordered per CPA with Dr. Cain    3. Ceci to consider the following vaccines:   -- Shingrix   -- Pneumovax-23 in 2023   -- Covid-19 fifth dose if desired     Follow-up: 1 year for health maintenance review, sooner if needed     Future Consideration  -- vitamin D level    SUBJECTIVE/OBJECTIVE:                          Ceci Lopez is a 51 year old female contacted via secure video for an initial visit. She was referred to me from Dr. Cain.      Reason for visit: health care maintenance review on Stelara  -- skin checks?    Allergies/ADRs: Reviewed in chart  Tobacco: She reports that she has never smoked. She has never used smokeless tobacco.  Alcohol: none    Medication Adherence/Access: no issues reported    Crohn's:   Stelara 90 mg every 8 weeks  Mesalamine 1.5 g daily (generic Apriso) on taper per Dr. Cain  Ca/D daily   Vitamin D 25 mcg  daily     Most recently saw Dr. Cain on 4/27. Plan to continue ustekinumab and taper Apriso. Plan for colonoscopy in six months. Notes did ustekinumab infusion (1/24/22) and first injection. Notes she was previous asymptomatic so it may be difficult for her to tell if it is working. Notes she takes a b-12/iron supplements every other day right now. Vegan diet, so she eats a lot of fresh foods. She does not currently have a primary care provider, but is working on establishing care with one. Also considering a gynecologist as she notes that she may not be up-to-date with women's health screenings.    Current CD medications:   -Apriso  -Ustekinumab  Prior IBD surgeries: none  Prior IBD Medications: other mesalamine    IBD Health Care Maintenance:    Vaccinations:  All patients on biologics should avoid live vaccines.    -- Influenza (every year) 11/4/2021  -- TdaP (every 10 years) 12/9/2019  -- Pneumococcal Pneumonia    Prevnar-13: 7/25/2018   Pneumovax-23: 9/25/2018  -- COVID-19 4/1/21, 4/29/21, 9/2/21, 2/4/22  -- Yearly assessment for latent Tb (verbal screening and exam, PPD or QuantiFERON-Tb testing)      - negative 11/2021    One time confirmation of immunity or serologies:  -- Hepatitis A (serologies or immunizations) 3/12/2009 and 12/9/2019  -- Hepatitis B (serologies or immunizations) serologies 8/2021 indicate immunity  -- Varicella/Zoster had chickenpox     Due to the immunosuppression in this patient, I would not advise administration of live vaccines such as varicella/VZV, intranasal influenza, MMR, or yellow fever vaccine (if traveling).      Pre-Biologic Screening: completed 8/2021  -- Hep B Surface Antibody serologies indicate immunity  -- Hep B Surface Antigen non-reactive  -- Hep B Core Antibody non-reactive    Cancer Screening:  Colon cancer screening:  Last colonoscopy 8/2020 with negative dysplasia surveillance.     Cervical cancer screening: Per OBGYN    Skin cancer screening: Annual visual exam  of skin by dermatologist since patient is immunocompromised    Depression Screening:  -- Over the last month, have you felt down, depressed, or hopeless? No  -- Over the last month, have you felt little interest or pleasure doing things? No    Misc:  -- Avoid tobacco use  -- Avoid NSAIDs as there is potentially a 25% chance of causing an IBD flare    ----------------    I spent 42 minutes with this patient today. All changes were made via collaborative practice agreement with Dr. Cain. A copy of the visit note was provided to the patient's provider(s).    The patient was sent via [employer] a summary of these recommendations.     Sanjuanita HookD, BCACP  MTM Pharmacist   [facility]  Phone: [phone]    Telemedicine Visit Details  Type of service:  Video Conference via [employer]  Start Time: 10:02 AM  End Time: 10:44 AM  Originating Location (patient location): Home  Distant Location (provider location):  [facility]     Medication Therapy Recommendations  Crohn's disease (H)    Rationale: Preventive therapy - Needs additional medication therapy - Indication   Recommendation: Start Medication - SHINGRIX IM   Status: Accepted - no CPA Needed   Note: Consider Shingrix, Pneumovax-23 in 2023 and the Shingrix 5th dose if desired.

## 2022-05-10 NOTE — PATIENT INSTRUCTIONS
"Recommendations from today's MTM visit:                                                    MTM (medication therapy management) is a service provided by a clinical pharmacist designed to help you get the most of out of your medicines.   Today we reviewed what your medicines are for, how to know if they are working, that your medicines are safe and how to make your medicine regimen as easy as possible.      1.  Establish care with a primary care provider as planned    2. Dermatology referral per patient preference. We would encourage at least a baseline skin check given IBD on a biologic, then you and your provider can determine the appropriate timeline for follow-up.    -- Referral placed    3. Ceci to consider the following vaccines:   -- Shingrix   -- Pneumovax-23 in 2023   -- Covid-19 fifth dose if desired     Follow-up: 1 year for health maintenance review, sooner if needed     Future Consideration  -- vitamin D level    It was great speaking with you today.  I value your experience and would be very thankful for your time in providing feedback in our clinic survey. In the next few days, you may receive an email or text message from [employer] with a link to a survey related to your  clinical pharmacist.\"     To schedule another MTM appointment, please call the clinic directly or you may call the MTM scheduling line at [phone] or toll-free at [phone].     My Clinical Pharmacist's contact information:                                                      Please feel free to contact me with any questions or concerns you have.      Sanjuanita Constantino PharmD, BCACP  MTM Pharmacist   [facility]  Phone: [phone]    "

## 2022-05-14 ENCOUNTER — NURSE TRIAGE (OUTPATIENT)
Dept: NURSING | Facility: CLINIC | Age: 52
End: 2022-05-14

## 2022-05-14 RX ORDER — VITAMIN B COMPLEX
25 TABLET ORAL DAILY
Status: CANCELLED | OUTPATIENT
Start: 2022-05-14

## 2022-05-14 NOTE — TELEPHONE ENCOUNTER
Pt tested positive for Covid today after symptoms started yesterday. Pt reports fever, headache, body aches, and sore throat. Pt denies shortness of breath, chest tightness, or difficulty breathing.   Care advice given:  GENERAL CARE ADVICE FOR COVID-19 SYMPTOMS:  * The symptoms are generally treated the same whether you have COVID-19, influenza or some other respiratory virus.  * Cough: Use cough drops.  * Feeling dehydrated: Drink extra liquids. If the air in your home is dry, use a humidifier.  * Fever: For fever over 101 F (38.3 C), take acetaminophen every 4 to 6 hours (Adults 650 mg) OR ibuprofen every 6 to 8 hours (Adults 400 mg). Before taking any medicine, read all the instructions on the package. Do not take aspirin unless your doctor has prescribed it for you.  * Muscle aches, headache, and other pains: Often this comes and goes with the fever. Take acetaminophen every 4 to 6 hours (Adults 650 mg) OR ibuprofen every 6 to 8 hours (Adults 400 mg). Before taking any medicine, read all the instructions on the package.  * Sore throat: Try throat lozenges, hard candy or warm chicken broth.  Per protocol pt should schedule appointment with PCP now. Pt reports she has Crohns disease and is immuno-compromised and is interested in the Covid treatment. Pt was transferred to scheduling to get scheduled for a virtual visit to receive Covid treatment. Pt was advised to call back if she develops chest tightness, difficulty breathing, or fever over 103.0.     Pt verbalized understanding.    Phan Long RN on 5/14/2022 at 5:22 PM        Caller Name (Patient: Ceci Lopez)    Gather patient reported symptoms   Assessment   Current Symptoms at time of phone call, reported by patient: (if no symptoms, document No symptoms] Sore throat, headache, fever.   Date of Symptom(s) onset (if applicable) 5/13/2022     If at time of call, Patients symptoms hare worsened, the Patient should contact 911 or have someone drive them  to Emergency Dept promptly:      If Patient calling 911, inform 911 personal that you have tested positive for the Coronavirus (COVID-19).  Place mask on and await 911 to arrive.    If Emergency Dept, If possible, please have another adult drive you to the Emergency Dept but you need to wear mask when in contact with other people.      Monoclonal Antibody Administration    You may be eligible to receive a new treatment with a monoclonal antibody for preventing hospitalization in patients at high risk for complications from COVID-19. This medication is still experimental and available on a limited basis; it is given through an IV and must be given at an infusion center. Please note that not all people who are eligible will receive the medication since it is in limited supply.  Is the patient symptomatic and onset of symptoms within the last 7 days?  Yes  Is the patient interested in a visit with a provider to discuss treatment options?: Yes  Is the patient seen at [facility]?  No: Warm transfer caller to [phone] to be scheduled with a virtual urgent provider.  During transfer, instruct  on appropriate time frame for visit        How can I protect others?    These guidelines are for isolating before returning to work, school or .       If you DO have symptoms:  o Stay home and away from others  - For at least 5 days after your symptoms started, AND   - You are fever free for 24 hours (with no medicine that reduces fever), AND  - Your other symptoms are better.  o Wear a mask for 10 full days any time you are around others.    If you DON'T have symptoms:  o Stay at home and away from others for at least 5 days after your positive test.  o Wear a mask for 10 full days any time you are around others.    There may be different guidelines for healthcare facilities. Please check with the specific sites before arriving.     If you've been told by a doctor that you were severely ill with  COVID-19 or are immunocompromised, you should isolate for at least 10 days.    You should not go back to work until you meet the guidelines above for ending your home isolation. You don't need to be retested for COVID-19 before going back to work--studies show that you won't spread the virus if it's been at least 10 days since your symptoms started (or 20 days, if you have a weak immune system).    Employers, schools, and daycares: This is an official notice for this person's medical guidelines for returning in-person. They must meet the above guidelines before going back to work, school, or  in person.      Reason for Disposition    HIGH RISK for severe COVID complications (e.g., weak immune system, age > 64 years, obesity with BMI > 25, pregnant, chronic lung disease or other chronic medical condition)  (Exception: Already seen by PCP and no new or worsening symptoms.)    Additional Information    Negative: SEVERE difficulty breathing (e.g., struggling for each breath, speaks in single words)    Negative: Difficult to awaken or acting confused (e.g., disoriented, slurred speech)    Negative: Bluish (or gray) lips or face now    Negative: Shock suspected (e.g., cold/pale/clammy skin, too weak to stand, low BP, rapid pulse)    Negative: Sounds like a life-threatening emergency to the triager    Negative: [1] Diagnosed or suspected COVID-19 AND [2] symptoms lasting 3 or more weeks    Negative: [1] COVID-19 exposure AND [2] no symptoms    Negative: COVID-19 vaccine reaction suspected (e.g., fever, headache, muscle aches) occurring 1 to 3 days after getting vaccine    Negative: COVID-19 vaccine, questions about    Negative: [1] Lives with someone known to have influenza (flu test positive) AND [2] flu-like symptoms (e.g., cough, runny nose, sore throat, SOB; with or without fever)    Negative: [1] Adult with possible COVID-19 symptoms AND [2] triager concerned about severity of symptoms or other causes     Negative: COVID-19 and breastfeeding, questions about    Negative: SEVERE or constant chest pain or pressure  (Exception: Mild central chest pain, present only when coughing.)    Negative: MODERATE difficulty breathing (e.g., speaks in phrases, SOB even at rest, pulse 100-120)    Negative: [1] Headache AND [2] stiff neck (can't touch chin to chest)    Negative: Oxygen level (e.g., pulse oximetry) 90 percent or lower    Negative: Chest pain or pressure    Negative: Patient sounds very sick or weak to the triager    Negative: MILD difficulty breathing (e.g., minimal/no SOB at rest, SOB with walking, pulse <100)    Negative: Fever > 103 F (39.4 C)    Negative: [1] Fever > 101 F (38.3 C) AND [2] age > 60 years    Negative: [1] Fever > 100.0 F (37.8 C) AND [2] bedridden (e.g., nursing home patient, CVA, chronic illness, recovering from surgery)    Protocols used: CORONAVIRUS (COVID-19) DIAGNOSED OR [id number] 1.18.2022

## 2022-05-14 NOTE — TELEPHONE ENCOUNTER
Pt is calling back again because she is not able to get treatment for Covid Antivirals until Monday, and is very concerned that she needs this right now. Pt wants to speak with the on call GI doctor about this.   On call doctor for GI was paged at 6pm to call patient (Ceci) at [phone]. Pt was advised to call back if she does not hear back in 20 minutes.     Phan Long RN on 5/14/2022 at 6:01 PM

## 2022-05-14 NOTE — TELEPHONE ENCOUNTER
"PCP: will be seeing Dr Ruperto Cain @ [facility] clinic as gastro. On Stelara. Crohn's. Immunocompromised.   Triaged earlier today. Virtual appointment given for Monday.  Gastro team recommended she start antiviral medication if she tested positive, as soon as possible.   Patient called the nurse for Gastro, and the call was directed to FNA. Gastro on call told her they could not help her, and referred her to her primary MD.    PCP: has not had one in awhile, only sees Gastro.   She had been referred to Dr Callaway @ [facility] by Dr Cain.    Test was positive today, SX began yesterday.      Dr Callaway on call, contacted @ 6:50pm, He will review patient's record and call us back later tonight.   Contacted patient with this information.   Pharmacy: [facility] on [address] in [place]. Closes at 10p.   If 24 hr pharmacy is needed: [facility] on [address] in [facility]     Rosalie Childress RN Triage Nurse Advisor 7:06 PM 5/14/2022  Reason for Disposition    [1] Request for URGENT new prescription or refill of \"essential\" medication (i.e., likelihood of harm to patient if not taken) AND [2] triager unable to fill per unit policy    Additional Information    Negative: Drug overdose and triager unable to answer question    Negative: Caller requesting information unrelated to medicine    Negative: Caller requesting a prescription for Strep throat and has a positive culture result    Negative: Rash while taking a medication or within 3 days of stopping it    Negative: Immunization reaction suspected    Negative: [1] Asthma and [2] having symptoms of asthma (cough, wheezing, etc.)    Negative: [1] Influenza symptoms AND [2] anti-viral med prescription request, such as Tamiflu    Negative: [1] Symptom of illness (e.g., headache, abdominal pain, earache, vomiting) AND [2] more than mild    Negative: MORE THAN A DOUBLE DOSE of a prescription or over-the-counter (OTC) drug    Negative: [1] DOUBLE DOSE (an " extra dose or lesser amount) of over-the-counter (OTC) drug AND [2] any symptoms (e.g., dizziness, nausea, pain, sleepiness)    Negative: [1] DOUBLE DOSE (an extra dose or lesser amount) of prescription drug AND [2] any symptoms (e.g., dizziness, nausea, pain, sleepiness)    Negative: Took another person's prescription drug    Negative: [1] DOUBLE DOSE (an extra dose or lesser amount) of prescription drug AND [2] NO symptoms (Exception: a double dose of antibiotics)    Negative: Diabetes drug error or overdose (e.g., took wrong type of insulin or took extra dose)    Protocols used: MEDICATION QUESTION CALL-A-

## 2022-05-16 ENCOUNTER — PATIENT OUTREACH (OUTPATIENT)
Dept: GASTROENTEROLOGY | Facility: CLINIC | Age: 52
End: 2022-05-16

## 2022-05-16 NOTE — TELEPHONE ENCOUNTER
Patient tested positive for covid on May 14   Dry scratchy throat  Some tightness in chest   Non productive cough  Highest temp was 99.9   Pressure in her ears  Has oximeter 99 sats  Due for stelara on May 20   Questioning if she should order '  Okay to order   Has MTM appointment with Ms Constantino tomorrow to discuss '  Discussed

## 2022-05-17 ENCOUNTER — VIRTUAL VISIT (OUTPATIENT)
Dept: PHARMACY | Facility: CLINIC | Age: 52
End: 2022-05-17
Payer: COMMERCIAL

## 2022-05-17 DIAGNOSIS — K50.80 CROHN'S DISEASE OF BOTH SMALL AND LARGE INTESTINE WITHOUT COMPLICATION (H): Primary | ICD-10-CM

## 2022-05-17 PROCEDURE — 99207 PR NO CHARGE LOS: CPT | Performed by: PHARMACIST

## 2022-05-17 NOTE — PATIENT INSTRUCTIONS
It was nice speaking with you today.    We will plan to connect on Friday before dosing Stelara to review COVID-19 status.    Sanjuanita Constantino PharmD, BCACP  MTM Pharmacist   [facility]  Phone: [phone]

## 2022-05-17 NOTE — PROGRESS NOTES
Clinical Pharmacy Consult:                                                    Ceci Lopez is a 51 year old female contacted via secure video for a clinical pharmacist consult.  She was referred to me from Noelle Sutherland RNCC.     Ceci is not seen for CMR today due to time/request for consult from care team.    Reason for Consult: COVID+ on Stelara    Discussion:     COVID-19 symptoms: Friday 5/13  COVID-19 positive test: Saturday 5/14  Medication: Stelara due Friday   Paxlovid: yes - taking     Ceci notes that she is doing okay - isolating from her family. Feels this was likely an exposure from work. Notes symptoms started with very dry throat, felt like she was coming down with something, fever (not higher than 100), tired, headache/aches and cold symptoms. Not a lot of coughing/congestion. Taking acetaminophen and feeling a bit better each day. She did start on Paxlovid over the weekend (one dose Saturday).     We had recently met and she talked about getting the COVID-19 booster coming up. Reviewed information surrounding this given natural COVID-19 infection. Would recommend she push this out 2-3 months. Reviewed current data surrounding natural antibody production and potential for prolonged viral shedding given immunosuppression.     She is due for her Stelara on Friday. This is her third dose overall (second self-injection dose). We discussed general guideline to hold immunosuppression for 14 days to ensure symptomatic resolution. These guidelines were developed in the pre-vaccination era. She is s/p four vaccines and is receiving paxlovid, so we will plan for resuming around time of symptomatic resolution. Discussed risks/benefits of resuming or continuing to hold. She does not typically have symptoms associated with her IBD. If she is symptomatically back to baseline at the day 7 mike, could consider resuming at that time. If not, will continue to follow through day 14 mike.    Plan:  1. Check-in on Friday  with COVID-19 symptom status before resuming Stelara

## 2022-07-11 ENCOUNTER — ANCILLARY PROCEDURE (OUTPATIENT)
Dept: GENERAL RADIOLOGY | Facility: CLINIC | Age: 52
End: 2022-07-11
Attending: INTERNAL MEDICINE
Payer: COMMERCIAL

## 2022-07-11 ENCOUNTER — OFFICE VISIT (OUTPATIENT)
Dept: INTERNAL MEDICINE | Facility: CLINIC | Age: 52
End: 2022-07-11
Payer: COMMERCIAL

## 2022-07-11 VITALS
HEIGHT: 63 IN | DIASTOLIC BLOOD PRESSURE: 77 MMHG | BODY MASS INDEX: 22.41 KG/M2 | WEIGHT: 126.5 LBS | HEART RATE: 73 BPM | OXYGEN SATURATION: 100 % | SYSTOLIC BLOOD PRESSURE: 120 MMHG

## 2022-07-11 DIAGNOSIS — Z13.220 SCREENING CHOLESTEROL LEVEL: ICD-10-CM

## 2022-07-11 DIAGNOSIS — M25.561 BILATERAL CHRONIC KNEE PAIN: ICD-10-CM

## 2022-07-11 DIAGNOSIS — M25.562 BILATERAL CHRONIC KNEE PAIN: ICD-10-CM

## 2022-07-11 DIAGNOSIS — G89.29 BILATERAL CHRONIC KNEE PAIN: ICD-10-CM

## 2022-07-11 DIAGNOSIS — Z12.31 ENCOUNTER FOR SCREENING MAMMOGRAM FOR BREAST CANCER: Primary | ICD-10-CM

## 2022-07-11 PROCEDURE — 90677 PCV20 VACCINE IM: CPT | Performed by: INTERNAL MEDICINE

## 2022-07-11 PROCEDURE — 73562 X-RAY EXAM OF KNEE 3: CPT | Mod: RT | Performed by: RADIOLOGY

## 2022-07-11 PROCEDURE — 99396 PREV VISIT EST AGE 40-64: CPT | Mod: 25 | Performed by: INTERNAL MEDICINE

## 2022-07-11 PROCEDURE — 90471 IMMUNIZATION ADMIN: CPT | Performed by: INTERNAL MEDICINE

## 2022-07-11 ASSESSMENT — PAIN SCALES - GENERAL: PAINLEVEL: NO PAIN (0)

## 2022-07-11 NOTE — PATIENT INSTRUCTIONS
Get Shingles vaccine SHINGRIX at local pharmacy    To schedule an lab appointment at the [facility], please call [phone].     To schedule a mammogram, please call radiology scheduling at [phone].

## 2022-07-11 NOTE — PROGRESS NOTES
"HPI:   Ceci Lopez is a 51 year old female with a hx of Chron's Disease presenting in clinic today for an annual physical. Pt follows closely with Dr. Cain in Gastro for management of Chron's. Pt had positive COVID test 5/14/22, received Paxlovid, doing well no lingering symptoms. Pt reports bilateral knee pain. Pain has come and gone for many years. Pt reports being unable to do much running as a young adult due to knee pain. Recently pain has gotten worse and will flare up if walking long distances. Biking traditionally ok but recently biking is causing some pain. No tylenol for it. Mom has hx of osteoporosis. Still menstruating regularly. No hx of inflammatory issues in family. Has appointment for skin check in october with derm.     Past Surgical History:   Procedure Laterality Date     COLONOSCOPY N/A 10/18/2021    Procedure: COLONOSCOPY, WITH BIOPSY;  Surgeon: Ruperto Cain MD;  [place] [facility] [place]       Exam:   Vital signs:      BP: 120/77 Pulse: 73     SpO2: 100 %     Height: 160 cm (5' 3\") Weight: 57.4 kg (126 lb 8 oz)  Estimated body mass index is 22.41 kg/m  as calculated from the following:    Height as of this encounter: 1.6 m (5' 3\").    Weight as of this encounter: 57.4 kg (126 lb 8 oz).    Vitals noted, gen, nad, cooperative, alert, neck supple nl rom, lungs with good air movement, RRR, S1, S2, no MRG, abdomen, no acute findings. Grossly normal neurological exam.     No pain today in knees, no tenderness to palpation. No obvious swelling or redness in knees.      A & P:    1)  Chron's Disease   On mesalamine and ustekinumab. Follows closely with Dr. Cain. Last visit 4/27/22, next visit 12/29/22. Colonoscopy done through Dr. Cain, last 10/18/21    2) Mammogram   Last one 2019, hx dense breast tissue, ordered today 7/11/22    3) Vaccinations   Prevnar 20 today due to pt taking ustekinumab.\    4) Skin check   Appointment with derm 10/20/22    5) Family hx of high cholesterol   Future " fasting lipid panel ordered 7/11/22    6) Bilateral knee pain   X rays ordered today 7/11/22. Referral placed for Ortho consult.     Kimberly Nissen, 2nd year Medical Student at 5:40 PM on 7/11/2022    Staff note. I personally reviewed Ms. Lopez's past medical history. I interviewed her and conducted a physical examination. I agree with the above assessment and plan.    TOMASZ Callaway MD

## 2022-07-12 ENCOUNTER — OFFICE VISIT (OUTPATIENT)
Dept: OPTOMETRY | Facility: CLINIC | Age: 52
End: 2022-07-12

## 2022-07-12 DIAGNOSIS — H52.13 MYOPIA OF BOTH EYES: Primary | ICD-10-CM

## 2022-07-12 ASSESSMENT — REFRACTION_CURRENTRX
OS_BRAND: ALCON PRECISION 1
OS_SPHERE: -2.25
OD_SPHERE: -1.75
OS_DIAMETER: 14.2
OD_BRAND: ALCON PRECISION 1
OD_SPHERE: -1.75
OD_BASECURVE: 8.30
OD_DIAMETER: 14.2
OD_CYLINDER: SPHERE
OS_CYLINDER: SPHERE
OS_SPHERE: -2.25
OS_BASECURVE: 8.30

## 2022-07-12 ASSESSMENT — REFRACTION_MANIFEST
OS_CYLINDER: +0.25
OD_ADD: +1.75
OS_SPHERE: -2.25
OD_CYLINDER: +0.25
OS_AXIS: 170
OD_AXIS: 170
OS_ADD: +1.75
OD_SPHERE: -1.75

## 2022-07-12 ASSESSMENT — VISUAL ACUITY
OS_CC: 20/20
METHOD: SNELLEN - LINEAR
CORRECTION_TYPE: CONTACTS
OD_CC: 20/20

## 2022-07-12 ASSESSMENT — CONF VISUAL FIELD
OD_NORMAL: 1
OS_NORMAL: 1

## 2022-07-13 ASSESSMENT — CUP TO DISC RATIO
OS_RATIO: 0.2
OD_RATIO: 0.2

## 2022-07-13 ASSESSMENT — EXTERNAL EXAM - LEFT EYE: OS_EXAM: NORMAL

## 2022-07-13 ASSESSMENT — TONOMETRY
OS_IOP_MMHG: 16
OD_IOP_MMHG: 15
IOP_METHOD: ICARE

## 2022-07-13 ASSESSMENT — SLIT LAMP EXAM - LIDS
COMMENTS: NORMAL
COMMENTS: NORMAL

## 2022-07-13 ASSESSMENT — EXTERNAL EXAM - RIGHT EYE: OD_EXAM: NORMAL

## 2022-07-13 NOTE — PROGRESS NOTES
Assessment/Plan  (H52.13) Myopia of both eyes  (primary encounter diagnosis)  Comment: Ocular health within normal limits.   Plan: REFRACTION [[id number]], HC CONTACT LENS FITTING COSMETIC LVL 1 (18807.011)        Discussed findings with patient. Glasses and contact lens Rx's updated. Patient should try new contacts for a few days before ordering. If she has problems with comfort, vision, or price, she can contact clinic to have alternative trial lenses dispensed or mailed to her. Plan on monitoring in 1-2 years with complete exam. Return to clinic sooner with any vision changes.     Contact Lens Billing  V-Code:  - Soft spherical  Final Contact Lens Rx       Brand Base Curve Diameter Sphere Cylinder    Right Lalit Precision 1  8.30 14.2 -1.75 Sphere    Left Lalit Precision 1  8.30 14.2 -2.25 Sphere    Expiration Date: 7/12/2024    Replacement: Daily         Price per Unit: $75 fitting fee    These are for cosmetic contact lenses.    Encounter Diagnosis   Name Primary?     Myopia of both eyes Yes        Complete documentation of historical and exam elements from today's encounter can  be found in the full encounter summary report (not reduplicated in this progress  note). I personally obtained the chief complaint(s) and history of present illness. I  confirmed and edited as necessary the review of systems, past medical/surgical  history, family history, social history, and examination findings as documented by  others; and I examined the patient myself. I personally reviewed the relevant tests,  images, and reports as documented above. I formulated and edited as necessary the  assessment and plan and discussed the findings and management plan with the  patient and family.    Felipe Turner, OD

## 2022-07-14 NOTE — TELEPHONE ENCOUNTER
DIAGNOSIS: Bilateral chronic knee pain /XR/Matt Callaway   APPOINTMENT DATE: 7.25.22   NOTES STATUS DETAILS   OFFICE NOTE from referring provider Internal 7.11.22 Dr Matt Callaway, [facility]   MEDICATION LIST Internal    XRAYS (IMAGES & REPORTS) Internal 7.11.22 B knee

## 2022-07-17 ENCOUNTER — HEALTH MAINTENANCE LETTER (OUTPATIENT)
Age: 52
End: 2022-07-17

## 2022-07-18 ENCOUNTER — HOSPITAL ENCOUNTER (OUTPATIENT)
Facility: AMBULATORY SURGERY CENTER | Age: 52
End: 2022-07-18
Attending: INTERNAL MEDICINE | Admitting: INTERNAL MEDICINE
Payer: COMMERCIAL

## 2022-07-18 ENCOUNTER — TELEPHONE (OUTPATIENT)
Dept: GASTROENTEROLOGY | Facility: CLINIC | Age: 52
End: 2022-07-18

## 2022-07-18 NOTE — TELEPHONE ENCOUNTER
Screening Questions    BlueKIND OF PREP RedLOCATION [review exclusion criteria] GreenSEDATION TYPE    1. Are you active on mychart? Y    2. What insurance is in the chart? BCBS     3.   Ordering/Referring Provider: GILLIAN    **(Sedation review/consideration needed)**  Do you have a legal guardian or Medical Power of    and/or are you able to give consent for your medical care?     Y    4. BMI   (If greater than 40 review exclusion criteria [PAC APPT IF [MAC] @ U)  21.3  [If yes, BMI OVER 40-EXTENDED PREP]    5. Have you had a positive covid test in the last 90 days?   N -     6.  Are you currently on dialysis?   N [ If yes, G-PREP & HOSPITAL setting ONLY]     7.  Do you have chronic kidney disease?  N [ If yes, G-PREP ]    8.   Do you have a diagnosis of diabetes?   N   [ If yes, G-PREP ]    9.  On a regular basis do you go 3-5 days between bowel movements?   N   [ If yes, EXTENDED PREP]    10.  Are you taking any prescription pain medications on a routine schedule?    N -  [ If yes, EXTENDED PREP] [If yes, MAC]      11.   Do you have any chemical dependencies such as alcohol, street drugs, or methadone?    N [If yes, MAC]    12.   Do you have any history of post-traumatic stress syndrome, severe anxiety or history of psychosis?    N  [If yes, MAC]    13.  [FEMALES] Are you currently pregnant? N    If yes, how many weeks?       Respiratory/Heart Screening:  [If yes to any of the following HOSPITAL setting only]     14. Do you use daily home oxygen?  N       15.  Do you have mod to severe Obstructive Sleep Apnea?         (OKAY @ [facility]  [facility]  SH  PH  RI  MG - if pt is not on OXYGEN)  N     16.  Do you have Pulmonary Hypertension [Lungs]?   N      17.  Do you have UNCONTROLLED asthma?  N     18.   Have you had a heart or lung transplant?   N      19.   In the past 6 months have you had a stroke or Transient ischemic attack (TIA - aka  mini stroke ) ?  (If yes, please review exclusion criteria)  N     20.    Have you had any heart related issues including cardiomyopathy or heart attack within 6 months?   N           If yes, did it require cardiac stenting or other implantable device?         21.   Do you have any implantable devices in your body (pacemaker, defib, LVAD)? (If yes, please review exclusion criteria)  N     22. Do you take nitroglycerin?   N           If yes, how often?   (if yes, HOSPITAL setting ONLY)    23.  Are you currently taking any blood thinners?    [If yes, INFORM patient to follw up w/ ORDERING PROVIDER FOR BRIDGING INSTRUCTIONS]     N    24.   Do you transfer independently?                (If NO, please HOSPITAL setting ONLY)  Y    25.   Preferred LOCAL Pharmacy for Pre Prescription:         [id number] - [place], [place] - [address] AT [facility] [address]  [facility] - [place], [place] - [address]   [id number] - [place], [place] - [address] AT [address] & [place]    Scheduling Details  (Please ask for phone number if not scheduled by patient)      Caller : Ceci Lopez    Date of Procedure: 10/31  Surgeon: GILLIAN  [place] [facility] [place]        Sedation Type: MAC (per order)  Conscious Sedation- Needs  for 6 hours after the procedure  MAC/General-Needs  for 24 hours after procedure    N :[Pre-op Required] at [facility]  SH  MG and OR for MAC sedation   (advise patient they will need a pre-op WITH IN 30 DAYS of procedure date)     Type of Procedure Scheduled:   Lower Endoscopy [Colonoscopy]    Which Colonoscopy Prep was Sent?:   MPREP     RAYMUNDO CF PATIENTS & GROEN'S PATIENTS NEEDS EXTENDED PREP       Informed patient they will need an adult  Y  Cannot take any type of public or medical transportation alone    Pre-Procedure Covid test to [facility] completed at [facility] or Externally: HOME TEST  **INFORMED OF HOME TESTING & LAB OPTION**        Confirmed Nurse will call to complete assessment  Y    Additional comments:

## 2022-07-19 ENCOUNTER — LAB (OUTPATIENT)
Dept: LAB | Facility: CLINIC | Age: 52
End: 2022-07-19
Payer: COMMERCIAL

## 2022-07-19 DIAGNOSIS — Z13.220 SCREENING CHOLESTEROL LEVEL: ICD-10-CM

## 2022-07-19 DIAGNOSIS — Z12.31 ENCOUNTER FOR SCREENING MAMMOGRAM FOR BREAST CANCER: ICD-10-CM

## 2022-07-19 LAB
CHOLEST SERPL-MCNC: 210 MG/DL
FASTING STATUS PATIENT QL REPORTED: YES
HDLC SERPL-MCNC: 89 MG/DL
LDLC SERPL CALC-MCNC: 94 MG/DL
NONHDLC SERPL-MCNC: 121 MG/DL
TRIGL SERPL-MCNC: 133 MG/DL

## 2022-07-19 PROCEDURE — 36415 COLL VENOUS BLD VENIPUNCTURE: CPT | Performed by: PATHOLOGY

## 2022-07-19 PROCEDURE — 80061 LIPID PANEL: CPT | Performed by: PATHOLOGY

## 2022-07-21 ENCOUNTER — ANCILLARY PROCEDURE (OUTPATIENT)
Dept: MAMMOGRAPHY | Facility: CLINIC | Age: 52
End: 2022-07-21
Attending: INTERNAL MEDICINE
Payer: COMMERCIAL

## 2022-07-21 DIAGNOSIS — Z12.31 ENCOUNTER FOR SCREENING MAMMOGRAM FOR BREAST CANCER: ICD-10-CM

## 2022-07-21 DIAGNOSIS — Z13.220 SCREENING CHOLESTEROL LEVEL: ICD-10-CM

## 2022-07-21 PROCEDURE — 77063 BREAST TOMOSYNTHESIS BI: CPT | Performed by: STUDENT IN AN ORGANIZED HEALTH CARE EDUCATION/TRAINING PROGRAM

## 2022-07-21 PROCEDURE — 77067 SCR MAMMO BI INCL CAD: CPT | Performed by: STUDENT IN AN ORGANIZED HEALTH CARE EDUCATION/TRAINING PROGRAM

## 2022-07-25 ENCOUNTER — OFFICE VISIT (OUTPATIENT)
Dept: ORTHOPEDICS | Facility: CLINIC | Age: 52
End: 2022-07-25
Attending: INTERNAL MEDICINE
Payer: COMMERCIAL

## 2022-07-25 ENCOUNTER — PRE VISIT (OUTPATIENT)
Dept: ORTHOPEDICS | Facility: CLINIC | Age: 52
End: 2022-07-25

## 2022-07-25 VITALS — BODY MASS INDEX: 22.32 KG/M2 | WEIGHT: 126 LBS | HEIGHT: 63 IN

## 2022-07-25 DIAGNOSIS — M21.42 PES PLANUS OF BOTH FEET: Primary | ICD-10-CM

## 2022-07-25 DIAGNOSIS — M25.561 BILATERAL CHRONIC KNEE PAIN: ICD-10-CM

## 2022-07-25 DIAGNOSIS — M22.2X1 PATELLOFEMORAL PAIN SYNDROME OF BOTH KNEES: ICD-10-CM

## 2022-07-25 DIAGNOSIS — M21.41 PES PLANUS OF BOTH FEET: Primary | ICD-10-CM

## 2022-07-25 DIAGNOSIS — M22.2X2 PATELLOFEMORAL PAIN SYNDROME OF BOTH KNEES: ICD-10-CM

## 2022-07-25 DIAGNOSIS — M25.562 BILATERAL CHRONIC KNEE PAIN: ICD-10-CM

## 2022-07-25 DIAGNOSIS — M17.10 PATELLOFEMORAL ARTHRITIS: ICD-10-CM

## 2022-07-25 DIAGNOSIS — G89.29 BILATERAL CHRONIC KNEE PAIN: ICD-10-CM

## 2022-07-25 PROCEDURE — 99203 OFFICE O/P NEW LOW 30 MIN: CPT | Performed by: FAMILY MEDICINE

## 2022-07-25 NOTE — LETTER
"  7/25/2022      RE: Ceci Lopez  [address] [place] [id number]     Dear Colleague,    Thank you for referring your patient, Ceci Lopez, to the [facility] [place]. Please see a copy of my visit note below.    Sports Medicine Clinic Visit    PCP: Matt Callaway    Ceci Lopez is a 51 year old female who is seen  in consultation at the request of Dr. Callaway presenting with bilateral knee pain    Injury: Chronic    Location of Pain: bilateral anterior knees  Duration of Pain: Chronic   Rating of Pain: 4/10  Pain is better with: Rest  Pain is worse with: Hiking, biking, running  Additional Features: locking  Treatment so far consists of: Previous PT 25 years ago, rest  Prior History of related problems: Same issue    Ht 1.6 m (5' 3\")   Wt 57.2 kg (126 lb)   BMI 22.32 kg/m       H/O bilateral knee pain, recurrent and intermittent for many years. Pt reports being unable to do much running as a young adult due to knee pain. Recently pain has gotten worse and will bother if walking long distances. Biking usually tolerated, but recently biking is causing some pain.  Patient does spin classes.  She is not in a fixed cleat.    X-rays of the bilateral knees consistent with mild to moderate patellofemoral DJD.    Patient is intolerant to nonsteroidal anti-inflammatories due to GI intolerance/colitis.      Images below reviewed by me:  6 views right and left knee radiographs 7/12/2022 7:43 AM     History: Bilateral chronic knee pain; Bilateral chronic knee pain;  Bilateral chronic knee pain     Comparison: None available     Findings:     AP view of bilateral knees, lateral and patellofemoral views of the  right and left knee were obtained.      Left: No acute osseous abnormality. Small joint effusion.     Osteophytes of the lateral and patellofemoral compartments. Preserved  medial and lateral compartments joint spaces, narrowing of the  patellofemoral joint.      Mild " patellar lateral subluxation.  Soft tissue is unremarkable.     Right:  No acute osseous abnormality. Small joint effusion.     Preservation of joint spaces.      Mild patellofemoral lateral subluxation.  Soft tissue is unremarkable.                                                                      Impression:  1. Left: Moderate grade patellofemoral osteoarthritic changes, mild  degenerative changes of the lateral compartment.  2. Right: Mild patellofemoral osteoarthritic changes.     JUTTA ELLERMANN, MD         PMH:  Active problem list:  Patient Active Problem List   Diagnosis     Crohn's disease (H)       FH:  No family history on file.    SH:  Social History     Socioeconomic History     Marital status:      Spouse name: Not on file     Number of children: Not on file     Years of education: Not on file     Highest education level: Not on file   Occupational History     Not on file   Tobacco Use     Smoking status: Never Smoker     Smokeless tobacco: Never Used   Substance and Sexual Activity     Alcohol use: Not on file     Drug use: Not on file     Sexual activity: Not on file   Other Topics Concern     Not on file   Social History Narrative     Not on file     Social Determinants of Health     Financial Resource Strain: Not on file   Food Insecurity: Not on file   Transportation Needs: Not on file   Physical Activity: Not on file   Stress: Not on file   Social Connections: Not on file   Intimate Partner Violence: Not on file   Housing Stability: Not on file       MEDS:  See EMR, reviewed  ALL:  See EMR, reviewed    REVIEW OF SYSTEMS:  CONSTITUTIONAL:NEGATIVE for fever, chills, change in weight  INTEGUMENTARY/SKIN: NEGATIVE for worrisome rashes, moles or lesions  EYES: NEGATIVE for vision changes or irritation  ENT/MOUTH: NEGATIVE for ear, mouth and throat problems  RESP:NEGATIVE for significant cough or SOB  BREAST: NEGATIVE for masses, tenderness or discharge  CV: NEGATIVE for chest pain,  palpitations or peripheral edema  GI: NEGATIVE for nausea, abdominal pain, heartburn, or change in bowel habits  :NEGATIVE for frequency, dysuria, or hematuria  :NEGATIVE for frequency, dysuria, or hematuria  NEURO: NEGATIVE for weakness, dizziness or paresthesias  ENDOCRINE: NEGATIVE for temperature intolerance, skin/hair changes  HEME/ALLERGY/IMMUNE: NEGATIVE for bleeding problems  PSYCHIATRIC: NEGATIVE for changes in mood or affect        Objective: The bilateral knees reveal no effusion.  I can flex and extend the knees fully.  She is mildly tender over the medial patellar facets.  Nontender over the lateral patellar facets.  Nontender over the medial or lateral joint line of either knee.  Normal range of motion of the bilateral hips.  Anterior posterior drawer is negative bilaterally.  Nontender over the patellar tendon or pes anserine bursa of either knee.  She has pes planus bilaterally that is mild.  Skin overlying the knees is normal.  Appropriate conversation and affect.    We reviewed x-rays of her knee that show patellofemoral DJD changes bilaterally, mild on the right, moderate on the left.    Assessment patellofemoral syndrome of the bilateral knees.  Patellofemoral DJD, mild to moderate    Plan: We discussed conservative care for patellofemoral syndrome and patellofemoral DJD including over-the-counter pain medicines and their side effects.  She avoids nonsteroidal anti-inflammatories because of her colitis.  She uses Tylenol.  We discussed a Cho-Pat strap and pull-up knee sleeve.  We discussed cortisone injections and Synvisc injections.  She understands Synvisc would need to be qualified by insurance.  She would like to avoid injections.  We discussed physical therapy protocols that focus on patellofemoral alignment and kneecap taping.  Patient would like to see physical therapy.  We discussed indications for knee surgery such as patellofemoral arthroplasty.  Patient would like to avoid knee  surgery.  We discussed her workout program.  I think it safe for her to continue with biking at this time.  She would like a referral for custom orthotics to be made by an orthotist.  Referral to PT and orthotics placed.      Again, thank you for allowing me to participate in the care of your patient.      Sincerely,    Cristian Oconnor MD

## 2022-07-25 NOTE — PROGRESS NOTES
"Sports Medicine Clinic Visit    PCP: Matt Callaway    Ceci Lopez is a 51 year old female who is seen  in consultation at the request of Dr. Callaway presenting with bilateral knee pain    Injury: Chronic    Location of Pain: bilateral anterior knees  Duration of Pain: Chronic   Rating of Pain: 4/10  Pain is better with: Rest  Pain is worse with: Hiking, biking, running  Additional Features: locking  Treatment so far consists of: Previous PT 25 years ago, rest  Prior History of related problems: Same issue    Ht 1.6 m (5' 3\")   Wt 57.2 kg (126 lb)   BMI 22.32 kg/m       H/O bilateral knee pain, recurrent and intermittent for many years. Pt reports being unable to do much running as a young adult due to knee pain. Recently pain has gotten worse and will bother if walking long distances. Biking usually tolerated, but recently biking is causing some pain.  Patient does spin classes.  She is not in a fixed cleat.    X-rays of the bilateral knees consistent with mild to moderate patellofemoral DJD.    Patient is intolerant to nonsteroidal anti-inflammatories due to GI intolerance/colitis.      Images below reviewed by me:  6 views right and left knee radiographs 7/12/2022 7:43 AM     History: Bilateral chronic knee pain; Bilateral chronic knee pain;  Bilateral chronic knee pain     Comparison: None available     Findings:     AP view of bilateral knees, lateral and patellofemoral views of the  right and left knee were obtained.      Left: No acute osseous abnormality. Small joint effusion.     Osteophytes of the lateral and patellofemoral compartments. Preserved  medial and lateral compartments joint spaces, narrowing of the  patellofemoral joint.      Mild patellar lateral subluxation.  Soft tissue is unremarkable.     Right:  No acute osseous abnormality. Small joint effusion.     Preservation of joint spaces.      Mild patellofemoral lateral subluxation.  Soft tissue is unremarkable.                           "                                            Impression:  1. Left: Moderate grade patellofemoral osteoarthritic changes, mild  degenerative changes of the lateral compartment.  2. Right: Mild patellofemoral osteoarthritic changes.     JUTTA ELLERMANN, MD         PMH:  Active problem list:  Patient Active Problem List   Diagnosis     Crohn's disease (H)       FH:  No family history on file.    SH:  Social History     Socioeconomic History     Marital status:      Spouse name: Not on file     Number of children: Not on file     Years of education: Not on file     Highest education level: Not on file   Occupational History     Not on file   Tobacco Use     Smoking status: Never Smoker     Smokeless tobacco: Never Used   Substance and Sexual Activity     Alcohol use: Not on file     Drug use: Not on file     Sexual activity: Not on file   Other Topics Concern     Not on file   Social History Narrative     Not on file     Social Determinants of Health     Financial Resource Strain: Not on file   Food Insecurity: Not on file   Transportation Needs: Not on file   Physical Activity: Not on file   Stress: Not on file   Social Connections: Not on file   Intimate Partner Violence: Not on file   Housing Stability: Not on file       MEDS:  See EMR, reviewed  ALL:  See EMR, reviewed    REVIEW OF SYSTEMS:  CONSTITUTIONAL:NEGATIVE for fever, chills, change in weight  INTEGUMENTARY/SKIN: NEGATIVE for worrisome rashes, moles or lesions  EYES: NEGATIVE for vision changes or irritation  ENT/MOUTH: NEGATIVE for ear, mouth and throat problems  RESP:NEGATIVE for significant cough or SOB  BREAST: NEGATIVE for masses, tenderness or discharge  CV: NEGATIVE for chest pain, palpitations or peripheral edema  GI: NEGATIVE for nausea, abdominal pain, heartburn, or change in bowel habits  :NEGATIVE for frequency, dysuria, or hematuria  :NEGATIVE for frequency, dysuria, or hematuria  NEURO: NEGATIVE for weakness, dizziness or  paresthesias  ENDOCRINE: NEGATIVE for temperature intolerance, skin/hair changes  HEME/ALLERGY/IMMUNE: NEGATIVE for bleeding problems  PSYCHIATRIC: NEGATIVE for changes in mood or affect        Objective: The bilateral knees reveal no effusion.  I can flex and extend the knees fully.  She is mildly tender over the medial patellar facets.  Nontender over the lateral patellar facets.  Nontender over the medial or lateral joint line of either knee.  Normal range of motion of the bilateral hips.  Anterior posterior drawer is negative bilaterally.  Nontender over the patellar tendon or pes anserine bursa of either knee.  She has pes planus bilaterally that is mild.  Skin overlying the knees is normal.  Appropriate conversation and affect.    We reviewed x-rays of her knee that show patellofemoral DJD changes bilaterally, mild on the right, moderate on the left.    Assessment patellofemoral syndrome of the bilateral knees.  Patellofemoral DJD, mild to moderate    Plan: We discussed conservative care for patellofemoral syndrome and patellofemoral DJD including over-the-counter pain medicines and their side effects.  She avoids nonsteroidal anti-inflammatories because of her colitis.  She uses Tylenol.  We discussed a Cho-Pat strap and pull-up knee sleeve.  We discussed cortisone injections and Synvisc injections.  She understands Synvisc would need to be qualified by insurance.  She would like to avoid injections.  We discussed physical therapy protocols that focus on patellofemoral alignment and kneecap taping.  Patient would like to see physical therapy.  We discussed indications for knee surgery such as patellofemoral arthroplasty.  Patient would like to avoid knee surgery.  We discussed her workout program.  I think it safe for her to continue with biking at this time.  She would like a referral for custom orthotics to be made by an orthotist.  Referral to PT and orthotics placed.

## 2022-07-28 NOTE — PROGRESS NOTES
Physical Therapy Initial Examination/Evaluation  July 29, 2022    Key PT Findings:   1) Patellar maltracking due to femoral IR  2) Hip abduction on R weaker than L   3) overall, great mechanics with movements     Ceci Lopez is a 51 year old female referred to physical therapy by Dr. Cristian Oconnor MD for treatment of chronic bilateral knee pain with Precautions/Restrictions/MD instructions  bilateral knee patellofemoral mild to mod djd, h/o bilateral ptfs    Therapist Impression: Ceci Lopez presents to therapy with bilateral knee pain. Currently demonstrates impairments with hip strength, and patellar maltracking. Due to these impairments, Ceci Lopez is unable to ambulate desired distance, bike without pain, and go down stairs reciprocally.      Subjective:  Presenting Complaint Bilateral knee pain   Mechanism of Injury  unknown- on going issue (recent onset after long hike while on vacation)   DOI (onset)/ DOS Last few years    Functional Limitations Stairs, squats, hiking    Notable PMH See Epic chart    Prior treatment PT  fair   Prior Imaging  x-ray- mild to mod PFJ DJD       Pain/Presentation    Pain Level   Rest: 0/10  ; Activity: 3/10   Location   patellar tendon and lateral patellar facet bilaterally    Frequency Intermittent   Described as aching and dull ; crunchy    Alleviated by  Rest   Progression of Sx Unchanged   Time of Day  Activity related   Sleeping  No issues/uninterrupted       Social Factors/Lifestyle  Occupation and Duties [employer] at [employer]   prolonged sitting, keyboarding/computer use   Barriers at home/work None as reported by patient   Medications See chart   Current HEP/Exercise Strength training at gym, a lot of walking, biking to work and on weekends    Patient Reported Health excellent     Patient Goals:  1) able to walk without knee discomfort  2) no pain with biking   3) strength training without issue    Other factors/PMH that may impact care: na         Patient  Health History  Ceci Lopez being seen for Long-term knee pain.       Problem occurred: It's ongoing, and I'd like to do what I can to minimize it   Pain is reported as 2/10 on pain scale.  General health as reported by patient is excellent.  Pertinent medical history includes: other. Other medical history details: colitis/crohn's.     Medical allergies: none.   Surgeries include:  None.    Current medications:  Other. Other medications details: stelara and mesalamine for crohn's.    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   Primary job tasks include:  Computer work and prolonged standing.                                    KNEE EVALUATION    Static Posture  Minor medial arch pronation bilaterally; bilateral femoral IR with knee genu valgum      Dynamic Movement Screen  Single leg stance observations: No significant findings for eyes open/closed  Double limb squat observations: Good technique/no significant findings  Single limb squat observations: Knee valgus    Gait: No significant findings    Range of Motion  Hip Joint Screen: Negative      Knee ROM Extension Flexion   Left 2 139   Right 2 141        Hip and Knee Strength                       MMT Left Right   Hip Extension 3+/5 3+/5   Hip Abduction 4/5 4/5   Hip IR 4/5 4/5   Hip ER 4/5 4/5      Knee MMT Quadriceps set Straight Leg Raise   Left Good Good   Right Good Good     Knee ligaments and meniscus: negative    Patellofemoral assessment: Lateral patellar tilt bilateral, Patella karyn bilateral and Lateral patellar facet TTP greater on R than left  -Responded very well to medial tilt and medial glide Bowman taping; significant decrease in pain with bilateral tape technique for squatting and stair steps.     Palpation  Left: No tenderness to palpation  Right: Mild tenderness to palpation at lateral patella; no tenderness over patellar tendon (only with loading)    System    Physical Exam    General     ROS    Assessment/Plan:    Patient is a  51 year old female with both sides knee complaints.    Patient has the following significant findings with corresponding treatment plan.                Diagnosis 1:  Chronic bilateral knee pain  Pain -  hot/cold therapy, electric stimulation, mechanical traction and manual therapy  Decreased joint mobility - manual therapy and therapeutic exercise  Decreased strength - therapeutic exercise and therapeutic activities  Impaired muscle performance - neuro re-education  Decreased function - therapeutic activities  Impaired posture - neuro re-education    Therapy Evaluation Codes:     1) Clinical presentation characteristics are:   Stable/Uncomplicated.  2) Decision-Making    Low complexity using standardized patient assessment instrument and/or measureable assessment of functional outcome.  Cumulative Therapy Evaluation is: Low complexity.    Previous and current functional limitations:  (See Goal Flow Sheet for this information)    Short term and Long term goals: (See Goal Flow Sheet for this information)     Communication ability:  Patient appears to be able to clearly communicate and understand verbal and written communication and follow directions correctly.  Treatment Explanation - The following has been discussed with the patient:   RX ordered/plan of care  Anticipated outcomes  Possible risks and side effects  This patient would benefit from PT intervention to resume normal activities.   Rehab potential is excellent.    Frequency:  1 X week, once daily  Duration:  for 4 weeks then every other week for 4 weeks  Discharge Plan:  Achieve all LTG.  Independent in home treatment program.  Reach maximal therapeutic benefit.    Please refer to the daily flowsheet for treatment today, total treatment time and time spent performing 1:1 timed codes.

## 2022-07-29 ENCOUNTER — THERAPY VISIT (OUTPATIENT)
Dept: PHYSICAL THERAPY | Facility: CLINIC | Age: 52
End: 2022-07-29
Attending: FAMILY MEDICINE
Payer: COMMERCIAL

## 2022-07-29 DIAGNOSIS — M25.562 BILATERAL CHRONIC KNEE PAIN: ICD-10-CM

## 2022-07-29 DIAGNOSIS — M25.561 BILATERAL CHRONIC KNEE PAIN: ICD-10-CM

## 2022-07-29 DIAGNOSIS — M22.2X1 PATELLOFEMORAL PAIN SYNDROME OF BOTH KNEES: ICD-10-CM

## 2022-07-29 DIAGNOSIS — M22.2X2 PATELLOFEMORAL PAIN SYNDROME OF BOTH KNEES: ICD-10-CM

## 2022-07-29 DIAGNOSIS — G89.29 BILATERAL CHRONIC KNEE PAIN: ICD-10-CM

## 2022-07-29 DIAGNOSIS — M21.41 PES PLANUS OF BOTH FEET: ICD-10-CM

## 2022-07-29 DIAGNOSIS — M17.10 PATELLOFEMORAL ARTHRITIS: ICD-10-CM

## 2022-07-29 DIAGNOSIS — M21.42 PES PLANUS OF BOTH FEET: ICD-10-CM

## 2022-07-29 PROCEDURE — 97161 PT EVAL LOW COMPLEX 20 MIN: CPT | Mod: GP

## 2022-07-29 PROCEDURE — 97110 THERAPEUTIC EXERCISES: CPT | Mod: GP

## 2022-07-29 ASSESSMENT — ACTIVITIES OF DAILY LIVING (ADL)
STAND: ACTIVITY IS NOT DIFFICULT
GIVING WAY, BUCKLING OR SHIFTING OF KNEE: I DO NOT HAVE THE SYMPTOM
WALK: ACTIVITY IS MINIMALLY DIFFICULT
LIMPING: I DO NOT HAVE THE SYMPTOM
KNEE_ACTIVITY_OF_DAILY_LIVING_SUM: 61
HOW_WOULD_YOU_RATE_THE_OVERALL_FUNCTION_OF_YOUR_KNEE_DURING_YOUR_USUAL_DAILY_ACTIVITIES?: NEARLY NORMAL
RISE FROM A CHAIR: ACTIVITY IS NOT DIFFICULT
AS_A_RESULT_OF_YOUR_KNEE_INJURY,_HOW_WOULD_YOU_RATE_YOUR_CURRENT_LEVEL_OF_DAILY_ACTIVITY?: NORMAL
PAIN: THE SYMPTOM AFFECTS MY ACTIVITY MODERATELY
GO DOWN STAIRS: ACTIVITY IS MINIMALLY DIFFICULT
SWELLING: I DO NOT HAVE THE SYMPTOM
KNEE_ACTIVITY_OF_DAILY_LIVING_SCORE: 87.14
SIT WITH YOUR KNEE BENT: ACTIVITY IS NOT DIFFICULT
SQUAT: ACTIVITY IS SOMEWHAT DIFFICULT
STIFFNESS: I DO NOT HAVE THE SYMPTOM
WEAKNESS: I DO NOT HAVE THE SYMPTOM
GO UP STAIRS: ACTIVITY IS NOT DIFFICULT
HOW_WOULD_YOU_RATE_THE_CURRENT_FUNCTION_OF_YOUR_KNEE_DURING_YOUR_USUAL_DAILY_ACTIVITIES_ON_A_SCALE_FROM_0_TO_100_WITH_100_BEING_YOUR_LEVEL_OF_KNEE_FUNCTION_PRIOR_TO_YOUR_INJURY_AND_0_BEING_THE_INABILITY_TO_PERFORM_ANY_OF_YOUR_USUAL_DAILY_ACTIVITIES?: 90
RAW_SCORE: 61
KNEEL ON THE FRONT OF YOUR KNEE: ACTIVITY IS SOMEWHAT DIFFICULT

## 2022-08-17 ENCOUNTER — THERAPY VISIT (OUTPATIENT)
Dept: PHYSICAL THERAPY | Facility: CLINIC | Age: 52
End: 2022-08-17
Payer: COMMERCIAL

## 2022-08-17 DIAGNOSIS — G89.29 CHRONIC PAIN OF BOTH KNEES: ICD-10-CM

## 2022-08-17 DIAGNOSIS — M25.561 CHRONIC PAIN OF BOTH KNEES: ICD-10-CM

## 2022-08-17 DIAGNOSIS — M25.562 CHRONIC PAIN OF BOTH KNEES: ICD-10-CM

## 2022-08-17 PROCEDURE — 97110 THERAPEUTIC EXERCISES: CPT | Mod: 59 | Performed by: PHYSICAL THERAPIST

## 2022-08-17 PROCEDURE — 97530 THERAPEUTIC ACTIVITIES: CPT | Mod: GP | Performed by: PHYSICAL THERAPIST

## 2022-09-24 ENCOUNTER — HEALTH MAINTENANCE LETTER (OUTPATIENT)
Age: 52
End: 2022-09-24

## 2022-10-19 ENCOUNTER — TELEPHONE (OUTPATIENT)
Dept: GASTROENTEROLOGY | Facility: CLINIC | Age: 52
End: 2022-10-19

## 2022-10-19 NOTE — PROGRESS NOTES
[facility] Dermatology Note  Encounter Date: Oct 20, 2022  Office Visit     Dermatology Problem List:  1. PMH Crohn's on Stelara  ____________________________________________    Assessment & Plan:    # Benign lesions: Multiple benign nevi, solar lentigos, seborrheic keratoses, cherry angiomas. Explained to patient benign nature of lesion. No treatment is necessary at this time unless the lesion changes or becomes symptomatic.   - ABCDs of melanoma were discussed and self skin checks were advised.  - Sun precaution was advised including the use of sun screens of SPF 30 or higher, sun protective clothing, and avoidance of tanning beds.     Procedures Performed:   None    Follow-up: 1 year(s) in-person, or earlier for new or changing lesions    Staff and Scribe:     Scribe Disclosure:  I, Collin Avendaño, am serving as a scribe to document services personally performed by Donovan Jay MD based on data collection and the provider's statements to me.     Provider Disclosure:   The documentation recorded by the scribe accurately reflects the services I personally performed and the decisions made by me.    Donovan Jay MD    Department of Dermatology  [facility]: Phone: [phone], Fax:[phone]  [facility]: Phone: [phone] Fax: [phone]  ____________________________________________    CC: Skin Check (Ceci is here for a skin check and has no spots of concern.)    HPI:  Ms. Ceci Lopez is a(n) 51 year old female who presents today as a new patient for FBSE. Patient does not report any spots of concern. For Crohn's, patient was on prednisone briefly then mesalamine for 4 years, then Stelara. No personal or family history of skin cancer. History of tanning bed use twice. Works indoors. Does not wear sunscreen regularly.    Patient is otherwise feeling  well, without additional skin concerns.    Labs Reviewed:  N/A    Physical Exam:  Vitals: There were no vitals taken for this visit.  SKIN: Full skin, which includes the head/face, both arms, chest, back, abdomen,both legs, genitalia and/or groin buttocks, digits and/or nails, was examined.  - Well healed scar on right upper back at prior nevi excision site.  - There are dome shaped bright red papules on the trunk and extremities.   - Multiple regular brown pigmented macules and papules are identified on the trunk and extremities.   - Scattered brown macules on sun exposed areas.  - There are waxy stuck on tan to brown papules on the trunk and extremities.   - No other lesions of concern on areas examined.     Medications:  Current Outpatient Medications   Medication     calcium citrate and vitamin D (CITRACAL) 200-250 MG-UNIT TABS per tablet     mesalamine (APRISO ER) 0.375 g 24 hr capsule     ustekinumab (STELARA) 90 MG/ML     Vitamin D3 (CHOLECALCIFEROL) 25 mcg (1000 units) tablet     No current facility-administered medications for this visit.      Past Medical History:   Patient Active Problem List   Diagnosis     Crohn's disease (H)     Chronic pain of both knees     No past medical history on file.     CC Ruperto Cain MD  [address] [place] [address] on close of this encounter.

## 2022-10-19 NOTE — TELEPHONE ENCOUNTER
Patient rescheduled with alber Aguilar for call back if patient would like to reschedule with Ant. Referral ordered by Ant.

## 2022-10-19 NOTE — TELEPHONE ENCOUNTER
Caller: Ceci Lopez      Procedure: Colon    Date, Location, and Surgeon of Procedure C[place] [facility] [place]    Ordering Provider:Ruperto Cain MD     Reason for cancel (please be detailed, any staff messages or encounters to note?): Reschedule with Dr. Cain      Rescheduled: Y     If rescheduled:    Date: 01/09/2023   [place] [facility] [place] Resent: Yes (changes to prep?)   Covid Test Rescheduled: no   Note any change or update to original order/sedation:

## 2022-10-19 NOTE — PROGRESS NOTES
HPI:    Last visit with me 7/11/2022 and additional details in that note. Overall doing quite well. She still has B knee pain. She was seen by Dr. Oconnor, orthopedics 7/25/2022. She still continues to exercise. No other HEENT, cardiopulmonary, abdominal, , GYN, neurological, systemic, psychiatric, lymphatic, endocrine, vascular complaints.       No past medical history on file.  Past Surgical History:   Procedure Laterality Date     COLONOSCOPY N/A 10/18/2021    Procedure: COLONOSCOPY, WITH BIOPSY;  Surgeon: Ruperto Cain MD;  Location: [facility] OR     PE:    Vitals noted, gen, nad, cooperative, alert, neck supple, nl rom, lungs with good air movement, RRR, S1, S2, no MRG, abdomen, no acute findings. Grossly normal neurological exam. B knees w/o swelling, no tenderness, no erythema.       A/P:     1.  Chron's Disease;  Off  mesalamine and she remains on  ustekinumab. Follows closely with Dr. Cain. Last visit 4/27/22, next visit 12/29/22. Colonoscopy done through Dr. Cain on 10/18/2021. She states she was scheduled for repeat colonoscopy 10/31/2022 but his had to be changed.   2.  Mammogram; done 7/21/2022.   3. Vaccinations; Prevnar 20 done 7/11/2022 due to pt taking ustekinumab. COVID Pfizer x 4. Tdap 12/9/2019.   4.  Skin check;   Appointment with dermatology 10/20/22 today with Dr. Jay  5.  Family hx of high cholesterol; Future fasting lipid panel ordered 7/19/2022 TG's 133, HDL 89 and LDL 94.   6.  Bilateral knee pain; X rays  7/11/22. She was seen 7/25/2022 orthopedics by Dr. Oconnor. For now PT and orthotics. She continues to run and do exercises and ride a stationary bike.       Impression:  1. Left: Moderate grade patellofemoral osteoarthritic changes, mild  degenerative changes of the lateral compartment.  2. Right: Mild patellofemoral osteoarthritic changes      30 minutes spent on the date of the encounter doing chart review, history and exam, documentation and further activities as noted  above exclusive of procedures and other billable interpretations

## 2022-10-19 NOTE — TELEPHONE ENCOUNTER
Caller: Ceci Lopez      Procedure: COLONOSCOPY    Date, Location, and Surgeon of Procedure Cancelled: 10/31, GILLIAN ABBASI    Ordering Provider:GILLIAN    Reason for cancel (please be detailed, any staff messages or encounters to note?): PT SCHEDULING CONFLICT        Rescheduled: Y     If rescheduled:    Date: 12/8   [place] [facility] [place] Resent: RESENT(changes to prep?)   Covid Test Rescheduled: HOME TEST   Note any change or update to original order/sedation: N/A

## 2022-10-20 ENCOUNTER — OFFICE VISIT (OUTPATIENT)
Dept: INTERNAL MEDICINE | Facility: CLINIC | Age: 52
End: 2022-10-20
Payer: COMMERCIAL

## 2022-10-20 ENCOUNTER — OFFICE VISIT (OUTPATIENT)
Dept: DERMATOLOGY | Facility: CLINIC | Age: 52
End: 2022-10-20
Payer: COMMERCIAL

## 2022-10-20 VITALS
WEIGHT: 125.5 LBS | SYSTOLIC BLOOD PRESSURE: 112 MMHG | BODY MASS INDEX: 22.23 KG/M2 | OXYGEN SATURATION: 100 % | DIASTOLIC BLOOD PRESSURE: 74 MMHG | HEART RATE: 73 BPM

## 2022-10-20 DIAGNOSIS — R35.0 URINARY FREQUENCY: Primary | ICD-10-CM

## 2022-10-20 DIAGNOSIS — L81.4 SOLAR LENTIGO: ICD-10-CM

## 2022-10-20 DIAGNOSIS — L82.1 SEBORRHEIC KERATOSIS: ICD-10-CM

## 2022-10-20 DIAGNOSIS — K50.80 CROHN'S DISEASE OF BOTH SMALL AND LARGE INTESTINE WITHOUT COMPLICATION (H): ICD-10-CM

## 2022-10-20 DIAGNOSIS — D18.01 CHERRY ANGIOMA: ICD-10-CM

## 2022-10-20 DIAGNOSIS — D22.9 MULTIPLE BENIGN NEVI: Primary | ICD-10-CM

## 2022-10-20 PROCEDURE — 99214 OFFICE O/P EST MOD 30 MIN: CPT | Performed by: INTERNAL MEDICINE

## 2022-10-20 PROCEDURE — 99203 OFFICE O/P NEW LOW 30 MIN: CPT | Performed by: DERMATOLOGY

## 2022-10-20 ASSESSMENT — PAIN SCALES - GENERAL: PAINLEVEL: NO PAIN (0)

## 2022-10-20 NOTE — NURSING NOTE
Ceci Lopez is a 51 year old female patient that presents today in clinic for the following:    Chief Complaint   Patient presents with     RECHECK     Frequent urination     The patient's allergies and medications were reviewed as noted. A set of vitals were recorded as noted without incident. The patient does not have any other questions for the provider.    Juan Gore, EMT at 1:53 PM on 10/20/2022

## 2022-10-20 NOTE — PATIENT INSTRUCTIONS
Patient Education     Checking for Skin Cancer  You can find cancer early by checking your skin each month. There are 3 kinds of skin cancer. They are melanoma, basal cell carcinoma, and squamous cell carcinoma. Doing monthly skin checks is the best way to find new marks or skin changes. Follow the instructions below for checking your skin.   The ABCDEs of checking moles for melanoma   Check your moles or growths for signs of melanoma using ABCDE:   Asymmetry: the sides of the mole or growth don t match  Border: the edges are ragged, notched, or blurred  Color: the color within the mole or growth varies  Diameter: the mole or growth is larger than 6 mm (size of a pencil eraser)  Evolving: the size, shape, or color of the mole or growth is changing (evolving is not shown in the images below)    Checking for other types of skin cancer  Basal cell carcinoma or squamous cell carcinoma have symptoms such as:     A spot or mole that looks different from all other marks on your skin  Changes in how an area feels, such as itching, tenderness, or pain  Changes in the skin's surface, such as oozing, bleeding, or scaliness  A sore that does not heal  New swelling or redness beyond the border of a mole    Who s at risk?  Anyone can get skin cancer. But you are at greater risk if you have:   Fair skin, light-colored hair, or light-colored eyes  Many moles or abnormal moles on your skin  A history of sunburns from sunlight or tanning beds  A family history of skin cancer  A history of exposure to radiation or chemicals  A weakened immune system  If you have had skin cancer in the past, you are at risk for recurring skin cancer.   How to check your skin  Do your monthly skin checkups in front of a full-length mirror. Check all parts of your body, including your:   Head (ears, face, neck, and scalp)  Torso (front, back, and sides)  Arms (tops, undersides, upper, and lower armpits)  Hands (palms, backs, and fingers, including  under the nails)  Buttocks and genitals  Legs (front, back, and sides)  Feet (tops, soles, toes, including under the nails, and between toes)  If you have a lot of moles, take digital photos of them each month. Make sure to take photos both up close and from a distance. These can help you see if any moles change over time.   Most skin changes are not cancer. But if you see any changes in your skin, call your doctor right away. Only he or she can diagnose a problem. If you have skin cancer, seeing your doctor can be the first step toward getting the treatment that could save your life.   [employer] last reviewed this educational content on 4/1/2019 2000-2020 [employer]. [address] [place] [address], [place] [address]. All rights reserved. This information is not intended as a substitute for professional medical care. Always follow your healthcare professional's instructions.       When should I call my doctor?  If you are worsening or not improving, please, contact us or seek urgent care as noted below.     Who should I call with questions (adults)?  [facility] (adult and pediatric): [phone]  [facility] (adult): [phone]  For urgent needs outside of business hours call the [facility] at [phone] and ask for the dermatology resident on call to be paged  If this is a medical emergency and you are unable to reach an ER, Call [phone]    Who should I call with questions (pediatric)?  [facility]- Pediatric Dermatology  Dr. Zeina Treadwell, Dr. Michelle Gallardo, Dr. Rhianna Melchor, SHAKIRA Clemente, Dr. Raquel Cardona, Dr. Marilee Roach & Dr. Matt Beaver  Non-urgent nurse triage line; [phone]- Mariya and Carly GARCÍA Care Coordinatorpoonam Maguire (/Complex ) [phone]    If you need a prescription refill, please contact your pharmacy. Refills are approved or denied by our  Physicians during normal business hours, Monday through Fridays  Per office policy, refills will not be granted if you have not been seen within the past year (or sooner depending on your child's condition)    Scheduling Information:  Pediatric Appointment Scheduling and Call Center [phone]  Radiology Scheduling- [phone]  Sedation Unit Scheduling- [phone]  [place] Scheduling- General [phone]; Pediatric Dermatology [phone]  Main  Services: [phone]  Mongolian: [phone]  Spanish: [phone]  Hmong/Taiwanese/Lithuanian: [phone]  Preadmission Nursing Department Fax Number: [phone] (Fax all pre-operative paperwork to this number)    For urgent matters arising during evenings, weekends, or holidays that cannot wait for normal business hours please call [phone] and ask for the dermatology resident on call to be paged.

## 2022-10-20 NOTE — LETTER
10/20/2022       RE: Ceci Lopez  [address] [place] [id number]     Dear Colleague,    Thank you for referring your patient, Ceci Lopez, to the [facility]ERMATOLOG[facility]. Please see a copy of my visit note below.    [facility] Dermatology Note  Encounter Date: Oct 20, 2022  Office Visit     Dermatology Problem List:  1. PMH Crohn's on Stelara  ____________________________________________    Assessment & Plan:    # Benign lesions: Multiple benign nevi, solar lentigos, seborrheic keratoses, cherry angiomas. Explained to patient benign nature of lesion. No treatment is necessary at this time unless the lesion changes or becomes symptomatic.   - ABCDs of melanoma were discussed and self skin checks were advised.  - Sun precaution was advised including the use of sun screens of SPF 30 or higher, sun protective clothing, and avoidance of tanning beds.     Procedures Performed:   None    Follow-up: 1 year(s) in-person, or earlier for new or changing lesions    Staff and Scribe:     Scribe Disclosure:  I, Collin Avendaño, am serving as a scribe to document services personally performed by Donovan Jay MD based on data collection and the provider's statements to me.     Provider Disclosure:   The documentation recorded by the scribe accurately reflects the services I personally performed and the decisions made by me.    Donovan Jay MD    Department of Dermatology  [facility]: Phone: [phone], Fax:[phone]  [facility]: Phone: [phone] Fax: [phone]  ____________________________________________    CC: Skin Check (Ceci is here for a skin check and has no spots of concern.)    HPI:  Ms. Ceci Lopez is a(n) 51 year old female who presents today as a new  patient for FBSE. Patient does not report any spots of concern. For Crohn's, patient was on prednisone briefly then mesalamine for 4 years, then Stelara. No personal or family history of skin cancer. History of tanning bed use twice. Works indoors. Does not wear sunscreen regularly.    Patient is otherwise feeling well, without additional skin concerns.    Labs Reviewed:  N/A    Physical Exam:  Vitals: There were no vitals taken for this visit.  SKIN: Full skin, which includes the head/face, both arms, chest, back, abdomen,both legs, genitalia and/or groin buttocks, digits and/or nails, was examined.  - Well healed scar on right upper back at prior nevi excision site.  - There are dome shaped bright red papules on the trunk and extremities.   - Multiple regular brown pigmented macules and papules are identified on the trunk and extremities.   - Scattered brown macules on sun exposed areas.  - There are waxy stuck on tan to brown papules on the trunk and extremities.   - No other lesions of concern on areas examined.     Medications:  Current Outpatient Medications   Medication     calcium citrate and vitamin D (CITRACAL) 200-250 MG-UNIT TABS per tablet     mesalamine (APRISO ER) 0.375 g 24 hr capsule     ustekinumab (STELARA) 90 MG/ML     Vitamin D3 (CHOLECALCIFEROL) 25 mcg (1000 units) tablet     No current facility-administered medications for this visit.      Past Medical History:   Patient Active Problem List   Diagnosis     Crohn's disease (H)     Chronic pain of both knees     No past medical history on file.     CC Ruperto Cain MD  [address] [place] [address] on close of this encounter.

## 2022-10-20 NOTE — NURSING NOTE
Dermatology Rooming Note    Ceci Lopez's goals for this visit include:   Chief Complaint   Patient presents with     Skin Check     Ceci is here for a skin check and has no spots of concern.     Wilber Disla, EMT

## 2022-10-25 NOTE — TELEPHONE ENCOUNTER
MEDICAL RECORDS REQUEST   [facility]  Urology Clinic  [address] [place] [address]  PHONE: [phone]  Fax: [phone]        FUTURE VISIT INFORMATION                                                   Ceci EUGENIO Lopez, : 1970 scheduled for future visit at [facility]    APPOINTMENT INFORMATION:    Date: 12/15/2022     Provider:  Rosalie Maki MD    Reason for Visit/Diagnosis: incontinence    REFERRAL INFORMATION:    Referring provider:  Matt Callaway MD in [facility]    RECORDS REQUESTED FOR VISIT                                                     NOTES  STATUS/DETAILS   OFFICE NOTE from referring provider  yes, 10/20/2022 -- Matt Callaway MD in [facility]   MEDICATION LIST  yes   LABS     URINALYSIS (UA)  yes   IMAGING (IMAGES & REPORT)  no     PRE-VISIT CHECKLIST      Record collection complete Yes   Appointment appropriately scheduled           (right time/right provider) Yes   Joint diagnostic appointment coordinated correctly          (ensure right order & amount of time) Yes   MyChart activation Yes   Questionnaire complete If no, please explain pending

## 2022-11-09 DIAGNOSIS — K50.00 CROHN'S DISEASE OF SMALL INTESTINE WITHOUT COMPLICATION (H): Primary | ICD-10-CM

## 2022-11-21 DIAGNOSIS — K50.80 CROHN'S DISEASE OF BOTH SMALL AND LARGE INTESTINE WITHOUT COMPLICATION (H): ICD-10-CM

## 2022-11-29 NOTE — TELEPHONE ENCOUNTER
ustekinumab (STELARA) 90 MG/ML 1 mL 4 2/7/2022         Last Written Prescription Date:  2-7-2022  Last Fill Quantity: 1ML,   # refills: 4  Last Office Visit : 4-  Future Office visit:  4-    Lab Results   Component Value Date    WBC 6.3 05/04/2022     Lab Results   Component Value Date    RBC 4.14 05/04/2022     Lab Results   Component Value Date    HGB 12.6 05/04/2022     Lab Results   Component Value Date    HCT 39.3 05/04/2022     No components found for: MCT  Lab Results   Component Value Date    MCV 95 05/04/2022     Lab Results   Component Value Date    MCH 30.4 05/04/2022     Lab Results   Component Value Date    MCHC 32.1 05/04/2022     Lab Results   Component Value Date    RDW 12.6 05/04/2022     Lab Results   Component Value Date     05/04/2022     CRP   Date Value Ref Range Status   05/04/2022 <0.1 0.0 - 0.8 mg/dL Final     Erythrocyte Sedimentation Rate   Date Value Ref Range Status   05/04/2022 13 0 - 20 mm/hr Final         Routing refill request to provider for review/approval because:  6 MONTHS SINCE LABS DONE AND NO APPOINTMENT UNTIL APRIL 2023.

## 2022-12-01 RX ORDER — USTEKINUMAB 90 MG/ML
INJECTION, SOLUTION SUBCUTANEOUS
Qty: 1 ML | Refills: 4 | Status: SHIPPED | OUTPATIENT
Start: 2022-12-01 | End: 2023-09-27

## 2022-12-06 ENCOUNTER — LAB (OUTPATIENT)
Dept: LAB | Facility: CLINIC | Age: 52
End: 2022-12-06
Payer: COMMERCIAL

## 2022-12-06 DIAGNOSIS — K50.80 CROHN'S DISEASE OF BOTH SMALL AND LARGE INTESTINE WITHOUT COMPLICATION (H): ICD-10-CM

## 2022-12-06 LAB
ALBUMIN SERPL BCG-MCNC: 4.2 G/DL (ref 3.5–5.2)
ALP SERPL-CCNC: 67 U/L (ref 35–104)
ALT SERPL W P-5'-P-CCNC: 30 U/L (ref 10–35)
AST SERPL W P-5'-P-CCNC: 33 U/L (ref 10–35)
BASOPHILS # BLD AUTO: 0 10E3/UL (ref 0–0.2)
BASOPHILS NFR BLD AUTO: 0 %
BILIRUB DIRECT SERPL-MCNC: <0.2 MG/DL (ref 0–0.3)
BILIRUB SERPL-MCNC: 0.2 MG/DL
CRP SERPL-MCNC: <3 MG/L
EOSINOPHIL # BLD AUTO: 0 10E3/UL (ref 0–0.7)
EOSINOPHIL NFR BLD AUTO: 0 %
ERYTHROCYTE [DISTWIDTH] IN BLOOD BY AUTOMATED COUNT: 12.5 % (ref 10–15)
ERYTHROCYTE [SEDIMENTATION RATE] IN BLOOD BY WESTERGREN METHOD: 10 MM/HR (ref 0–20)
HCT VFR BLD AUTO: 36.4 % (ref 35–47)
HGB BLD-MCNC: 12 G/DL (ref 11.7–15.7)
IMM GRANULOCYTES # BLD: 0 10E3/UL
IMM GRANULOCYTES NFR BLD: 0 %
LYMPHOCYTES # BLD AUTO: 1.6 10E3/UL (ref 0.8–5.3)
LYMPHOCYTES NFR BLD AUTO: 20 %
MCH RBC QN AUTO: 31.1 PG (ref 26.5–33)
MCHC RBC AUTO-ENTMCNC: 33 G/DL (ref 31.5–36.5)
MCV RBC AUTO: 94 FL (ref 78–100)
MONOCYTES # BLD AUTO: 0.3 10E3/UL (ref 0–1.3)
MONOCYTES NFR BLD AUTO: 4 %
NEUTROPHILS # BLD AUTO: 6.1 10E3/UL (ref 1.6–8.3)
NEUTROPHILS NFR BLD AUTO: 76 %
PLATELET # BLD AUTO: 251 10E3/UL (ref 150–450)
PROT SERPL-MCNC: 7.1 G/DL (ref 6.4–8.3)
RBC # BLD AUTO: 3.86 10E6/UL (ref 3.8–5.2)
WBC # BLD AUTO: 8.1 10E3/UL (ref 4–11)

## 2022-12-06 PROCEDURE — 86140 C-REACTIVE PROTEIN: CPT

## 2022-12-06 PROCEDURE — 36415 COLL VENOUS BLD VENIPUNCTURE: CPT

## 2022-12-06 PROCEDURE — 85652 RBC SED RATE AUTOMATED: CPT

## 2022-12-06 PROCEDURE — 80076 HEPATIC FUNCTION PANEL: CPT

## 2022-12-06 PROCEDURE — 85025 COMPLETE CBC W/AUTO DIFF WBC: CPT

## 2022-12-13 ENCOUNTER — PRE VISIT (OUTPATIENT)
Dept: UROLOGY | Facility: CLINIC | Age: 52
End: 2022-12-13

## 2022-12-14 NOTE — TELEPHONE ENCOUNTER
Reason for visit: Incontinence     Relevant information: consult    Records/imaging/labs/orders: referred by Dr. Callaway    Pt called: no need for a call    At Rooming: collect a urine/pvr      Lottie Dasilva CMA  12/13/2022  8:28 PM

## 2022-12-15 ENCOUNTER — PRE VISIT (OUTPATIENT)
Dept: UROLOGY | Facility: CLINIC | Age: 52
End: 2022-12-15

## 2022-12-15 ENCOUNTER — OFFICE VISIT (OUTPATIENT)
Dept: UROLOGY | Facility: CLINIC | Age: 52
End: 2022-12-15
Payer: COMMERCIAL

## 2022-12-15 VITALS
HEART RATE: 63 BPM | SYSTOLIC BLOOD PRESSURE: 115 MMHG | BODY MASS INDEX: 21.62 KG/M2 | DIASTOLIC BLOOD PRESSURE: 68 MMHG | WEIGHT: 122 LBS | HEIGHT: 63 IN

## 2022-12-15 DIAGNOSIS — N32.81 URGENCY-FREQUENCY SYNDROME: ICD-10-CM

## 2022-12-15 DIAGNOSIS — M62.89 PELVIC FLOOR DYSFUNCTION: ICD-10-CM

## 2022-12-15 DIAGNOSIS — N39.41 URGENCY INCONTINENCE: Primary | ICD-10-CM

## 2022-12-15 DIAGNOSIS — M79.18 MYALGIA OF PELVIC FLOOR: ICD-10-CM

## 2022-12-15 PROCEDURE — 99204 OFFICE O/P NEW MOD 45 MIN: CPT | Mod: 25 | Performed by: UROLOGY

## 2022-12-15 PROCEDURE — 51798 US URINE CAPACITY MEASURE: CPT | Performed by: UROLOGY

## 2022-12-15 RX ORDER — MIRABEGRON 25 MG/1
25 TABLET, FILM COATED, EXTENDED RELEASE ORAL DAILY
Qty: 30 TABLET | Refills: 3 | Status: SHIPPED | OUTPATIENT
Start: 2022-12-15 | End: 2023-04-27

## 2022-12-15 ASSESSMENT — PAIN SCALES - GENERAL: PAINLEVEL: NO PAIN (0)

## 2022-12-15 NOTE — PROGRESS NOTES
December 15, 2022    Referring Provider: Matt Callaway MD  [address] [facility] [address] [place] [address]    Primary Care Provider: Matt Callaway    Assessment & Plan     Urgency-frequency syndrome/Urgency incontinence  We discussed that urge incontinence treatments include observation, weight loss, medications most commonly anticholinergics, physical therapy, biofeedback, intravesical botulinum toxin, percutaneous tibial nerve stimulation and sacral neuromodulation.   Based on the exam findings recommend PT, but she is also bothered enough by her symptoms to also try medication.  She wishes to try beta 3 agonist.    - Physical Therapy Referral; Future  - mirabegron (MYRBETRIQ) 25 MG 24 hr tablet; Take 1 tablet (25 mg) by mouth daily    Myalgia of pelvic floor/Pelvic floor dysfunction  We discussed how her pelvic floor symptoms are related to the physical exam findings and her pelvic floor myofascial dysfunction.  We discussed how the recommended treatment is dedicated pelvic floor therapy.  We discussed how the pelvic floor physical therapy works and patient is agreeable.  Referral was placed.      - Physical Therapy Referral; Future    Return in about 6 months (around 6/15/2023) for in person.    15 minutes were spent on this day of the encounter in reviewing the EMR including, direct patient care including prescription medications, coordination of care and documentation    Rosalie Maki MD MPH  (she/her/hers)   of Urology  [facility]    HPI:  Ceci Lopez is a 51 year old female who presents for evaluation of her pelvic floor symptoms.  She has a long history of urinary urgency frequency and urgency incontinence.  She recalls that she remembers talking to someone about this in her 20s.      Denies gross hematuria, UTI, abnormal vaginal bleeding, vaginal bulge, constipation, dyspareunia.      2 adopted kids    No pregnacies, pelvic surgery     and works  "with non-profits    Denies any family history of  disease or malignancy    Denies history of abuse.  Reports safe at home    No past medical history on file.    Past Surgical History:   Procedure Laterality Date     COLONOSCOPY N/A 10/18/2021    Procedure: COLONOSCOPY, WITH BIOPSY;  Surgeon: Ruperto Cain MD;  [place] [facility] [place]     Social History     Socioeconomic History     Marital status:      Spouse name: Not on file     Number of children: Not on file     Years of education: Not on file     Highest education level: Not on file   Occupational History     Not on file   Tobacco Use     Smoking status: Never     Smokeless tobacco: Never   Substance and Sexual Activity     Alcohol use: Not on file     Drug use: Not on file     Sexual activity: Not on file   Other Topics Concern     Not on file   Social History Narrative     Not on file     Social Determinants of Health     Financial Resource Strain: Not on file   Food Insecurity: Not on file   Transportation Needs: Not on file   Physical Activity: Not on file   Stress: Not on file   Social Connections: Not on file   Intimate Partner Violence: Not on file   Housing Stability: Not on file       Family History   Problem Relation Age of Onset     Skin Cancer No family hx of      Melanoma No family hx of      ROS    Allergies   Allergen Reactions     Ibuprofen Other (See Comments)     Causes colitis flare ups.  Patient does not want to take this medication.     Erythromycin      PN: LW Reaction: Vomiting     Current Outpatient Medications   Medication     calcium citrate and vitamin D (CITRACAL) 200-250 MG-UNIT TABS per tablet     ustekinumab (STELARA) 90 MG/ML     Vitamin D3 (CHOLECALCIFEROL) 25 mcg (1000 units) tablet     No current facility-administered medications for this visit.   /68   Pulse 63   Ht 1.6 m (5' 3\")   Wt 55.3 kg (122 lb)   LMP 12/14/2022 (Exact Date)   BMI 21.61 kg/m    GENERAL: healthy, alert and no distress  EYES: " Eyes grossly normal to inspection, conjunctivae and sclerae normal  HENT: normal cephalic/atraumatic.  External ears, nose and mouth without ulcers or lesions.  RESP: no audible wheeze, cough, or visible cyanosis.  No visible retractions or increased work of breathing.  Able to speak fully in complete sentences.  NEURO: Cranial nerves grossly intact, mentation intact and speech normal  PSYCH: mentation appears normal, affect normal/bright, judgement and insight intact, normal speech and appearance well-groomed  ABD: soft, non-tender, non-distended  : Normal external genitalia.  Pelvic exam is remarkable for diffuse myofascial tenderness    PVR 6 mL by bladder scan      CC  Patient Care Team:  Laurie Patel MD as PCP - General (Internal Medicine)  Ruperto Cain MD as MD (Gastroenterology)  Ruperto Cain MD as Assigned Gastroenterology Provider  Sanjuanita Constantino RP as Pharmacist (Pharmacist Ambulatory Care)  Laurie Patel MD as Assigned PCP  Cristian Oconnor MD as Assigned Musculoskeletal Provider  Sanjuanita Constantino [facility] as Assigned [place] Pharmacist  Rosalie Maki MD as MD (Urology)  Donovan Jay MD as Assigned Surgical Provider  Seema Barrientos RN as Specialty Care Coordinator (Gastroenterology)  LAURIE PATEL

## 2022-12-15 NOTE — LETTER
12/15/2022       RE: Ceci Lopez  [address] [facility] [address]     Dear Colleague,    Thank you for referring your patient, Ceci Lopez, to the [facility] UROLOGY CLINIC [place] at [facility]. Please see a copy of my visit note below.    December 15, 2022    Referring Provider: Matt Callaway MD  [address] [address]    Primary Care Provider: Matt Callaway    Assessment & Plan     Urgency-frequency syndrome/Urgency incontinence  We discussed that urge incontinence treatments include observation, weight loss, medications most commonly anticholinergics, physical therapy, biofeedback, intravesical botulinum toxin, percutaneous tibial nerve stimulation and sacral neuromodulation.   Based on the exam findings recommend PT, but she is also bothered enough by her symptoms to also try medication.  She wishes to try beta 3 agonist.    - Physical Therapy Referral; Future  - mirabegron (MYRBETRIQ) 25 MG 24 hr tablet; Take 1 tablet (25 mg) by mouth daily    Myalgia of pelvic floor/Pelvic floor dysfunction  We discussed how her pelvic floor symptoms are related to the physical exam findings and her pelvic floor myofascial dysfunction.  We discussed how the recommended treatment is dedicated pelvic floor therapy.  We discussed how the pelvic floor physical therapy works and patient is agreeable.  Referral was placed.      - Physical Therapy Referral; Future    Return in about 6 months (around 6/15/2023) for in person.    15 minutes were spent on this day of the encounter in reviewing the EMR including, direct patient care including prescription medications, coordination of care and documentation    Rosalie Maki MD MPH  (she/her/hers)   of Urology  [facility]    HPI:  Ceci Lopez is a 51 year old female who presents for evaluation of her pelvic floor symptoms.  She has a long history of  urinary urgency frequency and urgency incontinence.  She recalls that she remembers talking to someone about this in her 20s.      Denies gross hematuria, UTI, abnormal vaginal bleeding, vaginal bulge, constipation, dyspareunia.      2 adopted kids    No pregnacies, pelvic surgery     and works with non-profits    Denies any family history of  disease or malignancy    Denies history of abuse.  Reports safe at home    No past medical history on file.    Past Surgical History:   Procedure Laterality Date     COLONOSCOPY N/A 10/18/2021    Procedure: COLONOSCOPY, WITH BIOPSY;  Surgeon: Ruperto Cain MD;  [place] [facility] [place]     Social History     Socioeconomic History     Marital status:      Spouse name: Not on file     Number of children: Not on file     Years of education: Not on file     Highest education level: Not on file   Occupational History     Not on file   Tobacco Use     Smoking status: Never     Smokeless tobacco: Never   Substance and Sexual Activity     Alcohol use: Not on file     Drug use: Not on file     Sexual activity: Not on file   Other Topics Concern     Not on file   Social History Narrative     Not on file     Social Determinants of Health     Financial Resource Strain: Not on file   Food Insecurity: Not on file   Transportation Needs: Not on file   Physical Activity: Not on file   Stress: Not on file   Social Connections: Not on file   Intimate Partner Violence: Not on file   Housing Stability: Not on file       Family History   Problem Relation Age of Onset     Skin Cancer No family hx of      Melanoma No family hx of      ROS    Allergies   Allergen Reactions     Ibuprofen Other (See Comments)     Causes colitis flare ups.  Patient does not want to take this medication.     Erythromycin      PN: LW Reaction: Vomiting     Current Outpatient Medications   Medication     calcium citrate and vitamin D (CITRACAL) 200-250 MG-UNIT TABS per tablet     ustekinumab  "(STELARA) 90 MG/ML     Vitamin D3 (CHOLECALCIFEROL) 25 mcg (1000 units) tablet     No current facility-administered medications for this visit.   /68   Pulse 63   Ht 1.6 m (5' 3\")   Wt 55.3 kg (122 lb)   LMP 12/14/2022 (Exact Date)   BMI 21.61 kg/m    GENERAL: healthy, alert and no distress  EYES: Eyes grossly normal to inspection, conjunctivae and sclerae normal  HENT: normal cephalic/atraumatic.  External ears, nose and mouth without ulcers or lesions.  RESP: no audible wheeze, cough, or visible cyanosis.  No visible retractions or increased work of breathing.  Able to speak fully in complete sentences.  NEURO: Cranial nerves grossly intact, mentation intact and speech normal  PSYCH: mentation appears normal, affect normal/bright, judgement and insight intact, normal speech and appearance well-groomed  ABD: soft, non-tender, non-distended  : Normal external genitalia.  Pelvic exam is remarkable for diffuse myofascial tenderness    PVR 6 mL by bladder scan      CC  Patient Care Team:  Laurie Patel MD as PCP - General (Internal Medicine)  Ruperto Cain MD as MD (Gastroenterology)  Ruperto Cain MD as Assigned Gastroenterology Provider  Sanjuanita Constantino RPH as Pharmacist (Pharmacist Ambulatory Care)  Laurie Patel MD as Assigned PCP  Cristian Oconnor MD as Assigned Musculoskeletal Provider  Sanjuanita Constantino RP as Assigned MTM Pharmacist  Rosalie Maki MD as MD (Urology)  Donovan Jay MD as Assigned Surgical Provider  Seema Barrientos RN as Specialty Care Coordinator (Gastroenterology)  LAURIE PATEL      "

## 2022-12-15 NOTE — H&P (VIEW-ONLY)
December 15, 2022    Referring Provider: Matt Callaway MD  [address] [facility] [address] [place] [address]    Primary Care Provider: Matt Callaway    Assessment & Plan     Urgency-frequency syndrome/Urgency incontinence  We discussed that urge incontinence treatments include observation, weight loss, medications most commonly anticholinergics, physical therapy, biofeedback, intravesical botulinum toxin, percutaneous tibial nerve stimulation and sacral neuromodulation.   Based on the exam findings recommend PT, but she is also bothered enough by her symptoms to also try medication.  She wishes to try beta 3 agonist.    - Physical Therapy Referral; Future  - mirabegron (MYRBETRIQ) 25 MG 24 hr tablet; Take 1 tablet (25 mg) by mouth daily    Myalgia of pelvic floor/Pelvic floor dysfunction  We discussed how her pelvic floor symptoms are related to the physical exam findings and her pelvic floor myofascial dysfunction.  We discussed how the recommended treatment is dedicated pelvic floor therapy.  We discussed how the pelvic floor physical therapy works and patient is agreeable.  Referral was placed.      - Physical Therapy Referral; Future    Return in about 6 months (around 6/15/2023) for in person.    15 minutes were spent on this day of the encounter in reviewing the EMR including, direct patient care including prescription medications, coordination of care and documentation    Rosalie Maki MD MPH  (she/her/hers)   of Urology  [facility]    HPI:  Ceci Lopez is a 51 year old female who presents for evaluation of her pelvic floor symptoms.  She has a long history of urinary urgency frequency and urgency incontinence.  She recalls that she remembers talking to someone about this in her 20s.      Denies gross hematuria, UTI, abnormal vaginal bleeding, vaginal bulge, constipation, dyspareunia.      2 adopted kids    No pregnacies, pelvic surgery     and works  "with non-profits    Denies any family history of  disease or malignancy    Denies history of abuse.  Reports safe at home    No past medical history on file.    Past Surgical History:   Procedure Laterality Date     COLONOSCOPY N/A 10/18/2021    Procedure: COLONOSCOPY, WITH BIOPSY;  Surgeon: Ruperto Cain MD;  Location: [facility] OR     Social History     Socioeconomic History     Marital status:      Spouse name: Not on file     Number of children: Not on file     Years of education: Not on file     Highest education level: Not on file   Occupational History     Not on file   Tobacco Use     Smoking status: Never     Smokeless tobacco: Never   Substance and Sexual Activity     Alcohol use: Not on file     Drug use: Not on file     Sexual activity: Not on file   Other Topics Concern     Not on file   Social History Narrative     Not on file     Social Determinants of Health     Financial Resource Strain: Not on file   Food Insecurity: Not on file   Transportation Needs: Not on file   Physical Activity: Not on file   Stress: Not on file   Social Connections: Not on file   Intimate Partner Violence: Not on file   Housing Stability: Not on file       Family History   Problem Relation Age of Onset     Skin Cancer No family hx of      Melanoma No family hx of      ROS    Allergies   Allergen Reactions     Ibuprofen Other (See Comments)     Causes colitis flare ups.  Patient does not want to take this medication.     Erythromycin      PN: LW Reaction: Vomiting     Current Outpatient Medications   Medication     calcium citrate and vitamin D (CITRACAL) 200-250 MG-UNIT TABS per tablet     ustekinumab (STELARA) 90 MG/ML     Vitamin D3 (CHOLECALCIFEROL) 25 mcg (1000 units) tablet     No current facility-administered medications for this visit.   /68   Pulse 63   Ht 1.6 m (5' 3\")   Wt 55.3 kg (122 lb)   LMP 12/14/2022 (Exact Date)   BMI 21.61 kg/m    GENERAL: healthy, alert and no distress  EYES: " Eyes grossly normal to inspection, conjunctivae and sclerae normal  HENT: normal cephalic/atraumatic.  External ears, nose and mouth without ulcers or lesions.  RESP: no audible wheeze, cough, or visible cyanosis.  No visible retractions or increased work of breathing.  Able to speak fully in complete sentences.  NEURO: Cranial nerves grossly intact, mentation intact and speech normal  PSYCH: mentation appears normal, affect normal/bright, judgement and insight intact, normal speech and appearance well-groomed  ABD: soft, non-tender, non-distended  : Normal external genitalia.  Pelvic exam is remarkable for diffuse myofascial tenderness    PVR 6 mL by bladder scan      CC  Patient Care Team:  Laurie Patel MD as PCP - General (Internal Medicine)  Ruperto Cain MD as MD (Gastroenterology)  Ruperto Cain MD as Assigned Gastroenterology Provider  Sanjuanita Constantino RP as Pharmacist (Pharmacist Ambulatory Care)  Laurie Patel MD as Assigned PCP  Cristian Oconnor MD as Assigned Musculoskeletal Provider  Sanjuanita Constantino [facility] as Assigned [place] Pharmacist  Rosalie Maki MD as MD (Urology)  Donovan Jay MD as Assigned Surgical Provider  Seema Barrientos RN as Specialty Care Coordinator (Gastroenterology)  LAURIE PATEL

## 2022-12-15 NOTE — NURSING NOTE
"Chief Complaint   Patient presents with     Consult For     Urgency for years       Blood pressure 115/68, pulse 63, height 1.6 m (5' 3\"), weight 55.3 kg (122 lb), last menstrual period 12/14/2022. Body mass index is 21.61 kg/m .    Patient Active Problem List   Diagnosis     Crohn's disease (H)     Chronic pain of both knees       Allergies   Allergen Reactions     Ibuprofen Other (See Comments)     Causes colitis flare ups.  Patient does not want to take this medication.     Erythromycin      PN: LW Reaction: Vomiting       Current Outpatient Medications   Medication Sig Dispense Refill     calcium citrate and vitamin D (CITRACAL) 200-250 MG-UNIT TABS per tablet Take 2 tablets by mouth daily       ustekinumab (STELARA) 90 MG/ML INJECT THE CONTENTS OF 1 SYRINGE UNDER THE SKIN EVERY 8 WEEKS 1 mL 4     Vitamin D3 (CHOLECALCIFEROL) 25 mcg (1000 units) tablet Take 25 mcg by mouth daily         Social History     Tobacco Use     Smoking status: Never     Smokeless tobacco: Never       Lavinia Hawkins  12/15/2022  10:30 AM  "

## 2022-12-22 ENCOUNTER — TELEPHONE (OUTPATIENT)
Dept: GASTROENTEROLOGY | Facility: CLINIC | Age: 52
End: 2022-12-22

## 2022-12-22 DIAGNOSIS — K50.80 CROHN'S DISEASE OF BOTH SMALL AND LARGE INTESTINE WITHOUT COMPLICATION (H): Primary | ICD-10-CM

## 2022-12-22 RX ORDER — BISACODYL 5 MG/1
TABLET, DELAYED RELEASE ORAL
Qty: 4 TABLET | Refills: 0 | Status: SHIPPED | OUTPATIENT
Start: 2022-12-22 | End: 2023-02-06

## 2022-12-22 NOTE — TELEPHONE ENCOUNTER
Attempted to contact patient regarding upcoming colonoscopy  procedure on 1.9.23 for pre assessment questions. No answer.     Left message to return call to [phone] #[phone]    Discuss Covid policy.     Pre op exam scheduled: N/A    Arrival time: 1330    [facility]; [address] [place] [address], [address] Floor, [place] [address]    Sedation type: MAC    Anticoagulants: No    Electronic implanted devices? No    Diabetic? No    Indication for procedure: Crohn's disease of both small and large intestine without complication     Bowel prep recommendation: Standard Golytely     Prep instructions sent via [employer]. Bowel prep script sent to    [facility] #[id number] - [place], [place] - [address] AT [facility] OF [address]      Lori Cedillo RN

## 2022-12-23 RX ORDER — ONDANSETRON 4 MG/1
TABLET, ORALLY DISINTEGRATING ORAL
Qty: 3 TABLET | Refills: 0 | Status: SHIPPED | OUTPATIENT
Start: 2022-12-23 | End: 2023-02-06

## 2022-12-23 NOTE — TELEPHONE ENCOUNTER
Pt returned call. Pre assessment questions completed for upcoming colonoscopy  procedure scheduled on 1/9/23    COVID policy reviewed.     Reviewed procedural arrival time 1330 and facility location [facility]; [address] [facility], [address], [place] [address]    Designated  policy reviewed. Instructed to have someone stay 24 hours post procedure.     Bowel prep recommendation: Standard Golytely d/t magnesium citrate. Pt has strong aversion towards Golytely prep and would like to do Miralax prep. Informed pt of mag citrate recall. Instructed pt she can start Golytely prep early and space out doses to decrease nausea. Also offered to send Zofran. Pt would like Zofran Rx sent to pharmacy. Pt would like us to reach out to Dr. Cain to see if she can complete the Miralax prep without magnesium citrate. Sent message to Dr. Cain and his nurse Seema to inquire about prep. Pt verbalized understanding that if Dr. Cain does not approve Miralax prep without mag citrate then she will proceed with Golytely as prescribed.    Reviewed procedure prep instructions.     Zofran Rx has been sent to   [facility] #[id number] - [place], [place] - [address] AT [facility] [address].     Patient verbalized understanding and had no questions or concerns at this time.    JAVIER LEONG RN

## 2022-12-23 NOTE — TELEPHONE ENCOUNTER
Ruperto Cain MD  P Endoscopy Pre Assessment Nurse Pool; Seema Barrientos, RAQUEL  If she wants to do miralax/gatorade prep I would like her to do 1 miralax/gatorade prep the night before and then another dose the morning of procedure (when she would normally have taken the mag citrate).     Thanks!   J     Called pt to inform her of option of Standard Golytely vs Double Split-Dose Miralax prep. Pt requests detailed instructions be sent to her on [employer] regarding Double Split-Dose Miralax prep and she will review and decide which prep she will use. Educated pt verbally this Double Split-Dose Miralax prep would include:  - Two (2) Dulcolax (Bisacodyl) tablets   - Two (2) 8.3 ounce bottles of Miralax   - Two (2) 64 ounce of Gatorade (not red or purple)  - Total of 128oz    Pt verbalized understanding.    Sent Double Split-dose Miralax prep instructions via [employer].     Catie Spencer, RAQUEL  Endoscopy Pre-Assessment RN

## 2022-12-27 PROBLEM — M25.561 CHRONIC PAIN OF BOTH KNEES: Status: RESOLVED | Noted: 2022-08-17 | Resolved: 2022-12-27

## 2022-12-27 PROBLEM — M25.562 CHRONIC PAIN OF BOTH KNEES: Status: RESOLVED | Noted: 2022-08-17 | Resolved: 2022-12-27

## 2022-12-27 PROBLEM — G89.29 CHRONIC PAIN OF BOTH KNEES: Status: RESOLVED | Noted: 2022-08-17 | Resolved: 2022-12-27

## 2022-12-27 NOTE — PROGRESS NOTES
Discharge Note    Progress reporting period is from July 29, 2022 to Aug 17, 2022.     Ceci failed to return for next follow up visit and current status is unknown.  Please see information below for last relevant information on current status.  Patient seen for Rxs Used: 2 visits.  SUBJECTIVE  Subjective changes noted by patient:  Subjective: pt reports the tape did not end up helping. she also is not sure if her knees will get better, but is hopeful to try PT. is quite active with working out. pain can be with most things like walking, biking ,exercise classes. sometimes skips lunges during her workout since those hurt. has to modify in yoga as well. pain can be random at time. both knees. pain is either antior/below knee cap, can be lateral as well. downhill and down stairs is worse than ascending.  .  Current pain level is Current Pain level: 0/10.     Previous pain level was  Initial Pain level: 3/10.   Changes in function:  Yes (See Goal flowsheet attached for changes in current functional level)  Adverse reaction to treatment or activity: None    OBJECTIVE  Changes noted in objective findings: Objective: pain with ascend and descend step at deep anterior knee. trial rep knee ext in sitting: decr pain on stair after. rep knee ext in standing: incr confidence on stairs after and decr pain. sustained knee ext in prone: B after.     ASSESSMENT/PLAN  Diagnosis: bilateral knee pain   Updated problem list and treatment plan:   Pain - HEP  Decreased function - HEP  Decreased strength - HEP  STG/LTGs have been met or progress has been made towards goals:  Yes, please see goal flowsheet for most current information  Assessment of Progress: current status is unknown.  Last current status: Assessment of progress: Pt has not made progress   Self Management Plans:  HEP  I have re-evaluated this patient and find that the nature, scope, duration and intensity of the therapy is appropriate for the medical condition of the  patient.  Ceci continues to require the following intervention to meet STG and LTG's:  HEP.    Recommendations:  Discharge with current home program.  Patient to follow up with MD as needed.    Please refer to the daily flowsheet for treatment today, total treatment time and time spent performing 1:1 timed codes.

## 2023-01-02 ENCOUNTER — TELEPHONE (OUTPATIENT)
Dept: GASTROENTEROLOGY | Facility: CLINIC | Age: 53
End: 2023-01-02

## 2023-01-02 NOTE — TELEPHONE ENCOUNTER
PA Initiation    Medication: Stelara intitiated  Insurance Company: [employer] ([facility]) - Phone [phone] Fax [phone]  Pharmacy Filling the Rx:    Filling Pharmacy Phone:    Filling Pharmacy Fax:    Start Date: 1/2/2023    [id number]

## 2023-01-02 NOTE — TELEPHONE ENCOUNTER
PA Initiation    Medication: Stelara intitiated  Insurance Company: [employer] ([facility]) - Phone [phone] Fax [phone]  Pharmacy Filling the Rx:    Filling Pharmacy Phone:    Filling Pharmacy Fax:    Start Date: 1/2/2023    [id number]

## 2023-01-03 NOTE — TELEPHONE ENCOUNTER
I left a message for the patient to try and get signed up for a copay card. [employer]      Prior Authorization Approval    Authorization Effective Date: 11/26/2021  Authorization Expiration Date: 3/22/2023  Medication: Stelara intitiated  Approved Dose/Quantity: ud  Reference #: [id number]   Insurance Company: [employer] ([facility]) - Phone [phone] Fax [phone]  Expected CoPay:       CoPay Card Available:      Foundation Assistance Needed:    Which Pharmacy is filling the prescription (Not needed for infusion/clinic administered):    Pharmacy Notified:    Patient Notified:

## 2023-01-09 ENCOUNTER — ANESTHESIA EVENT (OUTPATIENT)
Dept: SURGERY | Facility: AMBULATORY SURGERY CENTER | Age: 53
End: 2023-01-09
Payer: COMMERCIAL

## 2023-01-09 ENCOUNTER — HOSPITAL ENCOUNTER (OUTPATIENT)
Facility: AMBULATORY SURGERY CENTER | Age: 53
Discharge: HOME | End: 2023-01-09
Attending: INTERNAL MEDICINE | Admitting: INTERNAL MEDICINE
Payer: COMMERCIAL

## 2023-01-09 ENCOUNTER — ANESTHESIA (OUTPATIENT)
Dept: SURGERY | Facility: AMBULATORY SURGERY CENTER | Age: 53
End: 2023-01-09
Payer: COMMERCIAL

## 2023-01-09 VITALS
SYSTOLIC BLOOD PRESSURE: 108 MMHG | HEIGHT: 63 IN | RESPIRATION RATE: 16 BRPM | TEMPERATURE: 97.6 F | DIASTOLIC BLOOD PRESSURE: 67 MMHG | OXYGEN SATURATION: 100 % | BODY MASS INDEX: 22.15 KG/M2 | WEIGHT: 125 LBS | HEART RATE: 60 BPM

## 2023-01-09 VITALS — HEART RATE: 57 BPM

## 2023-01-09 LAB
COLONOSCOPY: NORMAL
HCG UR QL: NEGATIVE
INTERNAL QC OK POCT: NORMAL
POCT KIT EXPIRATION DATE: NORMAL
POCT KIT LOT NUMBER: NORMAL

## 2023-01-09 PROCEDURE — 45378 DIAGNOSTIC COLONOSCOPY: CPT

## 2023-01-09 PROCEDURE — 81025 URINE PREGNANCY TEST: CPT | Performed by: PATHOLOGY

## 2023-01-09 RX ORDER — NALOXONE HYDROCHLORIDE 0.4 MG/ML
0.4 INJECTION, SOLUTION INTRAMUSCULAR; INTRAVENOUS; SUBCUTANEOUS
Status: CANCELLED | OUTPATIENT
Start: 2023-01-09

## 2023-01-09 RX ORDER — PROCHLORPERAZINE MALEATE 10 MG
10 TABLET ORAL EVERY 6 HOURS PRN
Status: CANCELLED | OUTPATIENT
Start: 2023-01-09

## 2023-01-09 RX ORDER — SODIUM CHLORIDE, SODIUM LACTATE, POTASSIUM CHLORIDE, CALCIUM CHLORIDE 600; 310; 30; 20 MG/100ML; MG/100ML; MG/100ML; MG/100ML
INJECTION, SOLUTION INTRAVENOUS CONTINUOUS
Status: DISCONTINUED | OUTPATIENT
Start: 2023-01-09 | End: 2023-01-10 | Stop reason: HOSPADM

## 2023-01-09 RX ORDER — PROPOFOL 10 MG/ML
INJECTION, EMULSION INTRAVENOUS CONTINUOUS PRN
Status: DISCONTINUED | OUTPATIENT
Start: 2023-01-09 | End: 2023-01-09

## 2023-01-09 RX ORDER — LIDOCAINE 40 MG/G
CREAM TOPICAL
Status: DISCONTINUED | OUTPATIENT
Start: 2023-01-09 | End: 2023-01-10 | Stop reason: HOSPADM

## 2023-01-09 RX ORDER — ONDANSETRON 2 MG/ML
4 INJECTION INTRAMUSCULAR; INTRAVENOUS EVERY 6 HOURS PRN
Status: CANCELLED | OUTPATIENT
Start: 2023-01-09

## 2023-01-09 RX ORDER — NALOXONE HYDROCHLORIDE 0.4 MG/ML
0.2 INJECTION, SOLUTION INTRAMUSCULAR; INTRAVENOUS; SUBCUTANEOUS
Status: CANCELLED | OUTPATIENT
Start: 2023-01-09

## 2023-01-09 RX ORDER — LIDOCAINE HYDROCHLORIDE 20 MG/ML
INJECTION, SOLUTION INFILTRATION; PERINEURAL PRN
Status: DISCONTINUED | OUTPATIENT
Start: 2023-01-09 | End: 2023-01-09

## 2023-01-09 RX ORDER — ONDANSETRON 2 MG/ML
4 INJECTION INTRAMUSCULAR; INTRAVENOUS
Status: DISCONTINUED | OUTPATIENT
Start: 2023-01-09 | End: 2023-01-10 | Stop reason: HOSPADM

## 2023-01-09 RX ORDER — FLUMAZENIL 0.1 MG/ML
0.2 INJECTION, SOLUTION INTRAVENOUS
Status: CANCELLED | OUTPATIENT
Start: 2023-01-09 | End: 2023-01-10

## 2023-01-09 RX ORDER — ONDANSETRON 4 MG/1
4 TABLET, ORALLY DISINTEGRATING ORAL EVERY 6 HOURS PRN
Status: CANCELLED | OUTPATIENT
Start: 2023-01-09

## 2023-01-09 RX ADMIN — PROPOFOL 200 MCG/KG/MIN: 10 INJECTION, EMULSION INTRAVENOUS at 14:30

## 2023-01-09 RX ADMIN — LIDOCAINE HYDROCHLORIDE 50 MG: 20 INJECTION, SOLUTION INFILTRATION; PERINEURAL at 14:30

## 2023-01-09 RX ADMIN — SODIUM CHLORIDE, SODIUM LACTATE, POTASSIUM CHLORIDE, CALCIUM CHLORIDE: 600; 310; 30; 20 INJECTION, SOLUTION INTRAVENOUS at 14:30

## 2023-01-09 NOTE — H&P
Ceci Lopez  [id number]  female  52 year old      Reason for procedure/surgery: follow up Crohn's Disease    Patient Active Problem List   Diagnosis     Crohn's disease (H)       Past Surgical History:    Past Surgical History:   Procedure Laterality Date     COLONOSCOPY N/A 10/18/2021    Procedure: COLONOSCOPY, WITH BIOPSY;  Surgeon: Ruperto Cain MD;  Location: [facility] OR       Past Medical History: History reviewed. No pertinent past medical history.    Social History:   Social History     Tobacco Use     Smoking status: Never     Smokeless tobacco: Never   Substance Use Topics     Alcohol use: Not on file       Family History:   Family History   Problem Relation Age of Onset     Skin Cancer No family hx of      Melanoma No family hx of        Allergies:   Allergies   Allergen Reactions     Ibuprofen Other (See Comments)     Causes colitis flare ups.  Patient does not want to take this medication.     Erythromycin      PN: LW Reaction: Vomiting       Active Medications:   Current Outpatient Medications   Medication Sig Dispense Refill     bisacodyl (DULCOLAX) 5 MG EC tablet Take as directed. One day before exam take 2 tablets at 3 PM. Day of exam take 2 tablets at 6 AM. 4 tablet 0     calcium citrate and vitamin D (CITRACAL) 200-250 MG-UNIT TABS per tablet Take 2 tablets by mouth daily       mirabegron (MYRBETRIQ) 25 MG 24 hr tablet Take 1 tablet (25 mg) by mouth daily 30 tablet 3     ondansetron (ZOFRAN ODT) 4 MG ODT tab One tablet (4 mg) every 6 hours as needed for nausea during colonoscopy prep 3 tablet 0     polyethylene glycol (GOLYTELY) 236 g suspension Take as directed. One day before exam fill the jug with water. Cover and shake until well mixed. At 6 PM start drinking an 8oz glass of mixture every 15 minutes until jug is 1/2 empty. Store remainder in the refrigerator. Day of exam at 6 AM Drink the other half of the Golytely jug. Drink one 8-ounce glass every 15 minutes until the jug is empty.  "You should finish the prep 4 hours before the exam. 4000 mL 0     ustekinumab (STELARA) 90 MG/ML INJECT THE CONTENTS OF 1 SYRINGE UNDER THE SKIN EVERY 8 WEEKS 1 mL 4     Vitamin D3 (CHOLECALCIFEROL) 25 mcg (1000 units) tablet Take 25 mcg by mouth daily         Systemic Review:   CONSTITUTIONAL: NEGATIVE for fever, chills, change in weight  ENT/MOUTH: NEGATIVE for ear, mouth and throat problems  RESP: NEGATIVE for significant cough or SOB  CV: NEGATIVE for chest pain, palpitations or peripheral edema    Physical Examination:   Vital Signs: /70 (BP Location: Right arm)   Pulse 55   Temp 96.9  F (36.1  C) (Temporal)   Resp 18   Ht 1.6 m (5' 3\")   Wt 56.7 kg (125 lb)   LMP 12/14/2022 (Exact Date)   SpO2 100%   BMI 22.14 kg/m    GENERAL: healthy, alert and no distress  NECK: no adenopathy, no asymmetry, masses, or scars  RESP: lungs clear to auscultation - no rales, rhonchi or wheezes  CV: regular rate and rhythm, normal S1 S2, no S3 or S4, no murmur, click or rub, no peripheral edema and peripheral pulses strong  ABDOMEN: soft, nontender, no hepatosplenomegaly, no masses and bowel sounds normal  MS: no gross musculoskeletal defects noted, no edema    Plan: Appropriate to proceed as scheduled.      Ruperto Cain MD  1/9/2023    PCP:  Matt Callaway    "

## 2023-01-09 NOTE — ANESTHESIA CARE TRANSFER NOTE
Patient: Ceci Lopez    Procedure: Procedure(s):  COLONOSCOPY       Diagnosis: Crohn's disease of both small and large intestine without complication (H) [K50.80]  Diagnosis Additional Information: No value filed.    Anesthesia Type:   MAC     Note:    Oropharynx: spontaneously breathing and oropharynx clear of all foreign objects  Level of Consciousness: awake  Oxygen Supplementation: room air    Independent Airway: airway patency satisfactory and stable  Dentition: dentition unchanged  Vital Signs Stable: post-procedure vital signs reviewed and stable  Report to RN Given: handoff report given  Patient transferred to: Phase II    Handoff Report: Identifed the Patient, Identified the Reponsible Provider, Reviewed the pertinent medical history, Discussed the surgical course, Reviewed Intra-OP anesthesia mangement and issues during anesthesia, Set expectations for post-procedure period and Allowed opportunity for questions and acknowledgement of understanding      Vitals:  Vitals Value Taken Time   /60 01/09/23 1505   Temp 36.4  C (97.6  F) 01/09/23 1505   Pulse 60 01/09/23 1505   Resp 14 01/09/23 1505   SpO2 100 % 01/09/23 1505       Electronically Signed By: MATT Real CRNA  January 9, 2023  3:08 PM

## 2023-01-09 NOTE — ANESTHESIA POSTPROCEDURE EVALUATION
Patient: Ceci Lopez    Procedure: Procedure(s):  COLONOSCOPY       Anesthesia Type:  MAC    Note:  Disposition: Outpatient   Postop Pain Control: Uneventful            Sign Out: Well controlled pain   PONV: No   Neuro/Psych: Uneventful            Sign Out: Acceptable/Baseline neuro status   Airway/Respiratory: Uneventful            Sign Out: Acceptable/Baseline resp. status   CV/Hemodynamics: Uneventful            Sign Out: Acceptable CV status; No obvious hypovolemia; No obvious fluid overload   Other NRE: NONE   DID A NON-ROUTINE EVENT OCCUR? No           Last vitals:  Vitals Value Taken Time   /67 01/09/23 1520   Temp 36.4  C (97.6  F) 01/09/23 1520   Pulse 60 01/09/23 1520   Resp 16 01/09/23 1520   SpO2 100 % 01/09/23 1520       Electronically Signed By: Jaquan Rosen MD, MD  January 9, 2023  3:42 PM

## 2023-01-09 NOTE — ANESTHESIA PREPROCEDURE EVALUATION
Anesthesia Pre-Procedure Evaluation    Patient: Ceci Lopez   MRN: [id number] : 1970        Procedure : Procedure(s):  COLONOSCOPY          History reviewed. No pertinent past medical history.   Past Surgical History:   Procedure Laterality Date     COLONOSCOPY N/A 10/18/2021    Procedure: COLONOSCOPY, WITH BIOPSY;  Surgeon: Ruperto Cain MD;  Location: [facility] OR      Allergies   Allergen Reactions     Ibuprofen Other (See Comments)     Causes colitis flare ups.  Patient does not want to take this medication.     Erythromycin      PN: LW Reaction: Vomiting      Social History     Tobacco Use     Smoking status: Never     Smokeless tobacco: Never   Substance Use Topics     Alcohol use: Not on file      Wt Readings from Last 1 Encounters:   23 56.7 kg (125 lb)        Anesthesia Evaluation            ROS/MED HX  ENT/Pulmonary:  - neg pulmonary ROS     Neurologic:  - neg neurologic ROS     Cardiovascular:       METS/Exercise Tolerance:     Hematologic:  - neg hematologic  ROS     Musculoskeletal:  - neg musculoskeletal ROS     GI/Hepatic:     (+) Inflammatory bowel disease, bowel prep,     Renal/Genitourinary:  - neg Renal ROS     Endo:  - neg endo ROS     Psychiatric/Substance Use:  - neg psychiatric ROS     Infectious Disease:  - neg infectious disease ROS     Malignancy:  - neg malignancy ROS     Other:               OUTSIDE LABS:  CBC:   Lab Results   Component Value Date    WBC 8.1 2022    WBC 6.3 2022    HGB 12.0 2022    HGB 12.6 2022    HCT 36.4 2022    HCT 39.3 2022     2022     2022     BMP:   Lab Results   Component Value Date     2022     2021    POTASSIUM 4.2 2022    POTASSIUM 4.3 2021    CHLORIDE 102 2022    CHLORIDE 107 2021    CO2 27 2022    CO2 27 2021    BUN 10 2022    BUN 9 2021    CR 0.80 2022    CR 0.84 2021    GLC 81 2022  I made a referral to a rheumatologist who comes to [place] part time.  Let me know if he cannot get you in a timely fashion      GLC 75 08/25/2021     COAGS: No results found for: PTT, INR, FIBR  POC:   Lab Results   Component Value Date    HCG Negative 01/09/2023     HEPATIC:   Lab Results   Component Value Date    ALBUMIN 4.2 12/06/2022    PROTTOTAL 7.1 12/06/2022    ALT 30 12/06/2022    AST 33 12/06/2022    ALKPHOS 67 12/06/2022    BILITOTAL 0.2 12/06/2022     OTHER:   Lab Results   Component Value Date    CARMEL 9.5 05/04/2022    TSH 0.86 08/25/2021    CRP <3.00 12/06/2022    SED 10 12/06/2022       Anesthesia Plan    ASA Status:  2      Anesthesia Type: MAC.     - Reason for MAC: immobility needed, straight local not clinically adequate              Consents    Anesthesia Plan(s) and associated risks, benefits, and realistic alternatives discussed. Questions answered and patient/representative(s) expressed understanding.     - Discussed: Risks, Benefits and Alternatives for BOTH SEDATION and the PROCEDURE were discussed     - Discussed with:  Patient      - Extended Intubation/Ventilatory Support Discussed: No.      - Patient is DNR/DNI Status: No    Use of blood products discussed: No .     Postoperative Care    Pain management: IV analgesics, Oral pain medications.        Comments:                Jaquan Rosen MD, MD   negative

## 2023-02-06 DIAGNOSIS — N32.81 URGENCY-FREQUENCY SYNDROME: Primary | ICD-10-CM

## 2023-02-06 RX ORDER — OXYBUTYNIN CHLORIDE 5 MG/1
5 TABLET, EXTENDED RELEASE ORAL DAILY
Qty: 30 TABLET | Refills: 3 | Status: SHIPPED | OUTPATIENT
Start: 2023-02-06 | End: 2023-04-27

## 2023-02-06 NOTE — PROGRESS NOTES
Left message to notify the patient that myrbetriq is not covered on her plan and her insurance is recommending oxybutynin. Approved by Dr. Maki  Asked patient to call to discuss if she has questions    Isha Rosales RN, BSN  Care Coordinator Urology

## 2023-02-10 ENCOUNTER — THERAPY VISIT (OUTPATIENT)
Dept: PHYSICAL THERAPY | Facility: CLINIC | Age: 53
End: 2023-02-10
Attending: UROLOGY
Payer: COMMERCIAL

## 2023-02-10 DIAGNOSIS — M62.89 PELVIC FLOOR DYSFUNCTION: ICD-10-CM

## 2023-02-10 PROCEDURE — 97110 THERAPEUTIC EXERCISES: CPT | Mod: GP | Performed by: PHYSICAL THERAPIST

## 2023-02-10 PROCEDURE — 97161 PT EVAL LOW COMPLEX 20 MIN: CPT | Mod: GP | Performed by: PHYSICAL THERAPIST

## 2023-02-10 PROCEDURE — 97530 THERAPEUTIC ACTIVITIES: CPT | Mod: GP | Performed by: PHYSICAL THERAPIST

## 2023-02-10 NOTE — PROGRESS NOTES
Physical Therapy Initial Evaluation  Subjective:  The history is provided by the patient. No  was used.   Patient Health History  Ceci Lopez being seen for urgency issues.     Date of Onset: 12/15/22 date of order.   Problem occurred: ongoing   Pain is reported as 0/10 on pain scale.  General health as reported by patient is excellent.   Other medical history details: Crohn's in remission.   Red flags:  None as reported by patient.  Medical allergies: none.   Surgeries include:  None.     Other medications details: meds listed in my chart.    Current occupation is .   Primary job tasks include:  Computer work.                Therapist Assessment:   Clinical Impression: Pt presents with primary complaint of urinary frequency and urge urinary incontience.  Per clinical examination, pt with poor coordination of pelvic floor muscles.  Pt will benefit from skilled physical therapy for coordination training of pelvic floor muscles as well as education on bladder health and instruction on urge suppression techniques.    Chief Complaint:  The pt notes urge incontinence in her 20s. When she is not at home or on a walk she will have urinary frequency/urgency. When she starts to feel like she has to void then it immediately becomes urgent. The pt notes when she has the strong urge to urinate sometimes she will have some leaking. Her urge feels deeper near her bladder.    She notes that her bladder medication does help some.     She is on a heavy plant based diet and has been told she has moderate/severe chron's. She is on some medication for it and she has not been having any abdominal pain.    Current activity: hour long spinning working 2-3x a week, weight train class 1-2x a day of yoga, walking most days    Goals: reduce urgency to urinate    Urination   Do you leak on the way to the bathroom or with a strong urge to void? yes  Do you leak with cough, sneeze, jumping, running?  No, unless already feel urgency  Any other activities that cause leaking? no  Do you have triggers that make you feel you can't wait to go to the bathroom? n/a What are they? n/a  Type of pad and number used per day? Just liners 0-1  When you leak what is the amount? Few drops  How long can you delay the need to urinate? Sometimes minutes with discomfort  How many times do you get up to urinate at night? 1-2x  How many times do you urinate during the day? About 1x an hour  Can you stop the flow of urine when on the toilet? yes  Is the volume of urine passed usually: medium  Do you strain to pass urine? no  Do you have a slow or hesitant urinary stream? no  Do you have difficulty initiating the urine stream? no  How many bladder infections have you had in the last 12 months? 0  What is you fluid intake per day? Water (8oz) 6-8  Caffeine 2  Alcohol 0  Do you feel like you empty completely when voiding? Yes  She denies any vaginal pressure/heaviness or post void dribbling.    Bowel Habits  Frequency of bowel movements? 1x a day  Consistency of stool? [place] stool scale? Type 4/5  Do you ignore the urge to defecate? no  Do you strain to pass stool? no  Do you feel that you empty completely? yes    Pelvic Pain  When do you have pelvic pain? n/a  Are you sexually active? yes  Is initial penetration during intercourse painful? no  Is deeper penetration painful? no  Do you use lubrication? no  Have you given birth? no  Have you ever been worried for your physical safety? no  Any abdominal or pelvic surgeries? no  Are you having regular exercise? yes  Have you practiced the PF (kegel) exercise for 4 or more weeks? no    Objective:    POSTURE: slight forward head    ABDOMINAL WALL: no ttp of rectus abdominis    EXTERNAL ASSESSMENT:  Skin condition:noral  Bearing down/coughing:slight bulge with cough  Muscle contraction/perineal mobility: elevation and urogenital triangle descent     INTERNAL VAGINAL ASSESSMENT:  Baseline PF  tone:hyper  PF Tone with cough: NT   PF Response quality: sluggish  PF Power: Center: 3/5  Accessory Muscle use:occasional breath holding  Endurance: Maximum contraction in seconds:not tested  Quick contraction repetitions prior to fatigue: 10/10  Palpation: tenderness to palpation of: bulbocavernosus and levator ani bilaterally    Assessment/Plan:    Patient is a 52 year old female with pelvic complaints.    Patient has the following significant findings with corresponding treatment plan.                Diagnosis 1:  Pelvic floor dysfunction  Pain -  hot/cold therapy, US, electric stimulation, mechanical traction, manual therapy, STS, splint/taping/bracing/orthotics, self management, education, directional preference exercise and home program  Decreased ROM/flexibility - manual therapy, therapeutic exercise, therapeutic activity and home program  Decreased strength - therapeutic exercise, therapeutic activities and home program  Impaired muscle performance - biofeedback, electric stimulation, neuro re-education and home program  Decreased function - therapeutic activities, home program and functional performance testing    Therapy Evaluation Codes:   Cumulative Therapy Evaluation is: Low complexity.    Previous and current functional limitations:  (See Goal Flow Sheet for this information)    Short term and Long term goals: (See Goal Flow Sheet for this information)     Communication ability:  Patient appears to be able to clearly communicate and understand verbal and written communication and follow directions correctly.  Treatment Explanation - The following has been discussed with the patient:   RX ordered/plan of care  Anticipated outcomes  Possible risks and side effects  This patient would benefit from PT intervention to resume normal activities.   Rehab potential is good.    Frequency:  1 X week, once daily  Duration:  for 4 weeks tapering to 2 X a month over 8 weeks  Discharge Plan:  Achieve all  LTG.  Independent in home treatment program.  Reach maximal therapeutic benefit.    Please refer to the daily flowsheet for treatment today, total treatment time and time spent performing 1:1 timed codes.

## 2023-02-17 PROBLEM — M62.89 PELVIC FLOOR DYSFUNCTION: Status: ACTIVE | Noted: 2023-02-17

## 2023-04-12 ENCOUNTER — TELEPHONE (OUTPATIENT)
Dept: GASTROENTEROLOGY | Facility: CLINIC | Age: 53
End: 2023-04-12
Payer: COMMERCIAL

## 2023-04-12 DIAGNOSIS — K50.80 CROHN'S DISEASE OF BOTH SMALL AND LARGE INTESTINE WITHOUT COMPLICATION (H): Primary | ICD-10-CM

## 2023-04-12 NOTE — TELEPHONE ENCOUNTER
PA Needed    Medication: stelara  QTY/DS:1 per 31days  NEW INS:no  Insurance Company:  [facility] commercial  Pharmacy Filling the Rx:  yael ROSENBERG :  2023  Date of last fill:2023

## 2023-04-13 NOTE — TELEPHONE ENCOUNTER
PA Initiation    Medication: Stelara initiated  Insurance Company: [employer] ([facility]) - Phone [phone] Fax [phone]  Pharmacy Filling the Rx:    Filling Pharmacy Phone:    Filling Pharmacy Fax:    Start Date: 4/13/2023    [id number]

## 2023-04-14 ENCOUNTER — THERAPY VISIT (OUTPATIENT)
Dept: PHYSICAL THERAPY | Facility: CLINIC | Age: 53
End: 2023-04-14
Payer: COMMERCIAL

## 2023-04-14 DIAGNOSIS — M62.89 PELVIC FLOOR DYSFUNCTION: Primary | ICD-10-CM

## 2023-04-14 PROCEDURE — 97110 THERAPEUTIC EXERCISES: CPT | Mod: GP | Performed by: PHYSICAL THERAPIST

## 2023-04-14 PROCEDURE — 97530 THERAPEUTIC ACTIVITIES: CPT | Mod: GP | Performed by: PHYSICAL THERAPIST

## 2023-04-17 NOTE — TELEPHONE ENCOUNTER
Prior Authorization Approval    Authorization Effective Date:    Authorization Expiration Date: 4/14/2024  Medication: Felicitas ROSENBERG Approved  Approved Dose/Quantity: 1 per 56  Reference #: [id number]   Insurance Company: [employer] ([facility]) - Phone [phone] Fax [phone]  Expected CoPay:       CoPay Card Available:      Foundation Assistance Needed:    Which Pharmacy is filling the prescription (Not needed for infusion/clinic administered): [facility] - [place], [place] - [address]  Pharmacy Notified: Yes  Patient Notified:

## 2023-04-18 NOTE — TELEPHONE ENCOUNTER
Notified patient via [employer] that Stelara prior authorization renewal has been approved. Annual TB quantiferon gold and routine labs due at this time. Ordered labs, instructed patient to complete lab draw as soon as possible.

## 2023-04-21 ENCOUNTER — THERAPY VISIT (OUTPATIENT)
Dept: PHYSICAL THERAPY | Facility: CLINIC | Age: 53
End: 2023-04-21
Payer: COMMERCIAL

## 2023-04-21 DIAGNOSIS — M62.89 PELVIC FLOOR DYSFUNCTION: Primary | ICD-10-CM

## 2023-04-21 PROCEDURE — 97530 THERAPEUTIC ACTIVITIES: CPT | Mod: GP | Performed by: PHYSICAL THERAPIST

## 2023-04-21 PROCEDURE — 97110 THERAPEUTIC EXERCISES: CPT | Mod: GP | Performed by: PHYSICAL THERAPIST

## 2023-04-24 ENCOUNTER — LAB (OUTPATIENT)
Dept: LAB | Facility: CLINIC | Age: 53
End: 2023-04-24
Payer: COMMERCIAL

## 2023-04-24 DIAGNOSIS — K50.80 CROHN'S DISEASE OF BOTH SMALL AND LARGE INTESTINE WITHOUT COMPLICATION (H): ICD-10-CM

## 2023-04-24 DIAGNOSIS — R35.0 URINARY FREQUENCY: ICD-10-CM

## 2023-04-24 LAB
ALBUMIN SERPL BCG-MCNC: 4.4 G/DL (ref 3.5–5.2)
ALBUMIN UR-MCNC: NEGATIVE MG/DL
ALP SERPL-CCNC: 60 U/L (ref 35–104)
ALT SERPL W P-5'-P-CCNC: 25 U/L (ref 10–35)
APPEARANCE UR: CLEAR
AST SERPL W P-5'-P-CCNC: 27 U/L (ref 10–35)
BASOPHILS # BLD AUTO: 0 10E3/UL (ref 0–0.2)
BASOPHILS NFR BLD AUTO: 0 %
BILIRUB DIRECT SERPL-MCNC: <0.2 MG/DL (ref 0–0.3)
BILIRUB SERPL-MCNC: 0.2 MG/DL
BILIRUB UR QL STRIP: NEGATIVE
COLOR UR AUTO: ABNORMAL
CRP SERPL-MCNC: <3 MG/L
EOSINOPHIL # BLD AUTO: 0 10E3/UL (ref 0–0.7)
EOSINOPHIL NFR BLD AUTO: 1 %
ERYTHROCYTE [DISTWIDTH] IN BLOOD BY AUTOMATED COUNT: 12.3 % (ref 10–15)
ERYTHROCYTE [SEDIMENTATION RATE] IN BLOOD BY WESTERGREN METHOD: 14 MM/HR (ref 0–30)
GLUCOSE UR STRIP-MCNC: NEGATIVE MG/DL
HCT VFR BLD AUTO: 36.1 % (ref 35–47)
HGB BLD-MCNC: 11.9 G/DL (ref 11.7–15.7)
HGB UR QL STRIP: NEGATIVE
IMM GRANULOCYTES # BLD: 0 10E3/UL
IMM GRANULOCYTES NFR BLD: 0 %
KETONES UR STRIP-MCNC: NEGATIVE MG/DL
LEUKOCYTE ESTERASE UR QL STRIP: NEGATIVE
LYMPHOCYTES # BLD AUTO: 1.7 10E3/UL (ref 0.8–5.3)
LYMPHOCYTES NFR BLD AUTO: 31 %
MCH RBC QN AUTO: 30.9 PG (ref 26.5–33)
MCHC RBC AUTO-ENTMCNC: 33 G/DL (ref 31.5–36.5)
MCV RBC AUTO: 94 FL (ref 78–100)
MONOCYTES # BLD AUTO: 0.2 10E3/UL (ref 0–1.3)
MONOCYTES NFR BLD AUTO: 4 %
MUCOUS THREADS #/AREA URNS LPF: PRESENT /LPF
NEUTROPHILS # BLD AUTO: 3.5 10E3/UL (ref 1.6–8.3)
NEUTROPHILS NFR BLD AUTO: 64 %
NITRATE UR QL: NEGATIVE
NRBC # BLD AUTO: 0 10E3/UL
NRBC BLD AUTO-RTO: 0 /100
PH UR STRIP: 7.5 [PH] (ref 5–7)
PLATELET # BLD AUTO: 226 10E3/UL (ref 150–450)
PROT SERPL-MCNC: 7.6 G/DL (ref 6.4–8.3)
RBC # BLD AUTO: 3.85 10E6/UL (ref 3.8–5.2)
RBC URINE: 1 /HPF
SP GR UR STRIP: 1 (ref 1–1.03)
UROBILINOGEN UR STRIP-MCNC: NORMAL MG/DL
WBC # BLD AUTO: 5.5 10E3/UL (ref 4–11)
WBC URINE: <1 /HPF

## 2023-04-24 PROCEDURE — 80076 HEPATIC FUNCTION PANEL: CPT | Performed by: PATHOLOGY

## 2023-04-24 PROCEDURE — 86481 TB AG RESPONSE T-CELL SUSP: CPT | Performed by: INTERNAL MEDICINE

## 2023-04-24 PROCEDURE — 86140 C-REACTIVE PROTEIN: CPT | Performed by: PATHOLOGY

## 2023-04-24 PROCEDURE — 85652 RBC SED RATE AUTOMATED: CPT | Performed by: PATHOLOGY

## 2023-04-24 PROCEDURE — 81001 URINALYSIS AUTO W/SCOPE: CPT | Performed by: PATHOLOGY

## 2023-04-24 PROCEDURE — 85025 COMPLETE CBC W/AUTO DIFF WBC: CPT | Performed by: PATHOLOGY

## 2023-04-24 PROCEDURE — 36415 COLL VENOUS BLD VENIPUNCTURE: CPT | Performed by: PATHOLOGY

## 2023-04-26 LAB
GAMMA INTERFERON BACKGROUND BLD IA-ACNC: 0 IU/ML
M TB IFN-G BLD-IMP: NEGATIVE
M TB IFN-G CD4+ BCKGRND COR BLD-ACNC: 10 IU/ML
MITOGEN IGNF BCKGRD COR BLD-ACNC: 0.05 IU/ML
MITOGEN IGNF BCKGRD COR BLD-ACNC: 0.08 IU/ML
QUANTIFERON MITOGEN: 10 IU/ML
QUANTIFERON NIL TUBE: 0 IU/ML
QUANTIFERON TB1 TUBE: 0.08 IU/ML
QUANTIFERON TB2 TUBE: 0.05

## 2023-04-27 ENCOUNTER — VIRTUAL VISIT (OUTPATIENT)
Dept: GASTROENTEROLOGY | Facility: CLINIC | Age: 53
End: 2023-04-27
Payer: COMMERCIAL

## 2023-04-27 ENCOUNTER — TELEPHONE (OUTPATIENT)
Dept: GASTROENTEROLOGY | Facility: CLINIC | Age: 53
End: 2023-04-27

## 2023-04-27 VITALS — WEIGHT: 122 LBS | BODY MASS INDEX: 21.61 KG/M2

## 2023-04-27 DIAGNOSIS — K50.80 CROHN'S DISEASE OF BOTH SMALL AND LARGE INTESTINE WITHOUT COMPLICATION (H): Primary | ICD-10-CM

## 2023-04-27 PROCEDURE — 99213 OFFICE O/P EST LOW 20 MIN: CPT | Mod: VID | Performed by: INTERNAL MEDICINE

## 2023-04-27 ASSESSMENT — PAIN SCALES - GENERAL: PAINLEVEL: NO PAIN (0)

## 2023-04-27 NOTE — LETTER
4/27/2023         RE: Ceci Lopez  [address] [place] [id number]        Dear Colleague,    Thank you for referring your patient, Ceci Lopez, to the [facility] [place]. Please see a copy of my visit note below.      IBD CLINIC VISIT    CC/REFERRING MD:  Referred Self    REASON FOR CONSULTATION: follow up CD    ASSESSMENT/PLAN:    1. Crohn's ileocolitis - now in endoscopic remission on ustekinumab. Great news!    -continue ustekinumab q 8 weeks  -continue maintenance labs every 3 months  -repeat colonoscopy due 2025      IBD HISTORY  Age at diagnosis: 2005  Extent of disease: initially left sided - however most recent colonoscopy shows ileitis and ascending colitis. Biopsies with inflammation throughout colon except descending/sigmoid  Disease phenotype: inflammatory  Ruthy-anal disease: none  Current CD medications:   -Ustekinumab  Prior IBD surgeries: none  Prior IBD Medications:   -Apriso  - other mesalamine  - prednisone 2018    DRUG MONITORING  TPMT enzyme activity: 28 WNL    6-TGN/6-MMPN levels: --    Biologic concentration: --    DISEASE ASSESSMENT  Labs  Recent Labs   Lab Test 04/24/23  1546 12/06/22  1432 05/04/22  1419 05/04/22  1419 08/25/21  1110   CRP  --   --   --  <0.1 <2.9   SED 14 10   < > 13  --     < > = values in this interval not displayed.     Fecal calprotectin: None  Endoscopy:   August 21, 2020 EGD and Colonoscopy  EGD was normal. Gastric and duodenal biopsies were negative.     Colonoscopy showed 4 aphthous ulcers in the terminal ileum. Some minimal erythema in the distal rectum. Biopsies of the ileum revealed some nonspecific minimally active chornic ileitis possibly due to backwash ileitis per pathologist. The rectal biopsy showed some minimally active UC, but the remainder of the colon biopsies were negative.      Colonscopy 10/2021 - ulceration in ileum. Biopsies chronic active ileitis. Mild inflammation in ascending colon. More c/w  Crohn's.  Enterography: MRE 11/10/21  1. Minimal wall thickening and minimal patchy increased enhancement in  the terminal ileum suggests very mild ileitis. Remainder of the small  bowel is normal.  2. Myomatous uterus.  C diff: None    Colonoscopy 1/2023 - normal TI and colon. SESCD score 0    sIBDQ:   IBDQ Score Date IBDQ - Total Score  (Higher score better)   4/25/2022   9:02 AM 63   11/17/2021   3:01 PM 63       IBD Health Care Maintenance:    Vaccinations:  All patients on biologics should avoid live vaccines.    -- Influenza (every year)  -- TdaP (every 10 years)  -- Pneumococcal Pneumonia (once plus booster at 5 years)  -- Yearly assessment for latent Tb (verbal screening and exam, PPD or QuantiFERON-Tb testing)    One time confirmation of immunity or serologies:  -- Hepatitis A (serologies or immunizations)  -- Hepatitis B (serologies or immunizations)  -- Varicella  -- MMR  -- HPV (all aged 18-26)  -- Meningococcal meningitis (all patients at risk for meningitis)  -- Due to the immunosuppression in this patient, I would not advise administration of live vaccines such as varicella/VZV, intranasal influenza, MMR, or yellow fever vaccine (if travelling).      Bone mineral density screening   -- Recommend all patients supplement with calcium and vitamin D  -- Given prior steroid use recommend DEXA if not already done - ORDERED    Cancer Screening:  Colon cancer screening:  Given colonic involvement, recommend patient undergo regular dysplasia surveillance   Next dysplasia screening is recommended 2025.    Cervical cancer screening: Annual due to immunosupression    Skin cancer screening: Annual visual exam of skin by dermatologist since patient is immunocompromised    Depression Screening:  -- Over the last month, have you felt down, depressed, or hopeless? no  -- Over the last month, have you felt little interest or pleasure doing things? no    Misc:  -- Avoid tobacco use  -- Avoid NSAIDs as there is  potentially a 25% chance of causing an IBD flare    Return to clinic in 6 months with Lucy Alexander and 12 months Dr. Cain    Thank you for this consultation.  It was a pleasure to participate in the care of this patient; please contact us with any further questions.  I spent a total of 25 minutes during the day of encounter performed chart review, meeting with patient, patient counseling, care coordination, and documentation.      This note was created with voice recognition software, and while reviewed for accuracy, typos may remain.     Ruperto Cain MD  Division of Gastroenterology, Hepatology and Nutrition  [facility]  Pager: [phone]      HPI:   Currently, here for follow up after colonoscopy in January. Looked great. Complete endoscopic healing.     She continues to do great. Tolerating the Stelara well. No side effects.    No GI symptoms. No abdominal pain, tenesmus, diarrhea, blood in stool. No weight loss.     No rashes, joint pains, mouth sores, eye pain, eye redness    She tapered off the apriso without issue last year.     She is getting a COVID booster and her first shingrix tomorrow.    ROS:    No fevers or chills  No weight loss  No blurry vision, double vision or change in vision  No sore throat  No lymphadenopathy  No headache, paraesthesias, or weakness in a limb  No shortness of breath or wheezing  No chest pain or pressure  No arthralgias or myalgias  No rashes or skin changes  No odynophagia or dysphagia  No BRBPR, hematochezia, melena  No dysuria, frequency or urgency  No hot/cold intolerance or polyria  No anxiety or depression    Extra intestinal manifestations of IBD:  No uveitis/episcleritis  No aphthous ulcers   No arthritis   No erythema nodosum/pyoderma gangrenosum.     PERTINENT PAST MEDICAL HISTORY:  No past medical history on file.    PREVIOUS SURGERIES:  Past Surgical History:   Procedure Laterality Date    COLONOSCOPY N/A 10/18/2021    Procedure: COLONOSCOPY, WITH BIOPSY;   Surgeon: Ke Cain MD;  Location: [facility] OR    COLONOSCOPY N/A 1/9/2023    Procedure: COLONOSCOPY;  Surgeon: Ke Cain MD;  Location: [facility] OR       PREVIOUS ENDOSCOPY:  Results for orders placed or performed during the hospital encounter of 01/09/23   COLONOSCOPY   Result Value Ref Range    COLONOSCOPY       [facility]  [address] [place], [place] [address]., [place] [address] [phone]     Endoscopy Department  _______________________________________________________________________________  Patient Name: Ceci Lopez            Procedure Date: 1/9/2023 2:19 PM  MRN: [id number]                       Account Number: [id number]  YOB: 1970             Admit Type: Outpatient  Age: 52                               Room: [facility] PROCEDURE ROOM 03  Gender: Female                        Note Status: Finalized  Attending MD: KE CAIN MD  Total Sedation Time:   _______________________________________________________________________________     Procedure:             Colonoscopy  Indications:           Assess therapeutic response to therapy of Crohn's                          disease of the small bowel and colon  Providers:             KE CAIN MD, Mason Franz RN,                          MASON HER  Referring MD:          CITLALI Finney  Requesting Provider:   Matt Callaway MD  Medicines:             Monitored Anesthesia Care  Complications:         No immediate complications.  _______________________________________________________________________________  Procedure:             Pre-Anesthesia Assessment:                         - See EPIC H and P note                         After obtaining informed consent, the colonoscope was                          passed under direct vision. Throughout the procedure,                          the patient's blood pressure, pulse, and oxygen                          saturations were  monitored continuously. The                          Colonoscope was introduced through the anus and                          advanced to the terminal ileum, with identification of                          the appendiceal orifice and IC valve. The colonoscopy                          was performed without difficulty. The patient                          tolerated the procedure well. The quality  of the bowel                          preparation was fair.                                                                                   Findings:       The perianal and digital rectal examinations were normal.       The terminal ileum appeared normal.       The entire examined colon appeared normal on direct and retroflexion        views.       The Simple Endoscopic Score for Crohn's Disease was determined based on        the endoscopic appearance of the mucosa in the following segments:       - Ileum: Findings include no ulcers present, no ulcerated surfaces, no        affected surfaces and no narrowings. Segment score: 0.       - Right Colon: Findings include no ulcers present, no ulcerated        surfaces, no affected surfaces and no narrowings. Segment score: 0.       - Transverse Colon: Findings include no ulcers present, no ulcerated        surfaces, no affected surfaces and no narrowings. Segment score: 0.       - Left Colon: Findings include no ulcers present, no ulcerate d surfaces,        no affected surfaces and no narrowings. Segment score: 0.       - Rectum: Findings include no ulcers present, no ulcerated surfaces, no        affected surfaces and no narrowings. Segment score: 0.       - Total SES-CD aggregate score: 0.                                                                                   Impression:            - Preparation of the colon was fair.                         - The examined portion of the ileum was normal.                         - The entire examined colon is normal on direct and                           retroflexion views.                         - Simple Endoscopic Score for Crohn's Disease: 0,                          mucosal inflammatory changes secondary to Crohn's                          disease, in remission.                         - No specimens collected.  Recommendation:        - Discharge patient to home (with escort).                         - Resume previous diet.                         - Continue present me dications.                         - Repeat colonoscopy in 2 years for surveillance                          (surveillance biopsies were obtained in 10/2021 and                          are not required now). Repeat surveillance biopsies                          with next colonoscopy (2023).                         - ALL FUTURE COLONOSCOPIES SHOULD HAVE DOUBLE                          (EXTENDED) PREP                                                                                     Electronically Signed by: Dr. Ruperto French  ___________________________  RUPERTO FRENCH MD  1/9/2023 3:08:01 PM  I was physically present for the entire viewing portion of the exam.  __________________________  Signature of teaching physician  RUPERTO FRENCH MD  Number of Addenda: 0    Note Initiated On: 1/9/2023 2:19 PM  Scope In:  Scope Out:     ]    ALLERGIES:     Allergies   Allergen Reactions    Ibuprofen Other (See Comments)     Causes colitis flare ups.  Patient does not want to take this medication.    Erythromycin      PN: LW Reaction: Vomiting       PERTINENT MEDICATIONS:    Current Outpatient Medications:     calcium citrate and vitamin D (CITRACAL) 200-250 MG-UNIT TABS per tablet, Take 2 tablets by mouth daily, Disp: , Rfl:     ustekinumab (STELARA) 90 MG/ML, INJECT THE CONTENTS OF 1 SYRINGE UNDER THE SKIN EVERY 8 WEEKS, Disp: 1 mL, Rfl: 4    Vitamin D3 (CHOLECALCIFEROL) 25 mcg (1000 units) tablet, Take 25 mcg by mouth daily, Disp: , Rfl:     mirabegron (MYRBETRIQ) 25  MG 24 hr tablet, Take 1 tablet (25 mg) by mouth daily, Disp: 30 tablet, Rfl: 3    oxybutynin ER (DITROPAN XL) 5 MG 24 hr tablet, Take 1 tablet (5 mg) by mouth daily, Disp: 30 tablet, Rfl: 3    SOCIAL HISTORY:  Social History     Socioeconomic History    Marital status:      Spouse name: Not on file    Number of children: Not on file    Years of education: Not on file    Highest education level: Not on file   Occupational History    Not on file   Tobacco Use    Smoking status: Never     Passive exposure: Never    Smokeless tobacco: Never   Vaping Use    Vaping status: Never Used   Substance and Sexual Activity    Alcohol use: Not on file    Drug use: Not on file    Sexual activity: Not on file   Other Topics Concern    Not on file   Social History Narrative    Not on file     Social Determinants of Health     Financial Resource Strain: Not on file   Food Insecurity: Not on file   Transportation Needs: Not on file   Physical Activity: Not on file   Stress: Not on file   Social Connections: Not on file   Intimate Partner Violence: Not on file   Housing Stability: Not on file       FAMILY HISTORY:  Family History   Problem Relation Age of Onset    Skin Cancer No family hx of     Melanoma No family hx of        Past/family/social history reviewed and no changes    PHYSICAL EXAMINATION:  Constitutional: aaox3, cooperative, pleasant, not dyspneic/diaphoretic, no acute distress  Vitals reviewed: Wt 55.3 kg (122 lb)   BMI 21.61 kg/m    Wt:   Wt Readings from Last 2 Encounters:   04/27/23 55.3 kg (122 lb)   01/09/23 56.7 kg (125 lb)      Eyes: Sclera anicteric/injected  Respiratory: Unlabored breathing  Skin: no jaundice  Psych: Normal affect      PERTINENT STUDIES:  Most recent CBC:  Recent Labs   Lab Test 04/24/23  1546 12/06/22  1432   WBC 5.5 8.1   HGB 11.9 12.0   HCT 36.1 36.4    251     Most recent hepatic panel:  Recent Labs   Lab Test 04/24/23  1546 12/06/22  1432   ALT 25 30   AST 27 33     Most recent  creatinine:  Recent Labs   Lab Test 05/04/22  1419 08/25/21  1110   CR 0.80 0.84               Again, thank you for allowing me to participate in the care of your patient.      Sincerely,    Ruperto Cain MD

## 2023-04-27 NOTE — NURSING NOTE
Is the patient currently in the state of [place]? YES    Visit mode:VIDEO    If the visit is dropped, the patient can be reconnected by: VIDEO VISIT: Text to cell phone: [phone]    Will anyone else be joining the visit? NO      How would you like to obtain your AVS? MyChart    Are changes needed to the allergy or medication list? NO    Reason for visit: Video Visit (Return )

## 2023-04-27 NOTE — PROGRESS NOTES
Virtual Visit Details    Type of service:  Video Visit     Originating Location (pt. Location): Home    Distant Location (provider location):  On-site  Platform used for Video Visit: MollyWatr     Video start: 10:52 AM  Video end: 11:02 AM    IBD CLINIC VISIT    CC/REFERRING MD:  Referred Self    REASON FOR CONSULTATION: follow up CD    ASSESSMENT/PLAN:    1. Crohn's ileocolitis - now in endoscopic remission on ustekinumab. Great news!    -continue ustekinumab q 8 weeks  -continue maintenance labs every 3 months  -repeat colonoscopy due 2025      IBD HISTORY  Age at diagnosis: 2005  Extent of disease: initially left sided - however most recent colonoscopy shows ileitis and ascending colitis. Biopsies with inflammation throughout colon except descending/sigmoid  Disease phenotype: inflammatory  Ruthy-anal disease: none  Current CD medications:   -Ustekinumab  Prior IBD surgeries: none  Prior IBD Medications:   -Apriso  - other mesalamine  - prednisone 2018    DRUG MONITORING  TPMT enzyme activity: 28 WNL    6-TGN/6-MMPN levels: --    Biologic concentration: --    DISEASE ASSESSMENT  Labs  Recent Labs   Lab Test 04/24/23  1546 12/06/22  1432 05/04/22  1419 05/04/22  1419 08/25/21  1110   CRP  --   --   --  <0.1 <2.9   SED 14 10   < > 13  --     < > = values in this interval not displayed.     Fecal calprotectin: None  Endoscopy:   August 21, 2020 EGD and Colonoscopy  EGD was normal. Gastric and duodenal biopsies were negative.     Colonoscopy showed 4 aphthous ulcers in the terminal ileum. Some minimal erythema in the distal rectum. Biopsies of the ileum revealed some nonspecific minimally active chornic ileitis possibly due to backwash ileitis per pathologist. The rectal biopsy showed some minimally active UC, but the remainder of the colon biopsies were negative.      Colonscopy 10/2021 - ulceration in ileum. Biopsies chronic active ileitis. Mild inflammation in ascending colon. More c/w Crohn's.  Enterography: MRE  11/10/21  1. Minimal wall thickening and minimal patchy increased enhancement in  the terminal ileum suggests very mild ileitis. Remainder of the small  bowel is normal.  2. Myomatous uterus.  C diff: None    Colonoscopy 1/2023 - normal TI and colon. SESCD score 0    sIBDQ:   IBDQ Score Date IBDQ - Total Score  (Higher score better)   4/25/2022   9:02 AM 63   11/17/2021   3:01 PM 63       IBD Health Care Maintenance:    Vaccinations:  All patients on biologics should avoid live vaccines.    -- Influenza (every year)  -- TdaP (every 10 years)  -- Pneumococcal Pneumonia (once plus booster at 5 years)  -- Yearly assessment for latent Tb (verbal screening and exam, PPD or QuantiFERON-Tb testing)    One time confirmation of immunity or serologies:  -- Hepatitis A (serologies or immunizations)  -- Hepatitis B (serologies or immunizations)  -- Varicella  -- MMR  -- HPV (all aged 18-26)  -- Meningococcal meningitis (all patients at risk for meningitis)  -- Due to the immunosuppression in this patient, I would not advise administration of live vaccines such as varicella/VZV, intranasal influenza, MMR, or yellow fever vaccine (if travelling).      Bone mineral density screening   -- Recommend all patients supplement with calcium and vitamin D  -- Given prior steroid use recommend DEXA if not already done - ORDERED    Cancer Screening:  Colon cancer screening:  Given colonic involvement, recommend patient undergo regular dysplasia surveillance   Next dysplasia screening is recommended 2025.    Cervical cancer screening: Annual due to immunosupression    Skin cancer screening: Annual visual exam of skin by dermatologist since patient is immunocompromised    Depression Screening:  -- Over the last month, have you felt down, depressed, or hopeless? no  -- Over the last month, have you felt little interest or pleasure doing things? no    Misc:  -- Avoid tobacco use  -- Avoid NSAIDs as there is potentially a 25% chance of causing  an IBD flare    Return to clinic in 6 months with Lucy Alexander and 12 months Dr. Cain    Thank you for this consultation.  It was a pleasure to participate in the care of this patient; please contact us with any further questions.  I spent a total of 25 minutes during the day of encounter performed chart review, meeting with patient, patient counseling, care coordination, and documentation.      This note was created with voice recognition software, and while reviewed for accuracy, typos may remain.     Ruperto Cain MD  Division of Gastroenterology, Hepatology and Nutrition  [facility]  Pager: [phone]      HPI:   Currently, here for follow up after colonoscopy in January. Looked great. Complete endoscopic healing.     She continues to do great. Tolerating the Stelara well. No side effects.    No GI symptoms. No abdominal pain, tenesmus, diarrhea, blood in stool. No weight loss.     No rashes, joint pains, mouth sores, eye pain, eye redness    She tapered off the apriso without issue last year.     She is getting a COVID booster and her first shingrix tomorrow.    ROS:    No fevers or chills  No weight loss  No blurry vision, double vision or change in vision  No sore throat  No lymphadenopathy  No headache, paraesthesias, or weakness in a limb  No shortness of breath or wheezing  No chest pain or pressure  No arthralgias or myalgias  No rashes or skin changes  No odynophagia or dysphagia  No BRBPR, hematochezia, melena  No dysuria, frequency or urgency  No hot/cold intolerance or polyria  No anxiety or depression    Extra intestinal manifestations of IBD:  No uveitis/episcleritis  No aphthous ulcers   No arthritis   No erythema nodosum/pyoderma gangrenosum.     PERTINENT PAST MEDICAL HISTORY:  No past medical history on file.    PREVIOUS SURGERIES:  Past Surgical History:   Procedure Laterality Date     COLONOSCOPY N/A 10/18/2021    Procedure: COLONOSCOPY, WITH BIOPSY;  Surgeon: Ruperto Cain  MD Lisa;  Location: [facility] [place]     COLONOSCOPY N/A 1/9/2023    Procedure: COLONOSCOPY;  Surgeon: Ke Cain MD;  Location: [facility] [place] OR       PREVIOUS ENDOSCOPY:  Results for orders placed or performed during the hospital encounter of 01/09/23   COLONOSCOPY   Result Value Ref Range    COLONOSCOPY       [facility]  [address] [place], [place] [address]., [place] [address] [phone]     Endoscopy Department  _______________________________________________________________________________  Patient Name: Ceci Lopez            Procedure Date: 1/9/2023 2:19 PM  MRN: [id number]                       Account Number: [id number]  YOB: 1970             Admit Type: Outpatient  Age: 52                               Room: [facility] [place] PROCEDURE ROOM 03  Gender: Female                        Note Status: Finalized  Attending MD: KE CAIN MD  Total Sedation Time:   _______________________________________________________________________________     Procedure:             Colonoscopy  Indications:           Assess therapeutic response to therapy of Crohn's                          disease of the small bowel and colon  Providers:             KE CAIN MD, Mason Franz RN,                          MASON HER  Referring MD:          CITLALI Finney  Requesting Provider:   Matt Callaway MD  Medicines:             Monitored Anesthesia Care  Complications:         No immediate complications.  _______________________________________________________________________________  Procedure:             Pre-Anesthesia Assessment:                         - See EPIC H and P note                         After obtaining informed consent, the colonoscope was                          passed under direct vision. Throughout the procedure,                          the patient's blood pressure, pulse, and oxygen                          saturations were monitored continuously. The                           Colonoscope was introduced through the anus and                          advanced to the terminal ileum, with identification of                          the appendiceal orifice and IC valve. The colonoscopy                          was performed without difficulty. The patient                          tolerated the procedure well. The quality  of the bowel                          preparation was fair.                                                                                   Findings:       The perianal and digital rectal examinations were normal.       The terminal ileum appeared normal.       The entire examined colon appeared normal on direct and retroflexion        views.       The Simple Endoscopic Score for Crohn's Disease was determined based on        the endoscopic appearance of the mucosa in the following segments:       - Ileum: Findings include no ulcers present, no ulcerated surfaces, no        affected surfaces and no narrowings. Segment score: 0.       - Right Colon: Findings include no ulcers present, no ulcerated        surfaces, no affected surfaces and no narrowings. Segment score: 0.       - Transverse Colon: Findings include no ulcers present, no ulcerated        surfaces, no affected surfaces and no narrowings. Segment score: 0.       - Left Colon: Findings include no ulcers present, no ulcerate d surfaces,        no affected surfaces and no narrowings. Segment score: 0.       - Rectum: Findings include no ulcers present, no ulcerated surfaces, no        affected surfaces and no narrowings. Segment score: 0.       - Total SES-CD aggregate score: 0.                                                                                   Impression:            - Preparation of the colon was fair.                         - The examined portion of the ileum was normal.                         - The entire examined colon is normal on direct and                           retroflexion views.                         - Simple Endoscopic Score for Crohn's Disease: 0,                          mucosal inflammatory changes secondary to Crohn's                          disease, in remission.                         - No specimens collected.  Recommendation:        - Discharge patient to home (with escort).                         - Resume previous diet.                         - Continue present me dications.                         - Repeat colonoscopy in 2 years for surveillance                          (surveillance biopsies were obtained in 10/2021 and                          are not required now). Repeat surveillance biopsies                          with next colonoscopy (2023).                         - ALL FUTURE COLONOSCOPIES SHOULD HAVE DOUBLE                          (EXTENDED) PREP                                                                                     Electronically Signed by: Dr. Ruperto French  ___________________________  RUPERTO FRENCH MD  1/9/2023 3:08:01 PM  I was physically present for the entire viewing portion of the exam.  __________________________  Signature of teaching physician  RUPERTO FRENCH MD  Number of Addenda: 0    Note Initiated On: 1/9/2023 2:19 PM  Scope In:  Scope Out:     ]    ALLERGIES:     Allergies   Allergen Reactions     Ibuprofen Other (See Comments)     Causes colitis flare ups.  Patient does not want to take this medication.     Erythromycin      PN: LW Reaction: Vomiting       PERTINENT MEDICATIONS:    Current Outpatient Medications:      calcium citrate and vitamin D (CITRACAL) 200-250 MG-UNIT TABS per tablet, Take 2 tablets by mouth daily, Disp: , Rfl:      ustekinumab (STELARA) 90 MG/ML, INJECT THE CONTENTS OF 1 SYRINGE UNDER THE SKIN EVERY 8 WEEKS, Disp: 1 mL, Rfl: 4     Vitamin D3 (CHOLECALCIFEROL) 25 mcg (1000 units) tablet, Take 25 mcg by mouth daily, Disp: , Rfl:      mirabegron (MYRBETRIQ) 25 MG 24 hr tablet,  Take 1 tablet (25 mg) by mouth daily, Disp: 30 tablet, Rfl: 3     oxybutynin ER (DITROPAN XL) 5 MG 24 hr tablet, Take 1 tablet (5 mg) by mouth daily, Disp: 30 tablet, Rfl: 3    SOCIAL HISTORY:  Social History     Socioeconomic History     Marital status:      Spouse name: Not on file     Number of children: Not on file     Years of education: Not on file     Highest education level: Not on file   Occupational History     Not on file   Tobacco Use     Smoking status: Never     Passive exposure: Never     Smokeless tobacco: Never   Vaping Use     Vaping status: Never Used   Substance and Sexual Activity     Alcohol use: Not on file     Drug use: Not on file     Sexual activity: Not on file   Other Topics Concern     Not on file   Social History Narrative     Not on file     Social Determinants of Health     Financial Resource Strain: Not on file   Food Insecurity: Not on file   Transportation Needs: Not on file   Physical Activity: Not on file   Stress: Not on file   Social Connections: Not on file   Intimate Partner Violence: Not on file   Housing Stability: Not on file       FAMILY HISTORY:  Family History   Problem Relation Age of Onset     Skin Cancer No family hx of      Melanoma No family hx of        Past/family/social history reviewed and no changes    PHYSICAL EXAMINATION:  Constitutional: aaox3, cooperative, pleasant, not dyspneic/diaphoretic, no acute distress  Vitals reviewed: Wt 55.3 kg (122 lb)   BMI 21.61 kg/m    Wt:   Wt Readings from Last 2 Encounters:   04/27/23 55.3 kg (122 lb)   01/09/23 56.7 kg (125 lb)      Eyes: Sclera anicteric/injected  Respiratory: Unlabored breathing  Skin: no jaundice  Psych: Normal affect      PERTINENT STUDIES:  Most recent CBC:  Recent Labs   Lab Test 04/24/23  1546 12/06/22  1432   WBC 5.5 8.1   HGB 11.9 12.0   HCT 36.1 36.4    251     Most recent hepatic panel:  Recent Labs   Lab Test 04/24/23  1546 12/06/22  1432   ALT 25 30   AST 27 33     Most recent  creatinine:  Recent Labs   Lab Test 05/04/22  1419 08/25/21  1110   CR 0.80 0.84

## 2023-04-27 NOTE — PATIENT INSTRUCTIONS
It is good to see you today. I am so glad you are doing so well. Keep up the great work!!    Continue your Stelara as you are.    Continue with labs every 3 months    I have ordered a bone density scan. My office will contact you about scheduling this.    I agree with you getting your shingles vaccine and your COVID booster vaccine    Follow up with Lucy Alexander in 6 months and Dr. Cain in 12 months

## 2023-04-27 NOTE — TELEPHONE ENCOUNTER
----- Message from Ruperto Cain MD sent at 4/27/2023 11:04 AM CDT -----  Regarding: COntact info to schedule DEXA scan  Can you help give contact info to schedule DEXA scan    Thanks  DALTON

## 2023-05-25 NOTE — PROGRESS NOTES
Disease State Management Encounter:                          Ceci Lopez is a 52 year old female called for a follow-up visit. Last met with Sanjuanita on 5/17/22.      Reason for visit: health care maintenance review on Stelara  Medication Adherence/Access: no issues reported    Crohn's: Continues Stelara 90 mg every 8 weeks (reports her last injection was end of April)     Met with Dr. Cain on 4/27/23. Last colonoscopy 1/2/23 showed endoscopic remission. Started ustekinumab infusion 1/24/22. Notes she was previous asymptomatic and she continues to be feeling great. Vegan diet  Prior IBD surgeries: none  Prior IBD Medications: Apriso    Last Stelara maintenance labs done 4/24/23     IBD Health Care Maintenance:  Vaccinations:  All patients on biologics should avoid live vaccines, such as varicella/VZV, intranasal influenza, MMR, or yellow fever vaccine (if traveling).    -- Influenza (every year) 9/9/22  -- TdaP (every 10 years) 12/9/2019  -- Pneumococcal Pneumonia               Prevnar-13: 7/25/2018              Pneumovax-23: 9/25/2018   Prevnar-20: 7/11/22  -- COVID-19 4/1/21, 4/29/21, 9/2/21, 2/4/22, bivalent 9/9/22 and 4/28/23  -- Yearly assessment for latent Tb (verbal screening and exam, PPD or QuantiFERON-Tb testing)              - negative 4/24/23    One time confirmation of immunity or serologies:  -- Hepatitis A (serologies or immunizations) vaccination 3/12/2009 and 12/9/2019  -- Hepatitis B (serologies or immunizations) serologies 8/2021 indicate immunity  -- Varicella/Zoster had chickenpox. Shingles vaccine #1 4/28/23      Pre-Biologic Screening: completed 8/2021  -- Hep B Surface Antibody serologies indicate immunity  -- Hep B Surface Antigen non-reactive  -- Hep B Core Antibody non-reactive    Bone mineral density screening   DEXA set up for 6/9/23  Reports that she has only had steroids once in the last 5 years  Taking calcium + Vitamin D two tablets daily (unsure of dose)  Dietary calcium = she  does not eat dairy, but drinks oat milk and has a diet that is heavy on veggies  No results found for: VITDT     Cancer Screening:  Colon cancer screening:repeat colonoscopy due 2025 per Dr. Cain     Cervical cancer screening: Per OBGYN --she plans on following with her PCP in the fall     Skin cancer screening: Annual visual exam of skin by dermatologist since patient is immunocompromised. Last skin check done on 10/20/22.       Today's Vitals: There were no vitals taken for this visit.    Assessment/Plan:  Today we discussed her health maintenance in detail.  She is up-to-date on all of her vaccinations, and plans on getting her second shingles vaccine in July.  She has an upcoming DEXA scan in June and I will take a look at that and send her a message after the results return.  If it is normal, we will continue to monitor.  However if it does show a decreased bone density, I would encourage her to increase her calcium supplement and we will check a vitamin D level.  She will plan on following up with her PCP in the fall to discuss a skin check and cervical cancer screenings.      Follow-up: Annual health maintenance review (sooner if needed)    I spent 19 minutes with this patient today. All changes were made via collaborative practice agreement with Matt Callaway MD. A copy of the visit note was provided to the patient's provider(s).    A summary of these recommendations was declined by the patient.    Kaley Enriquez), Pharm.D., BCACP   Medication Therapy Management Pharmacist      Medication Therapy Recommendations  No medication therapy recommendations to display

## 2023-05-26 ENCOUNTER — VIRTUAL VISIT (OUTPATIENT)
Dept: PHARMACY | Facility: CLINIC | Age: 53
End: 2023-05-26

## 2023-05-26 DIAGNOSIS — B00.9 HERPES SIMPLEX VIRUS INFECTION: Primary | ICD-10-CM

## 2023-05-26 DIAGNOSIS — K50.00 CROHN'S DISEASE OF SMALL INTESTINE WITHOUT COMPLICATION (H): Primary | ICD-10-CM

## 2023-05-26 PROCEDURE — 99207 PR NO CHARGE LOS: CPT | Performed by: PHARMACIST

## 2023-05-26 RX ORDER — ACYCLOVIR 400 MG/1
400 TABLET ORAL EVERY 8 HOURS
Qty: 15 TABLET | Refills: 2 | Status: SHIPPED | OUTPATIENT
Start: 2023-05-26 | End: 2023-12-14

## 2023-05-26 NOTE — TELEPHONE ENCOUNTER
I met with patient today to review her health maintenance while on immunosuppression (on Stelara). She reports that lately she has been having more cold sores and would like a medication to use PRN on hand. She has used acyclovir in the past and found it helpful.     Please send rx if you are in agreement (order pended)     Thanks!     Kaley Cain, Pharm.D., [facility]   Medication Therapy Management Pharmacist

## 2023-06-01 ENCOUNTER — PRE VISIT (OUTPATIENT)
Dept: UROLOGY | Facility: CLINIC | Age: 53
End: 2023-06-01
Payer: COMMERCIAL

## 2023-06-01 NOTE — TELEPHONE ENCOUNTER
Reason for visit: Symptom check, PFPT follow up      Relevant information: urinary urgency frequency and urgency incontinence    Records/imaging/labs/orders: all records available    Pt called: no need for a call    At Rooming: teresa Dasilva CMA  6/1/2023  8:48 AM

## 2023-06-09 ENCOUNTER — ANCILLARY PROCEDURE (OUTPATIENT)
Dept: BONE DENSITY | Facility: CLINIC | Age: 53
End: 2023-06-09
Attending: INTERNAL MEDICINE
Payer: COMMERCIAL

## 2023-06-09 DIAGNOSIS — K50.80 CROHN'S DISEASE OF BOTH SMALL AND LARGE INTESTINE WITHOUT COMPLICATION (H): ICD-10-CM

## 2023-06-09 PROCEDURE — 77080 DXA BONE DENSITY AXIAL: CPT

## 2023-06-13 ENCOUNTER — TELEPHONE (OUTPATIENT)
Dept: GASTROENTEROLOGY | Facility: CLINIC | Age: 53
End: 2023-06-13
Payer: COMMERCIAL

## 2023-06-13 ENCOUNTER — CARE COORDINATION (OUTPATIENT)
Dept: GASTROENTEROLOGY | Facility: CLINIC | Age: 53
End: 2023-06-13
Payer: COMMERCIAL

## 2023-06-13 DIAGNOSIS — M85.9 LOW BONE DENSITY: Primary | ICD-10-CM

## 2023-06-13 NOTE — TELEPHONE ENCOUNTER
"Received call from patient regarding bone density test response message from Dr. Cain. The patient reports she has been taking the Vitamin D 2000mg and Calcium 500mg per day for several years. The patient reports concerns with the result, as she has been taking the supplements, eats a healthy diet with adequate amounts of calcium and works out regularly. Since she received the result note, she has increased her calcium to 1000mg daily. She also expressed concern that it says she is \"Post Menopausal\" on the exam and she is not. Concerned this means it'll only get worse once she is post menopausal. Dr. Callaway recommended an endocrinology appointment, which has been scheduled but was not able to get in until October. Has another appointment with Dr. Callaway on 6/28. Patient wanted to see if Dr. Cain had anymore recommendations at this time, but stated she would get the vitamin D lab completed in the next couple days.   "

## 2023-06-13 NOTE — TELEPHONE ENCOUNTER
----- Message from Ruperto Cain MD sent at 6/13/2023 12:00 PM CDT -----  Regarding: Help make sure pt gets vit D lab  Hi Jay    For this patient with low bone density:    1. Needs Vit D lab [facility]    2. Start vitamin D 2000 international units daily (if not already)    3. Calcium 600 mg daily (if not on already and has no history of kidney stones)    You can see my result note to patient for bone density scan      J

## 2023-06-13 NOTE — TELEPHONE ENCOUNTER
Called pt to discuss DEXA results.    Reassured patient.    Low bone density is usually treated with calcium and vit D and exercise.    I am fine with her seeing endocrinology.    Treat IBD as we are    All questions answered.    Ruperto Cain MD

## 2023-06-15 ENCOUNTER — LAB (OUTPATIENT)
Dept: LAB | Facility: CLINIC | Age: 53
End: 2023-06-15
Payer: COMMERCIAL

## 2023-06-15 DIAGNOSIS — M85.9 LOW BONE DENSITY: ICD-10-CM

## 2023-06-15 PROCEDURE — 82306 VITAMIN D 25 HYDROXY: CPT | Performed by: INTERNAL MEDICINE

## 2023-06-15 PROCEDURE — 36415 COLL VENOUS BLD VENIPUNCTURE: CPT | Performed by: PATHOLOGY

## 2023-06-16 LAB — DEPRECATED CALCIDIOL+CALCIFEROL SERPL-MC: 37 UG/L (ref 20–75)

## 2023-06-27 NOTE — PROGRESS NOTES
HPI: Ms. Lopez is a 51 y/o female last seen by me 10/20/22 with a past medical hx of ulcerative colitis who comes to clinic today with a few concerns regarding  (1) recent bone density results,  (2) worsening R knee pain for 1 week, (3) incident involving vision change in L eye in mid-May. She is also interested in referral for massage therapy to help relieve tension in her neck and shoulders, and help with managing anxiety around going to the dentist. Recent DEXA 6/9/23 showed most negativeT score -2.3 R femur neck and -2.0 L femur neck. She clarifies she is not post-menopausal, there is hx of osteoporosis in her family, with her mother having most negative T score of -2.7 in same R hip. She is wondering what she can do to help with bone health while awaiting appointment with endocrinology 10/11/23. For her knee pain, she has a history of B knee pain intermittently since age 24, she was referred to orthopedics 7/25/22 and started PT in July and August 2022 but discontinued after few sessions as she did not notice benefit. Over the last week, she notes increased pain in R knee, with sharp pain on lateral R knee and dull, aching pain in medial R knee, with some pain occasionally radiating to posterior knee. Walking down stairs exacerbates this as well as impact (running), and lunges can increase pain in posterior knee. She has also noticed occasional pain with movements in bed and getting out of bed, and some swelling that comes and goes. She is wondering what more can be done to more specifically target source of knee pain. For the vision change in L eye, she had one incident of jagged, flashing line of light in L eye in mid May 2023 while coming back home from trip abroad. She saw line of light with eye open and closed and incident lasted for one hour and ultimately resolved; no headache, slurring speech, or facial muscle weakness associated with this. She has not had any similar incident since; there is hx of  brain aneurysm in family (grandmother).     No past medical history on file.  Past Surgical History:   Procedure Laterality Date     COLONOSCOPY N/A 10/18/2021    Procedure: COLONOSCOPY, WITH BIOPSY;  Surgeon: Ruperto Cain MD;  Location: [facility] OR     COLONOSCOPY N/A 1/9/2023    Procedure: COLONOSCOPY;  Surgeon: Ruperto Cain MD;  Location: [facility] OR     PE:    Vitals noted, gen, nad, cooperative, alert. Heart sounds normal S1 S2 w/ regular rate and rhythm. B knees without swelling or erythema. Pain in R knee noted with R leg flexion.    A/P:     1.  Chron's Disease;  Off  mesalamine and she remains on  ustekinumab. Follows closely with Dr. Cain. Last visit 4/27/2023, next visit 4/18/2023.  Colonoscopy done through Dr. Cain on 10/18/2021. Last colonoscopy 1/9/2023.  GI Pharmacy note 5/26/2023. She has 10/31/2023 GI follow up with Dr. Haynes.   2.  Mammogram; done 7/21/2022.   3. Vaccinations; Prevnar 20 done 7/11/2022 due to pt taking ustekinumab. COVID Pfizer x 6 (bi-valent 9/9/2022 and 4/28/2023). Tdap 12/9/2019.   4.  Skin check;   Appointment with dermatology 10/20/22 with Dr. Jay; will schedule future appointment.  5.  Family hx of high cholesterol; Future fasting lipid panel ordered 7/19/2022 TG's 133, HDL 89 and LDL 94.   6.  Bilateral knee pain; X rays  7/11/22. She was seen 7/25/2022 orthopedics by Dr. Oconnor. Previously tried PT and orthotics; running painful for her, but rides bike. Ordered today R knee MRI.    Impression:  1. Left: Moderate grade patellofemoral osteoarthritic changes, mild  degenerative changes of the lateral compartment.  2. Right: Mild patellofemoral osteoarthritic changes    8. Bone Health; Vitamin D done 37 on 6/15/2023. Endocrine appt. With Dr. Esquivel 10/11/2023. DEXA scan 6/9/2023 Most negative T score of - 2.3. She will follow with Dr. Esquivel for changes or additions to medications.  9. Urology appt. With Dr. Maki 12/15/2022 for urinary incontinence.    10. Vision change L eye; advised see ophthalmologist for now, send [employer] message if further symptoms.    11. Dental anxiety-Ordered ativan for anxiety related to dental procedures; advised take one (1) 0.5mg tablet before going to the dentist.   12. Neck and shoulder pain: Ordered referral for PT massage therapy for tension in neck and shoulders.     40 minutes spent on the date of the encounter doing chart review, history and exam, documentation and further activities as noted above exclusive of procedures and other billable interpretations    This note scribed by Amy Garcia, MS1    Staff note; I personally reviewed Ms. Lopez's past medical history. I interviewed her and conducted a physical examination. I agree with the above assessment and plan    TOMASZ Callaway MD

## 2023-06-28 ENCOUNTER — OFFICE VISIT (OUTPATIENT)
Dept: INTERNAL MEDICINE | Facility: CLINIC | Age: 53
End: 2023-06-28
Attending: INTERNAL MEDICINE
Payer: COMMERCIAL

## 2023-06-28 VITALS
HEIGHT: 63 IN | HEART RATE: 62 BPM | SYSTOLIC BLOOD PRESSURE: 115 MMHG | BODY MASS INDEX: 22.25 KG/M2 | DIASTOLIC BLOOD PRESSURE: 75 MMHG | OXYGEN SATURATION: 100 % | WEIGHT: 125.6 LBS

## 2023-06-28 DIAGNOSIS — F41.9 ANXIETY: ICD-10-CM

## 2023-06-28 DIAGNOSIS — M54.2 NECK PAIN: ICD-10-CM

## 2023-06-28 DIAGNOSIS — G89.29 CHRONIC PAIN OF RIGHT KNEE: Primary | ICD-10-CM

## 2023-06-28 DIAGNOSIS — M25.561 CHRONIC PAIN OF RIGHT KNEE: Primary | ICD-10-CM

## 2023-06-28 PROCEDURE — 99215 OFFICE O/P EST HI 40 MIN: CPT | Performed by: INTERNAL MEDICINE

## 2023-06-28 RX ORDER — LORAZEPAM 0.5 MG/1
TABLET ORAL
Qty: 4 TABLET | Refills: 0 | Status: SHIPPED | OUTPATIENT
Start: 2023-06-28 | End: 2023-10-06

## 2023-07-13 ENCOUNTER — ANCILLARY PROCEDURE (OUTPATIENT)
Dept: MRI IMAGING | Facility: CLINIC | Age: 53
End: 2023-07-13
Attending: INTERNAL MEDICINE
Payer: COMMERCIAL

## 2023-07-13 DIAGNOSIS — G89.29 CHRONIC PAIN OF RIGHT KNEE: ICD-10-CM

## 2023-07-13 DIAGNOSIS — F41.9 ANXIETY: ICD-10-CM

## 2023-07-13 DIAGNOSIS — M25.561 CHRONIC PAIN OF RIGHT KNEE: ICD-10-CM

## 2023-07-13 DIAGNOSIS — M54.2 NECK PAIN: ICD-10-CM

## 2023-07-13 PROCEDURE — 73721 MRI JNT OF LWR EXTRE W/O DYE: CPT | Mod: RT | Performed by: RADIOLOGY

## 2023-07-19 ENCOUNTER — OFFICE VISIT (OUTPATIENT)
Dept: INTERNAL MEDICINE | Facility: CLINIC | Age: 53
End: 2023-07-19
Payer: COMMERCIAL

## 2023-07-19 VITALS
BODY MASS INDEX: 22.16 KG/M2 | SYSTOLIC BLOOD PRESSURE: 113 MMHG | HEIGHT: 63 IN | TEMPERATURE: 98.7 F | DIASTOLIC BLOOD PRESSURE: 76 MMHG | OXYGEN SATURATION: 100 % | HEART RATE: 62 BPM | WEIGHT: 125.1 LBS

## 2023-07-19 DIAGNOSIS — M25.561 RIGHT KNEE PAIN, UNSPECIFIED CHRONICITY: Primary | ICD-10-CM

## 2023-07-19 DIAGNOSIS — H57.89 EYE IRRITATION: ICD-10-CM

## 2023-07-19 PROCEDURE — 99214 OFFICE O/P EST MOD 30 MIN: CPT | Performed by: INTERNAL MEDICINE

## 2023-07-19 NOTE — NURSING NOTE
Ceci Lopez is a 52 year old female patient that presents today in clinic for the following:    Chief Complaint   Patient presents with     RECHECK     Review knee MRI, care coordination      The patient's allergies and medications were reviewed as noted. A set of vitals were recorded as noted without incident. The patient does not have any other questions for the provider.    Juan Gore, EMT at 3:15 PM on 7/19/2023

## 2023-07-19 NOTE — PROGRESS NOTES
HPI  We saw Ceci Lopez today for a follow-up for R knee pain last seen here 23.   Ceci states her pain has remained since we last saw her, and has pain in her L knee as well, but R is worse. She has the most pain, including sharp, stabbing pain in the lateral R knee, and has some dull aching pain in medial knee and behind patella which sometimes radiates toward posterior knee as well. She exercises regularly and notes increased pain with exercises like lunges and running. Recent MRI on R knee (23) resulted in the followin. Concern for osteochondral fracture involving the lateral trochlea  with associated bone marrow edema.     2. 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     3. Incidental benign appearing lesion at the lateral femoral  metadiaphysis measuring up to 2.2 cm, favored to represent an  enchondroma.     4. Tendinopathy with mild to moderate intrasubstance tearing of the  biceps femoris and fibular collateral ligament sprain.     5. Mild grade sprain of the medial collateral ligament.     6. Anterior and posterior cruciate ligaments are intact. No meniscal  Tear.    In addition to concerns about her knee, Ceci also states she's been having R eye irritation/pain over the last two days that is now resolving, but did have eyelid swelling, redness, and watery eyes. She wears contacts normally but wore glasses yesterday and today. Since we last saw her, she's also found a dentist that's been able to help with some anxiety around dental procedures, and she has a cleaning planned in August.     No past medical history on file.    Past Surgical History:   Procedure Laterality Date     COLONOSCOPY N/A 10/18/2021    Procedure: COLONOSCOPY, WITH BIOPSY;  Surgeon: Ruperto Cain MD;  Location: [facility] OR     COLONOSCOPY N/A 2023    Procedure: COLONOSCOPY;  Surgeon: Ruperto Cain MD;  Location: [facility] OR     BP  "113/76 (BP Location: Right arm, Patient Position: Sitting, Cuff Size: Adult Regular)   Pulse 62   Temp 98.7  F (37.1  C) (Oral)   Ht 1.6 m (5' 2.99\")   Wt 56.7 kg (125 lb 1.6 oz)   SpO2 100%   BMI 22.17 kg/m      PE:   Vitals noted, cooperative, alert. B knees without swelling or erythema. Sclera appear white, pupils round, equal, reactive to light, minor discoloration of eyelid.     A/P:  1. Bilateral knee pain; R knee osteochondral fracture shown in MRI generally consistent with presentation and history, will refer to orthopedics to understand treatment options.   2. R Eye irritation/pain--possible irritation from contact lenses, will refer to ophthalmology.  3. Crohn's Disease; off mesalamine, remains on ustekinumab. Follows with Dr. Cain, next visit 4/18/2023. Last colonoscopy 1//9/2023. GI pharmacy note 5/26/2023. She has 10/31/2023 GI follow up with Dr. Haynes.  4. Mammogram; done 7/21/22  5. Vaccinations; Prevnar 20 done 7/11/2022 due to pt taking ustekinumab. COVID Pfizer x6 (bi-valent 9/9/2022 and 4/28/2023). Tdap 12/9/2019.  6. Skin check; Appt w/ dermatology 10/20/22 w/ Dr. Jay, will schedule future appt.   7. Family hx of high cholesterol; Future fasting lipid panel ordered, 7/19/22 TGs 133, HDL 89, LDL 94.   8. Bone Health: Vitamin D done 37 on 6/15/23. DEXA scan 6/9/23 most negative T score -2.3. She will follow wiha Esquivel, appt 10/11/23.   9. Urology appt with Dr. Maki 12/15/22 for urinary incontinence-tried mirabegron but experienced adverse side effects, will follow with Dr. Maki.   10. Dental anxiety- seeing new dentist, has cleaning scheduled for August 11. Neck and shoulder pain: She will continue with massage therapy.     This note scribed by Amy Garcia, MS2    Staff note;     I personally reviewed Ms. Lopez's past medical history. We personally reviewed her MRI study with Dr. Ellermann, [facility] today. I interviewed her and conducted a physical examination. I agree " with Ms. Garcia's above assessment and plan.    TOMASZ Callaway MD

## 2023-07-20 ENCOUNTER — MYC MEDICAL ADVICE (OUTPATIENT)
Dept: INTERNAL MEDICINE | Facility: CLINIC | Age: 53
End: 2023-07-20
Payer: COMMERCIAL

## 2023-07-20 ENCOUNTER — TELEPHONE (OUTPATIENT)
Dept: INTERNAL MEDICINE | Facility: CLINIC | Age: 53
End: 2023-07-20
Payer: COMMERCIAL

## 2023-07-20 NOTE — TELEPHONE ENCOUNTER
LVM for patient regarding referral and scheduling for a Comprehensive Eye Exam -Per Matt Callaway MD. Provided Eye Clinic number for scheduling options.

## 2023-07-24 ENCOUNTER — TELEPHONE (OUTPATIENT)
Dept: OPHTHALMOLOGY | Facility: CLINIC | Age: 53
End: 2023-07-24
Payer: COMMERCIAL

## 2023-07-24 NOTE — TELEPHONE ENCOUNTER
Spoke with patient regarding rescheduling for sooner appointment from the wait-list. Rescheduled patient as offered and patient is aware of date, time and location.-Per Patient

## 2023-07-26 ENCOUNTER — OFFICE VISIT (OUTPATIENT)
Dept: OPHTHALMOLOGY | Facility: CLINIC | Age: 53
End: 2023-07-26
Attending: INTERNAL MEDICINE
Payer: COMMERCIAL

## 2023-07-26 DIAGNOSIS — T15.01XA CORNEAL FOREIGN BODY, RIGHT, INITIAL ENCOUNTER: Primary | ICD-10-CM

## 2023-07-26 DIAGNOSIS — H57.89 EYE IRRITATION: ICD-10-CM

## 2023-07-26 DIAGNOSIS — M25.561 RIGHT KNEE PAIN, UNSPECIFIED CHRONICITY: ICD-10-CM

## 2023-07-26 PROCEDURE — 92002 INTRM OPH EXAM NEW PATIENT: CPT | Performed by: OPTOMETRIST

## 2023-07-26 RX ORDER — NEOMYCIN POLYMYXIN B SULFATES AND DEXAMETHASONE 3.5; 10000; 1 MG/ML; [USP'U]/ML; MG/ML
SUSPENSION/ DROPS OPHTHALMIC
Qty: 5 ML | Refills: 1 | Status: SHIPPED | OUTPATIENT
Start: 2023-07-26 | End: 2023-10-06

## 2023-07-26 ASSESSMENT — REFRACTION_WEARINGRX
OS_CYLINDER: SPHERE
OS_AXIS: 170
OD_SPHERE: -1.75
OD_SPHERE: +1.50
SPECS_TYPE: SVL
OD_CYLINDER: SPHERE
OS_CYLINDER: +0.25
OS_SPHERE: -2.25
OD_CYLINDER: +0.25
SPECS_TYPE: SVL
OS_SPHERE: +1.50
OD_AXIS: 170

## 2023-07-26 ASSESSMENT — CONF VISUAL FIELD
OS_INFERIOR_TEMPORAL_RESTRICTION: 0
OD_SUPERIOR_TEMPORAL_RESTRICTION: 0
OS_SUPERIOR_TEMPORAL_RESTRICTION: 0
OD_INFERIOR_TEMPORAL_RESTRICTION: 0
OD_SUPERIOR_NASAL_RESTRICTION: 0
OD_INFERIOR_NASAL_RESTRICTION: 0
METHOD: COUNTING FINGERS
OS_NORMAL: 1
OD_NORMAL: 1
OS_SUPERIOR_NASAL_RESTRICTION: 0
OS_INFERIOR_NASAL_RESTRICTION: 0

## 2023-07-26 ASSESSMENT — SLIT LAMP EXAM - LIDS
COMMENTS: NORMAL
COMMENTS: NORMAL

## 2023-07-26 ASSESSMENT — TONOMETRY
IOP_METHOD: ICARE
OS_IOP_MMHG: 17
OD_IOP_MMHG: 17

## 2023-07-26 ASSESSMENT — VISUAL ACUITY
OD_CC+: -1
CORRECTION_TYPE: GLASSES
OS_CC: 20/20
OD_CC: 20/20
METHOD: SNELLEN - LINEAR

## 2023-07-26 ASSESSMENT — EXTERNAL EXAM - RIGHT EYE: OD_EXAM: NORMAL

## 2023-07-26 ASSESSMENT — CUP TO DISC RATIO
OD_RATIO: 0.2
OS_RATIO: 0.2

## 2023-07-26 ASSESSMENT — REFRACTION_MANIFEST
OD_AXIS: 170
OD_SPHERE: -2.00
OS_CYLINDER: +0.25
OD_CYLINDER: +0.25
OS_AXIS: 170
OS_SPHERE: -2.25

## 2023-07-26 ASSESSMENT — EXTERNAL EXAM - LEFT EYE: OS_EXAM: NORMAL

## 2023-07-26 NOTE — NURSING NOTE
Chief Complaints and History of Present Illnesses   Patient presents with    Annual Eye Exam     Pt here for annual eye exam.      Chief Complaint(s) and History of Present Illness(es)       Annual Eye Exam              Laterality: both eyes    Associated symptoms: eye pain and redness    Comments: Pt here for annual eye exam.               Comments    Pt is overall happy with contacts. She notes she had some irritation with right eye - about 1 week ago- she is unsure if she had a scratch or it was the contact that caused it. Pt notes she had tearing and light sensitivity. Overall vision is stable.      Hui Alan, COA on 7/26/2023 at 1:03 PM

## 2023-07-26 NOTE — PROGRESS NOTES
Assessment/Plan  (T15.01XA) Corneal foreign body, right, initial encounter  (primary encounter diagnosis)  Comment: Small defect present consistent with foreign body divot  Plan: neomycin-polymixin-dexamethasone (MAXITROL) 0.1        % ophthalmic suspension        Recommend using Maxitrol three times daily for 1 week. Follow up in clinic after that time for complete exam. Return to clinic with any acute changes to vision or comfort.     Complete documentation of historical and exam elements from today's encounter can  be found in the full encounter summary report (not reduplicated in this progress  note). I personally obtained the chief complaint(s) and history of present illness. I  confirmed and edited as necessary the review of systems, past medical/surgical  history, family history, social history, and examination findings as documented by  others; and I examined the patient myself. I personally reviewed the relevant tests,  images, and reports as documented above. I formulated and edited as necessary the  assessment and plan and discussed the findings and management plan with the  patient and family.    Felipe Turner, OD

## 2023-07-31 NOTE — CONFIDENTIAL NOTE
RECORDS RECEIVED FROM: internal    DATE RECEIVED: 10.11.23   NOTES (FOR ALL VISITS) STATUS DETAILS   OFFICE NOTES from referring provider internal  Matt Callaway MD     MEDICATION LIST internal     IMAGING      DEXASCAN internal  6.9.23     LABS     DIABETES: HBGA1C, CREATININE, FASTING LIPIDS, MICROALBUMIN URINE, POTASSIUM, TSH, T4    THYROID: TSH, T4, CBC, THYRODLONULIN, TOTAL T3, FREE T4, CALCITONIN, CEA internal  Vitamin D 6.15.23  Cbc- 4.24.23  Lipid- 7.19.22

## 2023-08-23 ENCOUNTER — TELEPHONE (OUTPATIENT)
Dept: ORTHOPEDICS | Facility: CLINIC | Age: 53
End: 2023-08-23
Payer: COMMERCIAL

## 2023-08-23 ASSESSMENT — ENCOUNTER SYMPTOMS
JOINT SWELLING: 0
NECK PAIN: 0
MUSCLE WEAKNESS: 0
MUSCLE CRAMPS: 0
MYALGIAS: 0
ARTHRALGIAS: 1
BACK PAIN: 0
STIFFNESS: 0

## 2023-08-23 NOTE — TELEPHONE ENCOUNTER
From: Katerin Abad LPN   Sent: 8/15/2023  11:24 AM CDT   To: Matt Vargas MD; [facility]     Jeanette, Dr. Vargas asked for this lady to get moved up on Dr. Kathleen schedule. I have not been able to work her in yet. I just had him review for urgency and he wants her seen for the lesion first. This is copied from the MRI       3. Incidental benign appearing lesion at the lateral femoral   metadiaphysis measuring up to 2.2 cm, favored to represent an   enchondroma.       Our ortho Oncology doctors need a referral placed.     Katerin

## 2023-08-24 NOTE — TELEPHONE ENCOUNTER
DIAGNOSIS: RT Knee - Lesion   APPOINTMENT DATE: 08/30/2023   NOTES STATUS DETAILS   OFFICE NOTE from referring provider Internal 07/19/2023, 06/28/2023 - Matt Callaway MD - [facility] Internal Med   OFFICE NOTE from other specialist Internal 07/25/2022 - Cristian Oconnor MD - [facility] Sports Med   MEDICATION LIST Internal    DEXA PACS Internal:  06/09/2023 - Hips/Spine   MRI PACS Internal:  07/13/2023 - RT Knee   XRAYS (IMAGES & REPORTS) PACS Internal:  07/11/2022 - Bilateral Knee

## 2023-08-30 ENCOUNTER — PRE VISIT (OUTPATIENT)
Dept: ORTHOPEDICS | Facility: CLINIC | Age: 53
End: 2023-08-30

## 2023-08-30 ENCOUNTER — OFFICE VISIT (OUTPATIENT)
Dept: ORTHOPEDICS | Facility: CLINIC | Age: 53
End: 2023-08-30
Payer: COMMERCIAL

## 2023-08-30 VITALS — HEIGHT: 64 IN | BODY MASS INDEX: 21.34 KG/M2 | WEIGHT: 125 LBS

## 2023-08-30 DIAGNOSIS — D16.9 ENCHONDROMA: ICD-10-CM

## 2023-08-30 DIAGNOSIS — M17.11 PATELLOFEMORAL ARTHRITIS OF RIGHT KNEE: ICD-10-CM

## 2023-08-30 PROCEDURE — 99203 OFFICE O/P NEW LOW 30 MIN: CPT | Mod: GC | Performed by: ORTHOPAEDIC SURGERY

## 2023-08-30 NOTE — PROGRESS NOTES
ORTHOPAEDIC SURGERY CLINIC NOTE    CC: Right knee pain/degenerative changes, also with incidental finding of right distal femur bony lesion, presumed enchondroma    HPI: Ceci Lopez is a 52 year old otherwise healthy female who states she has had many years of bilateral knee pain, right greater than left.  However, over the last year she has noted her right knee has become more more painful.  Patient had x-ray and MRI by her PCP which showed degenerative changes, significant night patellofemoral and lateral compartments.  She was referred to arthroplasty surgeon.  However, patient was also noted to have a bony lesion in the distal femur, presumed enchondroma.  Patient was also referred to the orthopedic tumor clinic to ensure this is a benign lesion before undergoing any further arthroplasty work-up.    Patient states she is extremely active but her right knee has become so painful that she has trouble doing what she loves like riding a bike or running.  States the pain is most significant laterally.  Does not require any assistive devices.  No night sweats, weight loss, fevers, chills, other constitutional symptoms.  Patient denies any numbness or tingling.      PMH:   Ulcerative colitis 08/04/2020     SBO (small bowel obstruction) 08/04/2020        PSH: None    ROS:  Otherwise a 10 point review of systems was conducted and found to be negative.     PE:   A&O, NAD. Pleasant, articulate.  Respirations non-labored on room air.  Focused exam of the right knee demonstrates no gross deformity.  Skin intact.  Mild effusion.  Mild tenderness of the lateral joint line.  Some crepitance with knee range of motion although does have full passive and active range of motion.  Nonantalgic gait.  CMS intact distally.       IMAGING: Review of x-rays of her right knee demonstrates mild to moderate degenerative changes, most significant of total femoral joint.  This is confirmed on MRI which shows cartilage degenerationwhich  appears relatively advanced in the patellofemoral joint.  Also noted to have a enchondroma in the distal femur metaphysis with associated popcorn appearance and cartilage formation.    A/P: Ceci Lpoez 52 year old female with bilateral knee pain, R>L, referred to the orthopedic tumor service to evaluate a right distal femur bony lesion before undergoing further arthroplasty work-up.  Bony lesion consistent with enchondroma. No further work-up or treatment needed for lesion.  Recommend patient follow-up with Dr. Mcfadden to discuss treatment of knee arthritic changes.  Follow-up as needed going forward.      Patient expressed understanding and is in agreement with this plan.      Patient seen and discussed with Dr. Barker.    Claudio Espinosa MD  Orthopaedic Surgery, PGY-4  Pager: [phone]

## 2023-08-30 NOTE — LETTER
8/30/2023         RE: Ceci Lopez  [address] [facility] [address]        Dear Colleague,    Thank you for referring your patient, Ceci Lopez, to the [facility] [place]. Please see a copy of my visit note below.    ORTHOPAEDIC SURGERY CLINIC NOTE    CC: Right knee pain/degenerative changes, also with incidental finding of right distal femur bony lesion, presumed enchondroma    HPI: Ceci Lopez is a 52 year old otherwise healthy female who states she has had many years of bilateral knee pain, right greater than left.  However, over the last year she has noted her right knee has become more more painful.  Patient had x-ray and MRI by her PCP which showed degenerative changes, significant night patellofemoral and lateral compartments.  She was referred to arthroplasty surgeon.  However, patient was also noted to have a bony lesion in the distal femur, presumed enchondroma.  Patient was also referred to the orthopedic tumor clinic to ensure this is a benign lesion before undergoing any further arthroplasty work-up.    Patient states she is extremely active but her right knee has become so painful that she has trouble doing what she loves like riding a bike or running.  States the pain is most significant laterally.  Does not require any assistive devices.  No night sweats, weight loss, fevers, chills, other constitutional symptoms.  Patient denies any numbness or tingling.      PMH:   Ulcerative colitis 08/04/2020     SBO (small bowel obstruction) 08/04/2020        PSH: None    ROS:  Otherwise a 10 point review of systems was conducted and found to be negative.     PE:   A&O, NAD. Pleasant, articulate.  Respirations non-labored on room air.  Focused exam of the right knee demonstrates no gross deformity.  Skin intact.  Mild effusion.  Mild tenderness of the lateral joint line.  Some crepitance with knee range of motion although does have full passive and active range of  motion.  Nonantalgic gait.  CMS intact distally.       IMAGING: Review of x-rays of her right knee demonstrates mild to moderate degenerative changes, most significant of total femoral joint.  This is confirmed on MRI which shows cartilage degenerationwhich appears relatively advanced in the patellofemoral joint.  Also noted to have a enchondroma in the distal femur metaphysis with associated popcorn appearance and cartilage formation.    A/P: Ceci Lopez 52 year old female with bilateral knee pain, R>L, referred to the orthopedic tumor service to evaluate a right distal femur bony lesion before undergoing further arthroplasty work-up.  Bony lesion consistent with enchondroma. No further work-up or treatment needed for lesion.  Recommend patient follow-up with Dr. Mcfadedn to discuss treatment of knee arthritic changes.  Follow-up as needed going forward.      Patient expressed understanding and is in agreement with this plan.      Patient seen and discussed with Dr. Barker.    Claudio Espinosa MD  Orthopaedic Surgery, PGY-4  Pager: [phone]        I was present with the resident during the history and exam.  I discussed the case with the resident and agree with the findings as documented in the assessment and plan.      Felipe Barker MD

## 2023-08-30 NOTE — NURSING NOTE
"Reason For Visit:   Chief Complaint   Patient presents with    Consult     Right knee lesion referred by Dr. aCllaway        Ht 1.62 m (5' 3.78\")   Wt 56.7 kg (125 lb)   BMI 21.60 kg/m      Pain Assessment  Patient Currently in Pain: Yes  0-10 Pain Scale: 4  Primary Pain Location: Knee (right)      Carlos Del Rio ATC    "

## 2023-08-31 DIAGNOSIS — M17.11 PATELLOFEMORAL ARTHRITIS OF RIGHT KNEE: Primary | ICD-10-CM

## 2023-09-02 NOTE — TELEPHONE ENCOUNTER
DIAGNOSIS: Left knee pain, unspecified chronicity [M25.562] / Matt Callaway [place] [facility]     APPOINTMENT DATE: 9.13.23   NOTES STATUS DETAILS   OFFICE NOTE from referring provider Internal 8.30.23  [place]  [facility]   OFFICE NOTE from other specialist Internal 7.19.23, 6.28.23, 7.11.22  [place]  IM    7.25.22  [facility]  [facility]   DISCHARGE SUMMARY from hospital     DISCHARGE REPORT from the ER     OPERATIVE REPORT     MEDICATION LIST Internal    EMG (for Spine)     IMPLANT RECORD/STICKER     LABS     CBC/DIFF Internal 4.24.23   CULTURES     INJECTIONS DONE IN RADIOLOGY     MRI Internal 7.13.23  MR Knee Right   CT SCAN     XRAYS (IMAGES & REPORTS) Internal *sched* for 9.13.23  XR Six Foot Standing Extremities  XR Knee Right    7.11.22  XR Knee Laz   TUMOR     PATHOLOGY  Slides & report

## 2023-09-13 ENCOUNTER — OFFICE VISIT (OUTPATIENT)
Dept: ORTHOPEDICS | Facility: CLINIC | Age: 53
End: 2023-09-13
Payer: COMMERCIAL

## 2023-09-13 ENCOUNTER — ANCILLARY PROCEDURE (OUTPATIENT)
Dept: GENERAL RADIOLOGY | Facility: CLINIC | Age: 53
End: 2023-09-13
Attending: STUDENT IN AN ORGANIZED HEALTH CARE EDUCATION/TRAINING PROGRAM
Payer: COMMERCIAL

## 2023-09-13 ENCOUNTER — PRE VISIT (OUTPATIENT)
Dept: ORTHOPEDICS | Facility: CLINIC | Age: 53
End: 2023-09-13

## 2023-09-13 VITALS — HEIGHT: 64 IN | BODY MASS INDEX: 21.34 KG/M2 | WEIGHT: 125 LBS

## 2023-09-13 DIAGNOSIS — M17.11 PATELLOFEMORAL ARTHRITIS OF RIGHT KNEE: ICD-10-CM

## 2023-09-13 DIAGNOSIS — M17.12 PRIMARY OSTEOARTHRITIS OF LEFT KNEE: ICD-10-CM

## 2023-09-13 DIAGNOSIS — M25.562 LEFT KNEE PAIN, UNSPECIFIED CHRONICITY: ICD-10-CM

## 2023-09-13 DIAGNOSIS — M17.11 PRIMARY OSTEOARTHRITIS OF RIGHT KNEE: Primary | ICD-10-CM

## 2023-09-13 PROCEDURE — 77073 BONE LENGTH STUDIES: CPT | Performed by: STUDENT IN AN ORGANIZED HEALTH CARE EDUCATION/TRAINING PROGRAM

## 2023-09-13 PROCEDURE — 20610 DRAIN/INJ JOINT/BURSA W/O US: CPT | Mod: RT | Performed by: STUDENT IN AN ORGANIZED HEALTH CARE EDUCATION/TRAINING PROGRAM

## 2023-09-13 PROCEDURE — 99214 OFFICE O/P EST MOD 30 MIN: CPT | Mod: 25 | Performed by: STUDENT IN AN ORGANIZED HEALTH CARE EDUCATION/TRAINING PROGRAM

## 2023-09-13 PROCEDURE — 73562 X-RAY EXAM OF KNEE 3: CPT | Mod: RT | Performed by: RADIOLOGY

## 2023-09-13 RX ORDER — TRIAMCINOLONE ACETONIDE 40 MG/ML
40 INJECTION, SUSPENSION INTRA-ARTICULAR; INTRAMUSCULAR
Status: SHIPPED | OUTPATIENT
Start: 2023-09-13

## 2023-09-13 RX ORDER — LIDOCAINE HYDROCHLORIDE 10 MG/ML
7 INJECTION, SOLUTION EPIDURAL; INFILTRATION; INTRACAUDAL; PERINEURAL
Status: SHIPPED | OUTPATIENT
Start: 2023-09-13

## 2023-09-13 RX ADMIN — LIDOCAINE HYDROCHLORIDE 7 ML: 10 INJECTION, SOLUTION EPIDURAL; INFILTRATION; INTRACAUDAL; PERINEURAL at 16:35

## 2023-09-13 RX ADMIN — TRIAMCINOLONE ACETONIDE 40 MG: 40 INJECTION, SUSPENSION INTRA-ARTICULAR; INTRAMUSCULAR at 16:35

## 2023-09-13 NOTE — PROGRESS NOTES
Large Joint Injection/Arthocentesis: R knee joint    Date/Time: 2023 4:35 PM    Performed by: Waldo Mcfadden MD  Authorized by: Waldo Mcfadden MD    Indications:  Pain and osteoarthritis  Needle Size:  22 G  Guidance: landmark guided    Approach:  Anterolateral  Location:  Knee      Medications:  40 mg triamcinolone 40 MG/ML; 7 mL lidocaine (PF) 1 %  Outcome:  Tolerated well, no immediate complications  Procedure discussed: discussed risks, benefits, and alternatives    Consent Given by:  Patient  Timeout: timeout called immediately prior to procedure    Prep: patient was prepped and draped in usual sterile fashion        [facility] ORTHOPEDIC [address]  [address] [facility] [id number] [phone]  Dept: [phone]  ______________________________________________________________________________    Patient: Ceci Lopez   : 1970   MRN: [id number]   2023    INVASIVE PROCEDURE SAFETY CHECKLIST    Date: 2023   Procedure:RIght knee injection   Patient Name: Ceci Lopez  MRN: [id number]  YOB: 1970    Action: Complete sections as appropriate. Any discrepancy results in a HARD COPY until resolved.     PRE PROCEDURE:  Patient ID verified with 2 identifiers (name and  or MRN): Yes  Procedure and site verified with patient/designee (when able): Yes  Accurate consent documentation in medical record: Yes  H&P (or appropriate assessment) documented in medical record: Yes  H&P must be up to 20 days prior to procedure and updates within 24 hours of procedure as applicable: Yes  Relevant diagnostic and radiology test results appropriately labeled and displayed as applicable: Yes  Procedure site(s) marked with provider initials: Yes    TIMEOUT:  Time-Out performed immediately prior to starting procedure, including verbal and active participation of all team members addressing the following:Yes  * Correct patient identify  *  Confirmed that the correct side and site are marked  * An accurate procedure consent form  * Agreement on the procedure to be done  * Correct patient position  * Relevant images and results are properly labeled and appropriately displayed  * The need to administer antibiotics or fluids for irrigation purposes during the procedure as applicable   * Safety precautions based on patient history or medication use    DURING PROCEDURE: Verification of correct person, site, and procedures any time the responsibility for care of the patient is transferred to another member of the care team.       The following medications were given:         Prior to injection, verified patient identity using patient's name and date of birth.  Due to injection administration, patient instructed to remain in clinic for 15 minutes  afterwards, and to report any adverse reaction to me immediately.    Joint injection was performed.    Medication Name: lidocaine 1% NDC [id number]  Drug Amount Wasted:  Yes: 13 mg/ml   Vial/Syringe: Single dose vial  Expiration Date:  10/24    Medication Name kenalog 40mg  NDC [id number]    Scribed by Katerin Abad LPN for Dr. Mcfadden on September 13, 2023 at 4:39pm based on the provider's statements to me.     Katerin Abad LPN

## 2023-09-13 NOTE — PROGRESS NOTES
[facility]  Orthopaedic Surgery Consultation by Waldo Mcfadden M.D.    Ceci Lopez MRN# [id number]   Age: 52 year old YOB: 1970     Requesting physician: Matt Vaughan     Background history:  DX:  Crohn's disease  Bilateral knee OA    TREATMENTS:  Physical Therapy           History of Present Illness:       Ceci is a very pleasant 52-year-old woman who presents to clinic for chronic bilateral knee pain, right greater than left.  She notes pain onset over 20 years ago in her mid 20s.  She has modified her activities to avoid pounding and running and has managed her symptoms over time.  Symptoms of progressively worsened in the last few years.  She initially saw her primary care Dr. Callaway who obtained x-rays.  She did a course of physical therapy and tried Bowman taping but this did not provide much lasting relief.  She is unable to take NSAIDs due to her Crohn's disease so primarily uses Tylenol.    Pain worsened approximately a year ago when she was on a vacation to [facility] and she felt a sharper pain in the superior lateral knee while walking.  This pain has persisted and she was ultimately referred for an MRI which she completed in July of this year.  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 She has been trying to modify activities including cycling, yoga, weightlifting.  She has not been able to bike to work which she and enjoys doing in the past. She has never had an injection into the knees, but had a shoulder injection a few years ago for frozen shoulder and did well with this.     The mass previously found on MRI scan was evaluated by Dr. Barker and was deemed benign.  No further interventions were deemed necessary.    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 Normal mood and affect.  VITAL SIGNS: Height 1.62 m (5' 3.78\"), weight 56.7 kg (125 lb).  Reviewed nursing intake notes.   Body mass index is 21.6 kg/m .  RESP: non labored breathing   ABD: benign, soft, non-tender, no acute peritoneal findings  SKIN: grossly normal   LYMPHATIC: grossly normal, no adenopathy, no extremity edema  NEURO: grossly normal , no motor deficits  VASCULAR: satisfactory perfusion of all extremities   MUSCULOSKELETAL:   Alignment: neutral/varus  Gait: normal  Hips:   L hip: ROM  FF 90 , Extension 0 , IRF 30 , ERF 70 , no pain  R hip: ROM  FF 90 , Extension 0 , IRF 30 , ERF 70 , no pain    Knees:   L knee: -0-0 . Straight leg raise +. No redness, warmth or skin changes present. Effusion No. Ligamentously stable in both ML and AP direction. Normal PF tracking, + crepitus. Apprehension -. Meniscal provocation tests are negative. No tenderness to palpation over the joint line. Pain with patellar grind test. Point tender over superorlateral patellofemoral jointline.     R knee: -0-0 . Straight leg raise +. No redness, warmth or skin changes present. Effusion Yes, trace, no erythema. Ligamentously stable in both ML and AP direction. Normal PF tracking, +crepitus. Apprehension -. Meniscal provocation tests are negative. Tender to palpation both medial and lateral patellofemoral joint.      BLE:   Thigh and leg compartments soft and compressible   No edema present   Skin without rashes or wounds   +Quad/TA/GSC/FHL/EHL   SILT DP/SP/Bryson/Saph/Tib nerve distributions   Palpable dorsalis pedis pulse, 2+           Data:   All laboratory data reviewed  All imaging studies reviewed by me " personally.    XR bilateral knees 7/11/2022 and 9/13/2023:  My interpretation: Bilateral PA view demonstrates relatively well-preserved medial and lateral compartments of both knees.  Right knee lateral, and 20 degree axial of the patella views demonstrate end-stage patellofemoral compartment arthritis primarily involving the lateral facet with osteophyte formation along the lateral patella.    XR alignment films 9/13/2023:   My interpretation: Long-leg standing films demonstrate approximately 4 degrees of valgus on the right, 7 degrees of valgus on the left.    Previous MRI of the right knee dated 7/13/2023 was reviewed.  This demonstrates presence of effusion.  Tricompartmental chondromalacia, primarily affecting the patellofemoral joint.. Presence of mass within the lateral femoral metadiaphysis. No meniscal tear. Edema within the lateral trochlea. Tendinopathy of the biceps femoris.          Assessment and Plan:   Assessment:  52-year-old female presenting with chronic bilateral knee pain due to osteoarthritic changes of bilateral patellofemoral joints.  On the right knee possibly exacerbation due to osteoarthritic changes of lateral compartment with in differential diagnosis subchondral fracture lateral femoral condyle.     Plan:  I had a long discussion with the patient regarding etiology and ongoing management options.  Reviewed surgical and nonsurgical treatments.  The non-surgical options include activity modification, pain medication, PT, bracing and injection therapy. As for surgery we discussed the options of partial and total knee replacement surgery.     Given patient's current clinical presentation it would be my recommendation to optimize nonsurgical treatment of the right knee which seems to be most bothersome now by means of intra-articular injection, lateral  brace and physical therapy.  We discussed that if this proves to be insufficient one could consider surgical intervention.  Patient  understands and agrees to treatment plan as set forth.  After obtaining informed consent the right knee was injected without complications.  We will follow-up with patient on an as-needed basis.    More information on joint replacements can be found on : [email or web address]        Thank you for your referral.    Waldo Mcfadden MD, PhD     Adult Reconstruction  [facility] Department of Orthopaedic Surgery    This note was created using dictation software and may contain errors.  Please contact the creator for any clarifications that are needed.    DATA for DOCUMENTATION:         Past Medical History:     Patient Active Problem List   Diagnosis    Crohn's disease (H)    Pelvic floor dysfunction     No past medical history on file.    Also see scanned health assessment forms.       Past Surgical History:     Past Surgical History:   Procedure Laterality Date    COLONOSCOPY N/A 10/18/2021    Procedure: COLONOSCOPY, WITH BIOPSY;  Surgeon: Ruperto Cain MD;  Location: [facility] OR    COLONOSCOPY N/A 1/9/2023    Procedure: COLONOSCOPY;  Surgeon: Ruperto Cain MD;  [facility] OR            Social History:     Social History     Socioeconomic History    Marital status:      Spouse name: Not on file    Number of children: Not on file    Years of education: Not on file    Highest education level: Not on file   Occupational History    Not on file   Tobacco Use    Smoking status: Never     Passive exposure: Never    Smokeless tobacco: Never   Vaping Use    Vaping Use: Never used   Substance and Sexual Activity    Alcohol use: Not on file    Drug use: Not on file    Sexual activity: Not on file   Other Topics Concern    Not on file   Social History Narrative    Not on file     Social Determinants of Health     Financial Resource Strain: Not on file   Food Insecurity: Not on file   Transportation Needs: Not on file    Physical Activity: Not on file   Stress: Not on file   Social Connections: Not on file   Intimate Partner Violence: Not on file   Housing Stability: Not on file            Family History:       Family History   Problem Relation Age of Onset    Skin Cancer No family hx of     Melanoma No family hx of             Medications:     Current Outpatient Medications   Medication Sig    CALCIUM CITRATE PO Take 1,000 mg by mouth daily    ustekinumab (STELARA) 90 MG/ML INJECT THE CONTENTS OF 1 SYRINGE UNDER THE SKIN EVERY 8 WEEKS    Vitamin D3 (CHOLECALCIFEROL) 25 mcg (1000 units) tablet Take 25 mcg by mouth daily    acyclovir (ZOVIRAX) 400 MG tablet Take 1 tablet (400 mg) by mouth every 8 hours for 5 days    calcium citrate and vitamin D (CITRACAL) 200-250 MG-UNIT TABS per tablet Take 2 tablets by mouth daily (Patient not taking: Reported on 6/28/2023)    LORazepam (ATIVAN) 0.5 MG tablet Take 1 pill before dental work    neomycin-polymixin-dexamethasone (MAXITROL) 0.1 % ophthalmic suspension Instill one drop in right eye three times daily for 7 days     No current facility-administered medications for this visit.              Review of Systems:   A comprehensive 10 point review of systems (constitutional, ENT, cardiac, peripheral vascular, lymphatic, respiratory, GI, , Musculoskeletal, skin, Neurological) was performed and found to be negative except as described in this note.     See intake form completed by patient

## 2023-09-13 NOTE — LETTER
9/13/2023         RE: Ceci Lopez  [address] [place] [id number]        Dear Colleague,    Thank you for referring your patient, Ceci Lopez, to the [facility] [place]. Please see a copy of my visit note below.        [facility]  Orthopaedic Surgery Consultation by Waldo Mcfadden M.D.    Ceci Lopez MRN# [id number]   Age: 52 year old YOB: 1970     Requesting physician: Matt Vaughan     Background history:  DX:  Crohn's disease  Bilateral knee OA    TREATMENTS:  Physical Therapy           History of Present Illness:       Ceci is a very pleasant 52-year-old woman who presents to clinic for chronic bilateral knee pain, right greater than left.  She notes pain onset over 20 years ago in her mid 20s.  She has modified her activities to avoid pounding and running and has managed her symptoms over time.  Symptoms of progressively worsened in the last few years.  She initially saw her primary care Dr. Callaway who obtained x-rays.  She did a course of physical therapy and tried Bowman taping but this did not provide much lasting relief.  She is unable to take NSAIDs due to her Crohn's disease so primarily uses Tylenol.    Pain worsened approximately a year ago when she was on a vacation to [facility] and she felt a sharper pain in the superior lateral knee while walking.  This pain has persisted and she was ultimately referred for an MRI which she completed in July of this year.  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 She has been trying to modify activities including cycling, yoga, weightlifting.  She has not been able to bike to work which she and enjoys doing in the past. She has never had an injection into the knees, but had a shoulder injection a few years ago for  "frozen shoulder and did well with this.     The mass previously found on MRI scan was evaluated by Dr. Barker and was deemed benign.  No further interventions were deemed necessary.    Social:   Occupation: Works at [employer] [place] [employer]   Living situation: Lives with  and 2 children   Hobbies / Sports: walking, hiking, biking, weight class, spin classes, yoga     Smoking: No  Alcohol: No  Illicit drug use: No         Physical Exam:     EXAMINATION pertinent findings:   PSYCH: Pleasant, healthy-appearing, alert, oriented x3, cooperative. Normal mood and affect.  VITAL SIGNS: Height 1.62 m (5' 3.78\"), weight 56.7 kg (125 lb).  Reviewed nursing intake notes.   Body mass index is 21.6 kg/m .  RESP: non labored breathing   ABD: benign, soft, non-tender, no acute peritoneal findings  SKIN: grossly normal   LYMPHATIC: grossly normal, no adenopathy, no extremity edema  NEURO: grossly normal , no motor deficits  VASCULAR: satisfactory perfusion of all extremities   MUSCULOSKELETAL:   Alignment: neutral/varus  Gait: normal  Hips:   L hip: ROM  FF 90 , Extension 0 , IRF 30 , ERF 70 , no pain  R hip: ROM  FF 90 , Extension 0 , IRF 30 , ERF 70 , no pain    Knees:   L knee: -0-0 . Straight leg raise +. No redness, warmth or skin changes present. Effusion No. Ligamentously stable in both ML and AP direction. Normal PF tracking, + crepitus. Apprehension -. Meniscal provocation tests are negative. No tenderness to palpation over the joint line. Pain with patellar grind test. Point tender over superorlateral patellofemoral jointline.     R knee: -0-0 . Straight leg raise +. No redness, warmth or skin changes present. Effusion Yes, trace, no erythema. Ligamentously stable in both ML and AP direction. Normal PF tracking, +crepitus. Apprehension -. Meniscal provocation tests are negative. Tender to palpation both medial and lateral patellofemoral joint.      BLE:   Thigh and leg compartments " soft and compressible   No edema present   Skin without rashes or wounds   +Quad/TA/GSC/FHL/EHL   SILT DP/SP/Bryson/Saph/Tib nerve distributions   Palpable dorsalis pedis pulse, 2+           Data:   All laboratory data reviewed  All imaging studies reviewed by me personally.    XR bilateral knees 7/11/2022 and 9/13/2023:  My interpretation: Bilateral PA view demonstrates relatively well-preserved medial and lateral compartments of both knees.  Right knee lateral, and 20 degree axial of the patella views demonstrate end-stage patellofemoral compartment arthritis primarily involving the lateral facet with osteophyte formation along the lateral patella.    XR alignment films 9/13/2023:   My interpretation: Long-leg standing films demonstrate approximately 4 degrees of valgus on the right, 7 degrees of valgus on the left.    Previous MRI of the right knee dated 7/13/2023 was reviewed.  This demonstrates presence of effusion.  Tricompartmental chondromalacia, primarily affecting the patellofemoral joint.. Presence of mass within the lateral femoral metadiaphysis. No meniscal tear. Edema within the lateral trochlea. Tendinopathy of the biceps femoris.          Assessment and Plan:   Assessment:  52-year-old female presenting with chronic bilateral knee pain due to osteoarthritic changes of bilateral patellofemoral joints.  On the right knee possibly exacerbation due to osteoarthritic changes of lateral compartment with in differential diagnosis subchondral fracture lateral femoral condyle.     Plan:  I had a long discussion with the patient regarding etiology and ongoing management options.  Reviewed surgical and nonsurgical treatments.  The non-surgical options include activity modification, pain medication, PT, bracing and injection therapy. As for surgery we discussed the options of partial and total knee replacement surgery.     Given patient's current clinical presentation it would be my recommendation to optimize  nonsurgical treatment of the right knee which seems to be most bothersome now by means of intra-articular injection, lateral  brace and physical therapy.  We discussed that if this proves to be insufficient one could consider surgical intervention.  Patient understands and agrees to treatment plan as set forth.  After obtaining informed consent the right knee was injected without complications.  We will follow-up with patient on an as-needed basis.    More information on joint replacements can be found on : [email or web address]        Thank you for your referral.    Waldo Mcfadden MD, PhD     Adult Reconstruction  [facility] Department of Orthopaedic Surgery    This note was created using dictation software and may contain errors.  Please contact the creator for any clarifications that are needed.    DATA for DOCUMENTATION:         Past Medical History:     Patient Active Problem List   Diagnosis    Crohn's disease (H)    Pelvic floor dysfunction     No past medical history on file.    Also see scanned health assessment forms.       Past Surgical History:     Past Surgical History:   Procedure Laterality Date    COLONOSCOPY N/A 10/18/2021    Procedure: COLONOSCOPY, WITH BIOPSY;  Surgeon: Ruperto Cain MD;  Location: [facility] OR    COLONOSCOPY N/A 1/9/2023    Procedure: COLONOSCOPY;  Surgeon: Ruperto Cain MD;  [place] [facility] [place]            Social History:     Social History     Socioeconomic History    Marital status:      Spouse name: Not on file    Number of children: Not on file    Years of education: Not on file    Highest education level: Not on file   Occupational History    Not on file   Tobacco Use    Smoking status: Never     Passive exposure: Never    Smokeless tobacco: Never   Vaping Use    Vaping Use: Never used   Substance and Sexual Activity    Alcohol use: Not on file    Drug use: Not on  file    Sexual activity: Not on file   Other Topics Concern    Not on file   Social History Narrative    Not on file     Social Determinants of Health     Financial Resource Strain: Not on file   Food Insecurity: Not on file   Transportation Needs: Not on file   Physical Activity: Not on file   Stress: Not on file   Social Connections: Not on file   Intimate Partner Violence: Not on file   Housing Stability: Not on file            Family History:       Family History   Problem Relation Age of Onset    Skin Cancer No family hx of     Melanoma No family hx of             Medications:     Current Outpatient Medications   Medication Sig    CALCIUM CITRATE PO Take 1,000 mg by mouth daily    ustekinumab (STELARA) 90 MG/ML INJECT THE CONTENTS OF 1 SYRINGE UNDER THE SKIN EVERY 8 WEEKS    Vitamin D3 (CHOLECALCIFEROL) 25 mcg (1000 units) tablet Take 25 mcg by mouth daily    acyclovir (ZOVIRAX) 400 MG tablet Take 1 tablet (400 mg) by mouth every 8 hours for 5 days    calcium citrate and vitamin D (CITRACAL) 200-250 MG-UNIT TABS per tablet Take 2 tablets by mouth daily (Patient not taking: Reported on 6/28/2023)    LORazepam (ATIVAN) 0.5 MG tablet Take 1 pill before dental work    neomycin-polymixin-dexamethasone (MAXITROL) 0.1 % ophthalmic suspension Instill one drop in right eye three times daily for 7 days     No current facility-administered medications for this visit.              Review of Systems:   A comprehensive 10 point review of systems (constitutional, ENT, cardiac, peripheral vascular, lymphatic, respiratory, GI, , Musculoskeletal, skin, Neurological) was performed and found to be negative except as described in this note.     See intake form completed by patient       Large Joint Injection/Arthocentesis: R knee joint    Date/Time: 9/13/2023 4:35 PM    Performed by: Waldo Mcfadden MD  Authorized by: Waldo Mcfadden MD    Indications:  Pain and osteoarthritis  Needle Size:  22 G  Guidance: landmark guided     Approach:  Anterolateral  Location:  Knee      Medications:  40 mg triamcinolone 40 MG/ML; 7 mL lidocaine (PF) 1 %  Outcome:  Tolerated well, no immediate complications  Procedure discussed: discussed risks, benefits, and alternatives    Consent Given by:  Patient  Timeout: timeout called immediately prior to procedure    Prep: patient was prepped and draped in usual sterile fashion        [facility] ORTH[facility] [address]  4TH FLOOR  [facility] [address] [phone]  Dept: [phone]  ______________________________________________________________________________    Patient: Ceci Lopez   : 1970   MRN: [id number]   2023    INVASIVE PROCEDURE SAFETY CHECKLIST    Date: 2023   Procedure:RIght knee injection   Patient Name: Ceci Lopez  MRN: [id number]  YOB: 1970    Action: Complete sections as appropriate. Any discrepancy results in a HARD COPY until resolved.     PRE PROCEDURE:  Patient ID verified with 2 identifiers (name and  or MRN): Yes  Procedure and site verified with patient/designee (when able): Yes  Accurate consent documentation in medical record: Yes  H&P (or appropriate assessment) documented in medical record: Yes  H&P must be up to 20 days prior to procedure and updates within 24 hours of procedure as applicable: Yes  Relevant diagnostic and radiology test results appropriately labeled and displayed as applicable: Yes  Procedure site(s) marked with provider initials: Yes    TIMEOUT:  Time-Out performed immediately prior to starting procedure, including verbal and active participation of all team members addressing the following:Yes  * Correct patient identify  * Confirmed that the correct side and site are marked  * An accurate procedure consent form  * Agreement on the procedure to be done  * Correct patient position  * Relevant images and results are properly labeled and appropriately displayed  * The  need to administer antibiotics or fluids for irrigation purposes during the procedure as applicable   * Safety precautions based on patient history or medication use    DURING PROCEDURE: Verification of correct person, site, and procedures any time the responsibility for care of the patient is transferred to another member of the care team.       The following medications were given:         Prior to injection, verified patient identity using patient's name and date of birth.  Due to injection administration, patient instructed to remain in clinic for 15 minutes  afterwards, and to report any adverse reaction to me immediately.    Joint injection was performed.    Medication Name: lidocaine 1% NDC [id number]  Drug Amount Wasted:  Yes: 13 mg/ml   Vial/Syringe: Single dose vial  Expiration Date:  10/24    Medication Name kenalog 40mg  NDC [id number]    Scribed by Katerin Abad LPN for Dr. Mcfadden on September 13, 2023 at 4:39pm based on the provider's statements to me.     MANA Simmons MD

## 2023-09-15 ENCOUNTER — THERAPY VISIT (OUTPATIENT)
Dept: PHYSICAL THERAPY | Facility: CLINIC | Age: 53
End: 2023-09-15
Attending: STUDENT IN AN ORGANIZED HEALTH CARE EDUCATION/TRAINING PROGRAM
Payer: COMMERCIAL

## 2023-09-15 ENCOUNTER — TELEPHONE (OUTPATIENT)
Dept: PHYSICAL THERAPY | Facility: CLINIC | Age: 53
End: 2023-09-15

## 2023-09-15 DIAGNOSIS — M17.10 PATELLOFEMORAL ARTHRITIS: Primary | ICD-10-CM

## 2023-09-15 DIAGNOSIS — M25.562 LEFT KNEE PAIN, UNSPECIFIED CHRONICITY: ICD-10-CM

## 2023-09-15 DIAGNOSIS — M17.11 PRIMARY OSTEOARTHRITIS OF RIGHT KNEE: ICD-10-CM

## 2023-09-15 PROCEDURE — 97161 PT EVAL LOW COMPLEX 20 MIN: CPT | Mod: GP | Performed by: PHYSICAL THERAPIST

## 2023-09-15 ASSESSMENT — ACTIVITIES OF DAILY LIVING (ADL)
WALK: ACTIVITY IS MINIMALLY DIFFICULT
LIMPING: THE SYMPTOM AFFECTS MY ACTIVITY SLIGHTLY
PAIN: THE SYMPTOM AFFECTS MY ACTIVITY MODERATELY
STIFFNESS: THE SYMPTOM AFFECTS MY ACTIVITY MODERATELY
KNEE_ACTIVITY_OF_DAILY_LIVING_SCORE: 65.71
GIVING WAY, BUCKLING OR SHIFTING OF KNEE: THE SYMPTOM AFFECTS MY ACTIVITY MODERATELY
SIT WITH YOUR KNEE BENT: ACTIVITY IS NOT DIFFICULT
HOW_WOULD_YOU_RATE_THE_OVERALL_FUNCTION_OF_YOUR_KNEE_DURING_YOUR_USUAL_DAILY_ACTIVITIES?: NEARLY NORMAL
RISE FROM A CHAIR: ACTIVITY IS SOMEWHAT DIFFICULT
RAW_SCORE: 46
GO UP STAIRS: ACTIVITY IS MINIMALLY DIFFICULT
GO DOWN STAIRS: ACTIVITY IS SOMEWHAT DIFFICULT
WEAKNESS: THE SYMPTOM AFFECTS MY ACTIVITY SLIGHTLY
AS_A_RESULT_OF_YOUR_KNEE_INJURY,_HOW_WOULD_YOU_RATE_YOUR_CURRENT_LEVEL_OF_DAILY_ACTIVITY?: NEARLY NORMAL
STAND: ACTIVITY IS NOT DIFFICULT
HOW_WOULD_YOU_RATE_THE_CURRENT_FUNCTION_OF_YOUR_KNEE_DURING_YOUR_USUAL_DAILY_ACTIVITIES_ON_A_SCALE_FROM_0_TO_100_WITH_100_BEING_YOUR_LEVEL_OF_KNEE_FUNCTION_PRIOR_TO_YOUR_INJURY_AND_0_BEING_THE_INABILITY_TO_PERFORM_ANY_OF_YOUR_USUAL_DAILY_ACTIVITIES?: 90
SQUAT: ACTIVITY IS SOMEWHAT DIFFICULT
KNEEL ON THE FRONT OF YOUR KNEE: ACTIVITY IS MINIMALLY DIFFICULT
KNEE_ACTIVITY_OF_DAILY_LIVING_SUM: 46
SWELLING: THE SYMPTOM AFFECTS MY ACTIVITY SLIGHTLY

## 2023-09-15 NOTE — PROGRESS NOTES
PHYSICAL THERAPY EVALUATION  Type of Visit: Evaluation    See electronic medical record for Abuse and Falls Screening details.    Subjective       Presenting condition or subjective complaint: I'm experiencing chronic knee pain and am supposed to be fitted with a brace that redistributes the weight on the right leg.  Date of onset:      Relevant medical history:     Dates & types of surgery: None    Prior diagnostic imaging/testing results: MRI; X-ray     Prior therapy history for the same diagnosis, illness or injury: No    Had a CSI at right knee with good sx relief.  Had PT in the past and things were good for a while post.  Increase in sxs insidiously 23 leading to MD and imaging    Living Environment  Social support: With a significant other or spouse   Type of home: [facility]   Stairs to enter the home: Yes 8 Is there a railing: Yes   Ramp: No   Stairs inside the home: Yes 45 Is there a railing: Yes   Help at home: None  Equipment owned:       Employment: Yes College   Hobbies/Interests: Walking, yoga, biking, strength training, nutrition, travel  Have not biked outside this summer because of sxs.  Can spin an hour a couple times a weekoga once a week.  Weight training 2 times a week-body pump-modifies lunges.  Patient goals for therapy: Pain free activities (walking, biking, squats, yoga)  Bike to work goal for commute 25 minutes.  Pain assessment: Pain present right lateral pain sharp, medial diffuse stiffness and swollen feeling.     Objective     Ossur Knee  Brace Trial:     Pain Rating    Affected Joint: right knee  PT trialed a medium brace-did not have a small in clinic.  Pt reported it felt better from a sx relief standpoint, but felt big and a little sloppy.  Pt will benefit from a right knee lateral  brace size small for home use.    Without  brace:    At rest 0/10  Walkin/10    Squattin/10   Stairs: 3/10      Wearing  brace:    At rest  0/10  Walkin/10    Squattin/10   Stairs: 2/10      Brace and Measurements:    Brace trialed:     Type - Ossur  One  Size - medium-pt should have a small for home  Side - Lateral  Affected Knee - Right Knee    Circumference 6 inches below mid patella: 13 inch inches (Ossur  One/X)    Other Comments:  See PT evaluation in [facility] for any additional information including joint special testing/ligament testing       Assessment & Plan   CLINICAL IMPRESSIONS  Medical Diagnosis:      Treatment Diagnosis:     Impression/Assessment: Patient is a 52 year old female with right knee lateral bone bruise, OA complaints.  The following significant findings have been identified: Pain, Decreased ROM/flexibility, Decreased strength, and Decreased activity tolerance. These impairments interfere with their ability to perform self care tasks, work tasks, and recreational activities as compared to previous level of function.   PT trialed a brace and will benefit from the brace but in a smaller size than what was in clinic.  Clinical Decision Making (Complexity):  Clinical Presentation: Stable/Uncomplicated  Clinical Presentation Rationale: based on medical and personal factors listed in PT evaluation  Clinical Decision Making (Complexity): Low complexity    PLAN OF CARE  Treatment Interventions:  Interventions: Therapeutic Activity, Therapeutic Exercise    Long Term Goals            Frequency of Treatment:    Duration of Treatment:      Recommended Referrals to Other Professionals:  none  Education Assessment:        Risks and benefits of evaluation/treatment have been explained.   Patient/Family/caregiver agrees with Plan of Care.     Evaluation Time:             Signing Clinician: Cachorro Quintero, PT

## 2023-09-21 DIAGNOSIS — K50.80 CROHN'S DISEASE OF BOTH SMALL AND LARGE INTESTINE WITHOUT COMPLICATION (H): ICD-10-CM

## 2023-09-25 NOTE — TELEPHONE ENCOUNTER
STELARA 90 MG/ML  Last Written Prescription Date:  12/1/22  Last Fill Quantity: 1ML,   # refills: 4  Last Office Visit : 4/27/23  Future Office visit:  10/11/23  Routing refill request to provider for review/approval because:  REFILL USE GAP   DOES GI WANT TO CONTINUE / REFILL MED     CBC RESULTS:   Recent Labs   Lab Test 04/24/23  1546   WBC 5.5   RBC 3.85   HGB 11.9   HCT 36.1   MCV 94   MCH 30.9   MCHC 33.0   RDW 12.3        CRP Inflammation   Date Value Ref Range Status   04/24/2023 <3.00 <5.00 mg/L Final      Erythrocyte Sedimentation Rate   Date Value Ref Range Status   04/24/2023 14 0 - 30 mm/hr Final

## 2023-09-27 RX ORDER — USTEKINUMAB 90 MG/ML
INJECTION, SOLUTION SUBCUTANEOUS
Qty: 1 ML | Refills: 1 | Status: SHIPPED | OUTPATIENT
Start: 2023-09-27 | End: 2023-10-09

## 2023-09-27 NOTE — TELEPHONE ENCOUNTER
Follow up appointments scheduled:   10/31 with Eduar Haynes  4/18 with Dr. Cain    Patient is noted to be overdue for labs. Instructed patient via [employer] to complete.     Also placed MTM consult to assist with managing Stelara. Updated patient via [employer].

## 2023-10-03 ENCOUNTER — LAB (OUTPATIENT)
Dept: LAB | Facility: CLINIC | Age: 53
End: 2023-10-03
Payer: COMMERCIAL

## 2023-10-03 DIAGNOSIS — K50.80 CROHN'S DISEASE OF BOTH SMALL AND LARGE INTESTINE WITHOUT COMPLICATION (H): ICD-10-CM

## 2023-10-03 LAB
BASO+EOS+MONOS # BLD AUTO: NORMAL 10*3/UL
BASO+EOS+MONOS NFR BLD AUTO: NORMAL %
BASOPHILS # BLD AUTO: 0 10E3/UL (ref 0–0.2)
BASOPHILS NFR BLD AUTO: 1 %
EOSINOPHIL # BLD AUTO: 0 10E3/UL (ref 0–0.7)
EOSINOPHIL NFR BLD AUTO: 1 %
ERYTHROCYTE [DISTWIDTH] IN BLOOD BY AUTOMATED COUNT: 12.1 % (ref 10–15)
ERYTHROCYTE [SEDIMENTATION RATE] IN BLOOD BY WESTERGREN METHOD: 16 MM/HR (ref 0–30)
HCT VFR BLD AUTO: 37.9 % (ref 35–47)
HGB BLD-MCNC: 12.4 G/DL (ref 11.7–15.7)
IMM GRANULOCYTES # BLD: 0 10E3/UL
IMM GRANULOCYTES NFR BLD: 0 %
LYMPHOCYTES # BLD AUTO: 1.5 10E3/UL (ref 0.8–5.3)
LYMPHOCYTES NFR BLD AUTO: 33 %
MCH RBC QN AUTO: 31 PG (ref 26.5–33)
MCHC RBC AUTO-ENTMCNC: 32.7 G/DL (ref 31.5–36.5)
MCV RBC AUTO: 95 FL (ref 78–100)
MONOCYTES # BLD AUTO: 0.3 10E3/UL (ref 0–1.3)
MONOCYTES NFR BLD AUTO: 7 %
NEUTROPHILS # BLD AUTO: 2.6 10E3/UL (ref 1.6–8.3)
NEUTROPHILS NFR BLD AUTO: 59 %
PLATELET # BLD AUTO: 264 10E3/UL (ref 150–450)
RBC # BLD AUTO: 4 10E6/UL (ref 3.8–5.2)
WBC # BLD AUTO: 4.4 10E3/UL (ref 4–11)

## 2023-10-03 PROCEDURE — 80076 HEPATIC FUNCTION PANEL: CPT

## 2023-10-03 PROCEDURE — 36415 COLL VENOUS BLD VENIPUNCTURE: CPT

## 2023-10-03 PROCEDURE — 85652 RBC SED RATE AUTOMATED: CPT

## 2023-10-03 PROCEDURE — 85025 COMPLETE CBC W/AUTO DIFF WBC: CPT

## 2023-10-03 PROCEDURE — 86140 C-REACTIVE PROTEIN: CPT

## 2023-10-04 LAB
ALBUMIN SERPL BCG-MCNC: 4.4 G/DL (ref 3.5–5.2)
ALP SERPL-CCNC: 56 U/L (ref 35–104)
ALT SERPL W P-5'-P-CCNC: 35 U/L (ref 0–50)
AST SERPL W P-5'-P-CCNC: 28 U/L (ref 0–45)
BILIRUB DIRECT SERPL-MCNC: <0.2 MG/DL (ref 0–0.3)
BILIRUB SERPL-MCNC: 0.2 MG/DL
CRP SERPL-MCNC: <3 MG/L
PROT SERPL-MCNC: 7.3 G/DL (ref 6.4–8.3)

## 2023-10-04 ASSESSMENT — ENCOUNTER SYMPTOMS
MYALGIAS: 0
BACK PAIN: 0
NECK PAIN: 0
STIFFNESS: 0
MUSCLE CRAMPS: 0
MUSCLE WEAKNESS: 0
ARTHRALGIAS: 1
JOINT SWELLING: 0

## 2023-10-04 NOTE — TELEPHONE ENCOUNTER
Bed: 11  Expected date: 10/4/23  Expected time: 1:40 PM  Means of arrival: [facility] [facility] [employer]  Comments:  84yo M from clinic   Difficulty breathing 73% on room air   Hx copd and recent pneumonia dx   117/73 60 22 96% 2L NC      VM left for patient explaining that Dr. Mcfadden was wanting her to see one of our other providers before the visit with him. Direct line was left to call to discuss and set up appointment.    Isis Womack LPN

## 2023-10-06 ENCOUNTER — VIRTUAL VISIT (OUTPATIENT)
Dept: GASTROENTEROLOGY | Facility: CLINIC | Age: 53
End: 2023-10-06
Attending: INTERNAL MEDICINE
Payer: COMMERCIAL

## 2023-10-06 DIAGNOSIS — K50.80 CROHN'S DISEASE OF BOTH SMALL AND LARGE INTESTINE WITHOUT COMPLICATION (H): Primary | ICD-10-CM

## 2023-10-06 NOTE — PROGRESS NOTES
Medication Therapy Management (MTM) Encounter    ASSESSMENT:                            Medication Adherence/Access: No issues identified    Crohn's Disease:  Ceci would benefit from continued treatment of her Crohn's disease with Stelara 90 mg every 8 weeks. She is up to date on routine safety labs. Standing labs were updated as they were set to  before next draw. She is indicated for a routine hepatitis C screening and this was ordered. She is up to date on vaccinations. She is not getting adequate calcium from diet and supplementation, and optimization was recommended. I recommend her to follow-up with her PCP and dermatology regarding routine cancer screenings. Education was provided on OTC products which may cause flares. Ceci has planned travel to [facility]. No clinically significant drug-drug interactions with potential first-line antimalarial chemoprophylaxis regimens are present. A referral was placed to discuss yellow fever vaccination in the setting of immunosuppression.       PLAN:                            You are currently up to date on maintenance labs. I will add a hepatitis C screening to next labs to complete at your convenience (around 2024). I've sent additional refills of Stelara to your pharmacy.   I've put in a referral to the travel clinic for you to discuss your travel to [facility]. This will include a discussion of travel vaccines, precautions to protect against mosquitos, antimalarial prophylaxis and medical waiver for yellow vaccine (if pursued).   Consider increasing your calcium supplementation. Try increasing to 1 serving twice daily with food. As we discussed, you can try moving your iron supplement to dinner and taking your calcium citrate at bedtime.   I recommend discussing with your PCP how often you should have routine cervical cancer screenings. The recommendation while on immunosuppression is annually.  Your dermatologist wanted to see you again around 10/20/23. You  "can call [phone] to schedule.   Take acetaminophen (brand name: Tylenol) for mild aches and pain. Avoid NSAIDs, which include aspirin, ibuprofen (Advil, Motrin), and naproxen (Aleve, Naprosyn).     Follow-up: MT visit in 3-6 months    SUBJECTIVE/OBJECTIVE:                          Ceci Lopez is a 52 year old female contacted via secure video for a follow-up visit from 23 with my colleague Kaley Cain, PharmD. This is my first visit with her.       Reason for visit: Stelara + IBD health maintenance.    Allergies/ADRs: Reviewed in chart  Past Medical History: Reviewed in chart  Tobacco: She reports that she has never smoked. She has never been exposed to tobacco smoke. She has never used smokeless tobacco.  Alcohol: none      Medication Adherence/Access: no issues reported    Crohn's Disease:   Stelara 90 mg every 8 weeks    She administers her Stelara on Saturday every 8 weeks. Note on calendar to order meds. No injection site reactions. No other side effects. Symptoms are consistent with her last visit with Dr. Cain.     She did mention she has international travel to [facility]. She asked me about antimalarial prophylaxis as well as yellow fever vaccination. I did perform a drug interaction screening on potential antimalarials. I informed her that she would likely not be able to get the yellow fever vaccination and recommended that she discuss this with a travel medicine provider.     Last provider visit: 23 Ruperto Cain MD  Next provider visit: 10/31/23 Eduar Haynes PA-C, 2024 with Dr. Cain  Last labs completed: 10/3/2023  Lab frequency: every 3 months   - standing labs hepatic panel, CRP, ESR, CBC with platelets and differential,  2023   Next labs due: 2024    PDC: 94%    Last seen 23 with Ruperto Cain with assessment/plan:   \"1. Crohn's ileocolitis - now in endoscopic remission on ustekinumab. Great news!     -continue ustekinumab q 8 weeks  -continue maintenance " "labs every 3 months  -repeat colonoscopy due 2025\"    IBD Health Care Maintenance:    Vaccinations:  All patients on biologics should avoid live vaccines.    -- Influenza (every year) received 2023  -- TdaP (every 10 years) 12/2019  -- Pneumococcal Pneumonia    Prevnar-13: 7/2018   Pneumovax-23: 9/2018   Prevnar-20: 7/2022  -- COVID-19 4/2021, 4/2021, 9/2021, 2/2022, 9/2022 (bivalent), 9/2023 (updated)  -- Yearly assessment for latent Tb (verbal screening and exam, PPD or QuantiFERON-Tb testing)      result: negative 4/2023    One time confirmation of immunity or serologies:  -- Hepatitis A (serologies or immunizations) 3/2009, 12/2019  -- Hepatitis B (serologies or immunizations) 6/1995, 12/1995, 3/2003  -- Varicella/Zoster    Varicella   Zoster- 4/2023, 9/2023  -- MMR- not on file  -- HPV (all aged 18-26)- not on file  -- Meningococcal meningitis (all patients at risk for meningitis)-- Menactra (3/2009)    Due to the immunosuppression in this patient, I would not advise administration of live vaccines such as varicella/VZV, intranasal influenza, MMR, or yellow fever vaccine (if traveling).      Pre-Biologic Screening:  -- Hep B Surface Antibody serologies indicate immunity 8/25/21  -- Hep B Surface Antigen non-reactive 8/25/21  -- Hep B Core Antibody non-reactive 8/25/21  -- Hep C Antibody not on file     Bone mineral density screening   -- Recommend all patients supplement with calcium and vitamin D  -- Given prior steroid use recommend DEXA if not already done- last DEXA showed low bone density 6/9/2023; vitamin D level normal; recommendation on imaging report was to consider repeat in 2 years; Dr. Cain recommended Vitamin D 2000 units daily + calcium 600 mg daily    Cancer Screening:  Colon cancer screening:  Per Dr. Cain 4/27/23: \"Given colonic involvement, recommend patient undergo regular dysplasia surveillance   Next dysplasia screening is recommended 2025.\"    Cervical cancer screening: Annual due to " immunosupression    Skin cancer screening: Annual visual exam of skin by dermatologist since patient is immunocompromised    Depression Screening:  -- Over the last month, have you felt down, depressed, or hopeless? No   -- Over the last month, have you felt little interest or pleasure doing things? No     Misc:  -- Avoid tobacco use  -- Avoid NSAIDs as there is potentially a 25% chance of causing an IBD flare- acetaminophen (Tylenol), ibuprofen (Advil)     ----------------      I spent 32 minutes with this patient today. All changes were made via collaborative practice agreement with Ruperto Cain MD. A copy of the visit note was provided to the patient's provider(s).    A summary of these recommendations was sent via [employer].    Miladys LewisD, BCPS  [facility] Pharmacist   [facility]  Phone: [phone]    Telemedicine Visit Details  Type of service:  Video Conference via [employer]  Start Time:  9:52 AM  End Time:  10:24 AM     Medication Therapy Recommendations  No medication therapy recommendations to display

## 2023-10-06 NOTE — Clinical Note
10/6/2023         RE: Ceci Lopez  [address] [facility] [address]        Dear Colleague,    Thank you for referring your patient, Ceci Lopez, to the [facility]. Please see a copy of my visit note below.    Medication Therapy Management (MTM) Encounter    ASSESSMENT:                            Medication Adherence/Access: {adherencechoices:155003}    Crohn's Disease:  ***      PLAN:                            Up to date on labs. Next labs are due around January 2024. Update standing labs.   Will add hepatitis C screening to next labs to complete at your convenience (around January 2024)  Vitamin D and calcium  Calcium citrate 2 caplets 500 mg once daily -> increase to twice daily  Iron supplement - B12  Blood builder - ferrous bisglycinate 26 mg once daily, folic and B12  Yellow fever vaccinations- Travelling to [facility]   Monday touch base on what she can do  Also look deeper into malaria prophylaxis  Try iron with dinner and second dose of calcium at bedtime.  PCP- routine cervical  Provide phone number to follow-up with derm   Take acetaminophen (brand name: Tylenol) for mild aches and pain. Avoid NSAIDs, which include aspirin, ibuprofen (Advil, Motrin), and naproxen (Aleve, Naprosyn).     Follow-up: MTM     SUBJECTIVE/OBJECTIVE:                          Ceci Lopez is a 52 year old female { :760252} for {mtmvisit:386284}     Reason for visit: Stelara + IBD health maintenance.    Allergies/ADRs: Reviewed in chart  Past Medical History: Reviewed in chart  Tobacco: She reports that she has never smoked. She has never been exposed to tobacco smoke. She has never used smokeless tobacco.  Alcohol: none      Medication Adherence/Access: {fumedadherence:329377}    Crohn's Disease:   Stelara 90 mg every 8 weeks    Saturday every 8 weeks. Note on calendar to order meds. No injection site site reactions. No other side effects.     Last provider visit: 4/27/23 Ruperto Cain  "MD  Next provider visit: 10/31/23 Eduar Haynes PA-C, 2024 with Dr. Cain  Last labs completed: 10/3/2023  Lab frequency: every 3 months   - standing labs hepatic panel, CRP, ESR, CBC with platelets and differential,  2023   Next labs due: 2024    PDC: 94%    Last seen 23 with Ruperto Cain with assessment/plan:   \"1. Crohn's ileocolitis - now in endoscopic remission on ustekinumab. Great news!     -continue ustekinumab q 8 weeks  -continue maintenance labs every 3 months  -repeat colonoscopy due \"    IBD Health Care Maintenance:    Vaccinations:  All patients on biologics should avoid live vaccines.    -- Influenza (every year) received   -- TdaP (every 10 years) 2019  -- Pneumococcal Pneumonia    Prevnar-13: 2018   Pneumovax-23: 2018   Prevnar-20: 2022  -- COVID-19 2021, 2021, 2021, 2022, 2022 (bivalent), 2023 (updated)  -- Yearly assessment for latent Tb (verbal screening and exam, PPD or QuantiFERON-Tb testing)      result: negative 2023    One time confirmation of immunity or serologies:  -- Hepatitis A (serologies or immunizations) 3/2009, 2019  -- Hepatitis B (serologies or immunizations) 1995, 1995, 3/2003  -- Varicella/Zoster    Varicella   Zoster- 2023, 2023  -- MMR- not on file  -- HPV (all aged 18-26)- not on file  -- Meningococcal meningitis (all patients at risk for meningitis)-- Menactra (3/2009)    Due to the immunosuppression in this patient, I would not advise administration of live vaccines such as varicella/VZV, intranasal influenza, MMR, or yellow fever vaccine (if traveling).      Pre-Biologic Screening:  -- Hep B Surface Antibody serologies indicate immunity  -- Hep B Surface Antigen non-reactive  -- Hep B Core Antibody non-reactive  -- Hep C Antibody {ResultsSerologies:400159}    Bone mineral density screening   -- Recommend all patients supplement with calcium and vitamin D  -- Given prior steroid use recommend DEXA if not " "already done- last DEXA showed low bone density 6/9/2023; vitamin D level normal; recommendation on imaging report was to consider repeat in 2 years; Dr. Cain recommended Vitamin D 2000 units daily + calcium 600 mg daily    Cancer Screening:  Colon cancer screening:  Per Dr. Cain 4/27/23: \"Given colonic involvement, recommend patient undergo regular dysplasia surveillance   Next dysplasia screening is recommended 2025.\"    Cervical cancer screening: Annual due to immunosupression    Skin cancer screening: Annual visual exam of skin by dermatologist since patient is immunocompromised    Depression Screening:  -- Over the last month, have you felt down, depressed, or hopeless? No   -- Over the last month, have you felt little interest or pleasure doing things? No     Misc:  -- Avoid tobacco use  -- Avoid NSAIDs as there is potentially a 25% chance of causing an IBD flare- acetaminophen (Tylenol), ibuprofen (Advil)             Today's Vitals: There were no vitals taken for this visit.  ----------------  {ANALISA?:716563}    I spent 32 minutes with this patient today. All changes were made via collaborative practice agreement with Ruperto Cain MD. A copy of the visit note was provided to the patient's provider(s).    A summary of these recommendations {GIVEN/NOT GIVEN:142017}.    ***    Telemedicine Visit Details  Type of service:  Video Conference via [employer]  Start Time:  9:52 AM  End Time:  10:24 AM     Medication Therapy Recommendations  No medication therapy recommendations to display       Medication Therapy Management (MTM) Encounter    ASSESSMENT:                            Medication Adherence/Access: No issues identified    Crohn's Disease:  Ceci would benefit from continued treatment of her Crohn's disease with Stelara 90 mg every 8 weeks. She is up to date on routine safety labs. She is indicated for a routine hepatitis C screening and this was ordered. She is indicated for a few vaccines which were " recommended to her. She is not getting adequate calcium from diet and supplementation, and optimization was recommended. I recommend her to follow-up with her PCP and dermatology regarding routine cancer screenings. Education was provided on OTC products which may cause flares. Ceci has planned travel to [facility]. No clinically significant drug-drug interactions with potential first-line antimalarial chemoprophylaxis regimens are present. A referral was placed to discuss yellow fever vaccination in the setting of immunosuppression.       PLAN:                            You are currently up to date on maintenance labs. I will add a hepatitis C screening to next labs to complete at your convenience (around January 2024). I've sent additional refills of Stelara to your pharmacy.   I've put in a referral to the travel clinic for you to discuss your travel to [facility]. This will include a discussion of travel vaccines, precautions to protect against mosquitos, antimalarial prophylaxis and medical waiver for yellow vaccine (if pursued).   Consider increasing your calcium supplementation. Try increasing to 1 serving twice daily with food. As we discussed, you can try moving your iron supplement to dinner and taking your calcium citrate at bedtime.   I recommend discussing with your PCP how often you should have routine cervical cancer screenings. The recommendation while on immunosuppression is annually.  Your dermatologist wanted to see you again around 10/20/23. You can call [phone] to schedule.   Take acetaminophen (brand name: Tylenol) for mild aches and pain. Avoid NSAIDs, which include aspirin, ibuprofen (Advil, Motrin), and naproxen (Aleve, Naprosyn).     Follow-up: MTM visit in 3-6 months    SUBJECTIVE/OBJECTIVE:                          Ceci Lopez is a 52 year old female contacted via secure video for a follow-up visit from 5/26/23 with my colleague Kaley Cain, PharmD. This is my first visit with her.   "     Reason for visit: Stelara + IBD health maintenance.    Allergies/ADRs: Reviewed in chart  Past Medical History: Reviewed in chart  Tobacco: She reports that she has never smoked. She has never been exposed to tobacco smoke. She has never used smokeless tobacco.  Alcohol: none      Medication Adherence/Access: no issues reported    Crohn's Disease:   Stelara 90 mg every 8 weeks    Saturday every 8 weeks. Note on calendar to order meds. No injection site site reactions. No other side effects. Symptoms are consistent with her last visit with Dr. Cain.     She did mention she has international travel to [facility]. She asked me about antimalarial prophylaxis as well as yellow fever vaccination. I did perform a drug interaction screening on potential antimalarials. I informed her that she would likely not be able to get the yellow fever vaccination and recommended that she discuss this with a travel medicine provider.     Last provider visit: 23 Ruperto Cain MD  Next provider visit: 10/31/23 Eduar Haynes PA-C, 2024 with Dr. Cain  Last labs completed: 10/3/2023  Lab frequency: every 3 months   - standing labs hepatic panel, CRP, ESR, CBC with platelets and differential,  2023   Next labs due: 2024    PDC: 94%    Last seen 23 with Ruperto Cain with assessment/plan:   \"1. Crohn's ileocolitis - now in endoscopic remission on ustekinumab. Great news!     -continue ustekinumab q 8 weeks  -continue maintenance labs every 3 months  -repeat colonoscopy due \"    IBD Health Care Maintenance:    Vaccinations:  All patients on biologics should avoid live vaccines.    -- Influenza (every year) received   -- TdaP (every 10 years) 2019  -- Pneumococcal Pneumonia    Prevnar-13: 2018   Pneumovax-23: 2018   Prevnar-20: 2022  -- COVID-19 2021, 2021, 2021, 2022, 2022 (bivalent), 2023 (updated)  -- Yearly assessment for latent Tb (verbal screening and exam, PPD or " "QuantiFERON-Tb testing)      result: negative 4/2023    One time confirmation of immunity or serologies:  -- Hepatitis A (serologies or immunizations) 3/2009, 12/2019  -- Hepatitis B (serologies or immunizations) 6/1995, 12/1995, 3/2003  -- Varicella/Zoster    Varicella   Zoster- 4/2023, 9/2023  -- MMR- not on file  -- HPV (all aged 18-26)- not on file  -- Meningococcal meningitis (all patients at risk for meningitis)-- Menactra (3/2009)    Due to the immunosuppression in this patient, I would not advise administration of live vaccines such as varicella/VZV, intranasal influenza, MMR, or yellow fever vaccine (if traveling).      Pre-Biologic Screening:  -- Hep B Surface Antibody serologies indicate immunity 8/25/21  -- Hep B Surface Antigen non-reactive 8/25/21  -- Hep B Core Antibody non-reactive 8/25/21  -- Hep C Antibody not on file     Bone mineral density screening   -- Recommend all patients supplement with calcium and vitamin D  -- Given prior steroid use recommend DEXA if not already done- last DEXA showed low bone density 6/9/2023; vitamin D level normal; recommendation on imaging report was to consider repeat in 2 years; Dr. Cain recommended Vitamin D 2000 units daily + calcium 600 mg daily    Cancer Screening:  Colon cancer screening:  Per Dr. Cain 4/27/23: \"Given colonic involvement, recommend patient undergo regular dysplasia surveillance   Next dysplasia screening is recommended 2025.\"    Cervical cancer screening: Annual due to immunosupression    Skin cancer screening: Annual visual exam of skin by dermatologist since patient is immunocompromised    Depression Screening:  -- Over the last month, have you felt down, depressed, or hopeless? No   -- Over the last month, have you felt little interest or pleasure doing things? No     Misc:  -- Avoid tobacco use  -- Avoid NSAIDs as there is potentially a 25% chance of causing an IBD flare- acetaminophen (Tylenol), ibuprofen (Advil) "     ----------------      I spent 32 minutes with this patient today. All changes were made via collaborative practice agreement with Ruperto Cain MD. A copy of the visit note was provided to the patient's provider(s).    A summary of these recommendations was sent via [employer].    Claudio Vera PharmD, BCPS  MT Pharmacist   [facility] Gastroenterology  Phone: [phone]    Telemedicine Visit Details  Type of service:  Video Conference via [employer]  Start Time:  9:52 AM  End Time:  10:24 AM     Medication Therapy Recommendations  No medication therapy recommendations to display         Again, thank you for allowing me to participate in the care of your patient.        Sincerely,        Claudio Vera RPH

## 2023-10-09 RX ORDER — USTEKINUMAB 90 MG/ML
90 INJECTION, SOLUTION SUBCUTANEOUS
Qty: 1 ML | Refills: 2 | Status: SHIPPED | OUTPATIENT
Start: 2023-10-09 | End: 2024-05-07

## 2023-10-10 NOTE — PATIENT INSTRUCTIONS
"Recommendations from today's MTM visit:                                                      You are currently up to date on maintenance labs. I will add a hepatitis C screening to next labs to complete at your convenience (around January 2024). I've sent additional refills of Stelara to your pharmacy.   I've put in a referral to the travel clinic for you to discuss your travel to [place]. This will include a discussion of travel vaccines, precautions to protect against mosquitos, antimalarial prophylaxis and medical waiver for yellow vaccine (if pursued).   Consider increasing your calcium supplementation. Try increasing to 1 serving twice daily with food. As we discussed, you can try moving your iron supplement to dinner and taking your calcium citrate at bedtime.   I recommend discussing with your PCP how often you should have routine cervical cancer screenings. The recommendation while on immunosuppression is annually.  Your dermatologist wanted to see you again around 10/20/23. You can call [phone] to schedule.   Take acetaminophen (brand name: Tylenol) for mild aches and pain. Avoid NSAIDs, which include aspirin, ibuprofen (Advil, Motrin), and naproxen (Aleve, Naprosyn).     Follow-up: MTM visit in 3-6 months    It was great speaking with you today.  I value your experience and would be very thankful for your time in providing feedback in our clinic survey. In the next few days, you may receive an email or text message from [employer] with a link to a survey related to your  clinical pharmacist.\"     To schedule another MTM appointment, please call the clinic directly or you may call the MTM scheduling line at [phone] or toll-free at [phone].     My Clinical Pharmacist's contact information:                                                      Please feel free to contact me with any questions or concerns you have.      Claudio Vera, PharmD, BCPS  MTM Pharmacist   [facility] " Gastroenterology  Phone: [phone]

## 2023-10-11 ENCOUNTER — PRE VISIT (OUTPATIENT)
Dept: ENDOCRINOLOGY | Facility: CLINIC | Age: 53
End: 2023-10-11

## 2023-10-11 ENCOUNTER — VIRTUAL VISIT (OUTPATIENT)
Dept: ENDOCRINOLOGY | Facility: CLINIC | Age: 53
End: 2023-10-11
Attending: INTERNAL MEDICINE
Payer: COMMERCIAL

## 2023-10-11 VITALS — WEIGHT: 120 LBS | BODY MASS INDEX: 20.74 KG/M2

## 2023-10-11 DIAGNOSIS — M85.89 OSTEOPENIA OF MULTIPLE SITES: Primary | ICD-10-CM

## 2023-10-11 PROCEDURE — 99204 OFFICE O/P NEW MOD 45 MIN: CPT | Mod: VID | Performed by: INTERNAL MEDICINE

## 2023-10-11 NOTE — PATIENT INSTRUCTIONS
"Weight bearing Exercises  Fall prevention  Adequate Calcium and vitamin D intake: for maintenance calcium 1200 mg daily from all sources and vitamin D 2000 IU daily.    Orders Placed This Encounter   Procedures    Albumin level    Calcium    Parathyroid Hormone Intact    Phosphorus    Creatinine    Urea nitrogen    TSH with free T4 reflex    Tissue transglutaminase cherry IgA and IgG    Calcium timed urine    Creatinine timed urine    Brief description of what you need to do:  Do the test on a day off, so you can stay home.  Wake up and flush the first urine.  Then collect all the urine for that day, evening and overnight if you wake to urinate. Plus the first urine of the next morning.    For women,  the lab should also give you a \"hat\" which is a device to help collect the urine and to pour it into the collection jug.   The urine must be kept cool, not frozen.  So, do not put it out on the porch.  Keep it in the fridge.    "

## 2023-10-11 NOTE — LETTER
10/11/2023       RE: Ceci Lopez  [address] [facility] [address]     Dear Colleague,    Thank you for referring your patient, Ceci Lopez, to the [facility] [place] [facility]. Please see a copy of my visit note below.    Endocrinology Clinic Visit    Chief Complaint: RECHECK and Video Visit     Information obtained from:Patient      Assessment/Treatment Plan:      Osteopenia at multiple sites    I have personally reviewed DEXA scan from 6/9/2023 and agree with the interpretation of lumbar spine T score -1.5, left femoral neck and right femoral neck T-scores -2 and -2.3 respectively, left and right total hip T score -1.5 and -1.1 respectively.  The FRAX score is 5.7% for major osteoporotic fracture and 1.1% for hip fracture and bone specific therapy is not recommended.  Risk factor for osteoporosis seems to be Crohn's, family history & body weight.  To assess for other secondary causes, screen for celiac disease, hypo or hypercalciuria, parathyroid hormone and other routine labs as documented below.  General management-strengthening exercises, fall prevention, calcium 1200 mg daily from all sources and continue current vitamin D.  Check a follow-up bone density in 1 year.  Test and/or medications prescribed today:  Orders Placed This Encounter   Procedures    Dexa hip/pelvis/spine    Albumin level    Calcium    Parathyroid Hormone Intact    Phosphorus    Creatinine    Urea nitrogen    TSH with free T4 reflex    Tissue transglutaminase cherry IgA and IgG    Calcium timed urine    Creatinine timed urine         Stefanie Esquivel MD  Staff Endocrinologist    Division of Endocrinology and Diabetes      Subjective:         HPI: Ceci Lopez is a 52 year old female with history of osteopenia who is seen in consultation at Matt Callaway's request for the same.  Fracture risk and osteoporosis  Previous fracture after the age  of 50: no   Age >65 - no   Loss of height - no   Low body mass index; weight less than 127 pounds - 120-122. 7-8 years ago lost 65 pounds (cut back on alcohol, less carbs).   Family/Parental history of hip fracture/Osteoporosis: Mom , fall wrist fracture.   Smoking: no smoking    Early menopause: no   Previous fragility fracture, morphometric vertebral fracture:no   Current glucocorticoid treatment: >5 mg prednisone for 3 mo or longer; current or previously; no.  brief steroid for crohn's, short dose.   Excessive Alcohol intake: not right now. In the past has been drinking wine most days of the week.    Falls/risk factors     Fracture hx: no   Chronic glucocorticoid use: no   Previous osteoporosis treatment(s): no   Hx of hypercalcemia: no   Hx of nephrolithiasis: no   Menopause:no   Estrogen Post menopause:      Contraindications to osteoporosis treatment options:             No  history of external beam radiation therapy  Evenity:            No MI or CVA within the last 12 months     No upcoming invasive dental work.    Calcium citrate 500 mg (used to 1 tablet per day; now 2 tablets per day/ no dairy products - oat milk - 8 oz - 12 oz) and vitamin D intake 50 mcg daily   Exercise -  an hour long spinning classes daily, yoga, strengthening exercise, walking   Crohn's - flare up - 5 years ago, May 2018,     Answers submitted by the patient for this visit:  Symptoms you have experienced in the last 30 days (Submitted on 10/4/2023)  General Symptoms: No  Skin Symptoms: No  HENT Symptoms: No  EYE SYMPTOMS: No  HEART SYMPTOMS: No  LUNG SYMPTOMS: No  INTESTINAL SYMPTOMS: No  URINARY SYMPTOMS: No  GYNECOLOGIC SYMPTOMS: No  BREAST SYMPTOMS: No  SKELETAL SYMPTOMS: Yes  BLOOD SYMPTOMS: No  NERVOUS SYSTEM SYMPTOMS: No  MENTAL HEALTH SYMPTOMS: No  Please answer the questions below to tell us what condition you are experiencing: (Submitted on 10/4/2023)  Back pain: No  Muscle aches: No  Neck pain: No  Swollen joints: No  Joint  pain: Yes  Bone pain: No  Muscle cramps: No  Muscle weakness: No  Joint stiffness: No  Bone fracture: No        Allergies   Allergen Reactions    Ibuprofen Other (See Comments)     Causes colitis flare ups.  Patient does not want to take this medication.    Erythromycin      PN: LW Reaction: Vomiting       Current Outpatient Medications   Medication Sig Dispense Refill    CALCIUM CITRATE PO Take 500 mg by mouth daily      FERROUS BISGLYCINATE CHELATE PO Take 1 tablet by mouth [employer] Blood Builder - Vitamin C, B12, Folic acid, Ferrous bisglycinate      ustekinumab (STELARA) 90 MG/ML Inject 1 mL (90 mg) Subcutaneous once every eight weeks 1 mL 2    Vitamin D3 (CHOLECALCIFEROL) 25 mcg (1000 units) tablet Take 50 mcg by mouth daily      acyclovir (ZOVIRAX) 400 MG tablet Take 1 tablet (400 mg) by mouth every 8 hours for 5 days 15 tablet 2       Review of Systems     11 point review system (Constitutional, HENT, Eyes, Respiratory, Cardiovascular, Gastrointestinal, Genitourinary, Musculoskeletal,Neurological, Psychiatric/Behavioural, Endocrine) is negative or is as per HPI above.  Pertinent to the visit past medical history, past surgical history, family history and social history reviewed.  Objective:   Wt 54.4 kg (120 lb)   BMI 20.74 kg/m    GENERAL: Healthy, alert and no distress  EYES: Eyes grossly normal to inspection.  PSYCH: Mentation appears normal, affect normal/bright, judgement and insight intact, normal speech and appearance well-groomed.    In House Labs:     TSH   Date Value Ref Range Status   08/25/2021 0.86 0.40 - 4.00 mU/L Final       Creatinine   Date Value Ref Range Status   05/04/2022 0.80 0.60 - 1.10 mg/dL Final     This note has been dictated using voice recognition software.  As a result, there may be errors in the documentation that have gone undetected.  Please consider this when interpreting information in this documentation.         Video-Visit Details    Type of service:  Video Visit  Joined  the call at 10/11/2023, 10:58:59 am.  Left the call at 10/11/2023, 11:25:44 am.  You were on the call for 26 minutes 45 seconds .    Distant Location (provider location):  Off-site.     Platform used for Video Visit: Kendra Esquivel MD

## 2023-10-11 NOTE — NURSING NOTE
Is the patient currently in the state of [place]? YES    Visit mode:VIDEO    If the visit is dropped, the patient can be reconnected by: VIDEO VISIT: Text to cell phone:   Telephone Information:   Mobile [phone]       Will anyone else be joining the visit? NO  (If patient encounters technical issues they should call [phone] :[id number])    How would you like to obtain your AVS? MyChart    Are changes needed to the allergy or medication list? No    Reason for visit: RECHECK and Video Visit    Carmelita JONES      
18

## 2023-10-11 NOTE — PROGRESS NOTES
Endocrinology Clinic Visit    Chief Complaint: RECHECK and Video Visit     Information obtained from:Patient      Assessment/Treatment Plan:      Osteopenia at multiple sites    I have personally reviewed DEXA scan from 6/9/2023 and agree with the interpretation of lumbar spine T score -1.5, left femoral neck and right femoral neck T-scores -2 and -2.3 respectively, left and right total hip T score -1.5 and -1.1 respectively.  The FRAX score is 5.7% for major osteoporotic fracture and 1.1% for hip fracture and bone specific therapy is not recommended.  Risk factor for osteoporosis seems to be Crohn's, family history & body weight.  To assess for other secondary causes, screen for celiac disease, hypo or hypercalciuria, parathyroid hormone and other routine labs as documented below.  General management-strengthening exercises, fall prevention, calcium 1200 mg daily from all sources and continue current vitamin D.  Check a follow-up bone density in 1 year.  Test and/or medications prescribed today:  Orders Placed This Encounter   Procedures    Dexa hip/pelvis/spine    Albumin level    Calcium    Parathyroid Hormone Intact    Phosphorus    Creatinine    Urea nitrogen    TSH with free T4 reflex    Tissue transglutaminase cherry IgA and IgG    Calcium timed urine    Creatinine timed urine         Stefanie Esquivel MD  Staff Endocrinologist    Division of Endocrinology and Diabetes      Subjective:         HPI: Ceci Lopez is a 52 year old female with history of osteopenia who is seen in consultation at Matt Callaway's request for the same.  Fracture risk and osteoporosis  Previous fracture after the age of 50: no   Age >65 - no   Loss of height - no   Low body mass index; weight less than 127 pounds - 120-122. 7-8 years ago lost 65 pounds (cut back on alcohol, less carbs).   Family/Parental history of hip fracture/Osteoporosis: Mom , fall wrist fracture.   Smoking: no smoking    Early menopause: no   Previous  fragility fracture, morphometric vertebral fracture:no   Current glucocorticoid treatment: >5 mg prednisone for 3 mo or longer; current or previously; no.  brief steroid for crohn's, short dose.   Excessive Alcohol intake: not right now. In the past has been drinking wine most days of the week.    Falls/risk factors     Fracture hx: no   Chronic glucocorticoid use: no   Previous osteoporosis treatment(s): no   Hx of hypercalcemia: no   Hx of nephrolithiasis: no   Menopause:no   Estrogen Post menopause:      Contraindications to osteoporosis treatment options:             No  history of external beam radiation therapy  Evenity:            No MI or CVA within the last 12 months     No upcoming invasive dental work.    Calcium citrate 500 mg (used to 1 tablet per day; now 2 tablets per day/ no dairy products - oat milk - 8 oz - 12 oz) and vitamin D intake 50 mcg daily   Exercise -  an hour long spinning classes daily, yoga, strengthening exercise, walking   Crohn's - flare up - 5 years ago, May 2018,     Answers submitted by the patient for this visit:  Symptoms you have experienced in the last 30 days (Submitted on 10/4/2023)  General Symptoms: No  Skin Symptoms: No  HENT Symptoms: No  EYE SYMPTOMS: No  HEART SYMPTOMS: No  LUNG SYMPTOMS: No  INTESTINAL SYMPTOMS: No  URINARY SYMPTOMS: No  GYNECOLOGIC SYMPTOMS: No  BREAST SYMPTOMS: No  SKELETAL SYMPTOMS: Yes  BLOOD SYMPTOMS: No  NERVOUS SYSTEM SYMPTOMS: No  MENTAL HEALTH SYMPTOMS: No  Please answer the questions below to tell us what condition you are experiencing: (Submitted on 10/4/2023)  Back pain: No  Muscle aches: No  Neck pain: No  Swollen joints: No  Joint pain: Yes  Bone pain: No  Muscle cramps: No  Muscle weakness: No  Joint stiffness: No  Bone fracture: No        Allergies   Allergen Reactions    Ibuprofen Other (See Comments)     Causes colitis flare ups.  Patient does not want to take this medication.    Erythromycin      PN: LW Reaction: Vomiting        Current Outpatient Medications   Medication Sig Dispense Refill    CALCIUM CITRATE PO Take 500 mg by mouth daily      FERROUS BISGLYCINATE CHELATE PO Take 1 tablet by mouth [employer] Blood Builder - Vitamin C, B12, Folic acid, Ferrous bisglycinate      ustekinumab (STELARA) 90 MG/ML Inject 1 mL (90 mg) Subcutaneous once every eight weeks 1 mL 2    Vitamin D3 (CHOLECALCIFEROL) 25 mcg (1000 units) tablet Take 50 mcg by mouth daily      acyclovir (ZOVIRAX) 400 MG tablet Take 1 tablet (400 mg) by mouth every 8 hours for 5 days 15 tablet 2       Review of Systems     11 point review system (Constitutional, HENT, Eyes, Respiratory, Cardiovascular, Gastrointestinal, Genitourinary, Musculoskeletal,Neurological, Psychiatric/Behavioural, Endocrine) is negative or is as per HPI above.  Pertinent to the visit past medical history, past surgical history, family history and social history reviewed.  Objective:   Wt 54.4 kg (120 lb)   BMI 20.74 kg/m    GENERAL: Healthy, alert and no distress  EYES: Eyes grossly normal to inspection.  PSYCH: Mentation appears normal, affect normal/bright, judgement and insight intact, normal speech and appearance well-groomed.    In House Labs:     TSH   Date Value Ref Range Status   08/25/2021 0.86 0.40 - 4.00 mU/L Final       Creatinine   Date Value Ref Range Status   05/04/2022 0.80 0.60 - 1.10 mg/dL Final     This note has been dictated using voice recognition software.  As a result, there may be errors in the documentation that have gone undetected.  Please consider this when interpreting information in this documentation.         Video-Visit Details    Type of service:  Video Visit  Joined the call at 10/11/2023, 10:58:59 am.  Left the call at 10/11/2023, 11:25:44 am.  You were on the call for 26 minutes 45 seconds .    Distant Location (provider location):  Off-site.     Platform used for Video Visit: [employer]

## 2023-10-14 ENCOUNTER — HEALTH MAINTENANCE LETTER (OUTPATIENT)
Age: 53
End: 2023-10-14

## 2023-10-18 ENCOUNTER — TELEPHONE (OUTPATIENT)
Dept: ENDOCRINOLOGY | Facility: CLINIC | Age: 53
End: 2023-10-18

## 2023-10-24 ASSESSMENT — ENCOUNTER SYMPTOMS: ARTHRALGIAS: 1

## 2023-10-26 ENCOUNTER — LAB (OUTPATIENT)
Dept: LAB | Facility: CLINIC | Age: 53
End: 2023-10-26
Payer: COMMERCIAL

## 2023-10-26 DIAGNOSIS — M85.89 OSTEOPENIA OF MULTIPLE SITES: ICD-10-CM

## 2023-10-26 DIAGNOSIS — K50.80 CROHN'S DISEASE OF BOTH SMALL AND LARGE INTESTINE WITHOUT COMPLICATION (H): ICD-10-CM

## 2023-10-26 LAB
HCV AB SERPL QL IA: NONREACTIVE
HOLD SPECIMEN: NORMAL

## 2023-10-26 PROCEDURE — 82565 ASSAY OF CREATININE: CPT

## 2023-10-26 PROCEDURE — 82570 ASSAY OF URINE CREATININE: CPT

## 2023-10-26 PROCEDURE — 82310 ASSAY OF CALCIUM: CPT

## 2023-10-26 PROCEDURE — 86364 TISS TRNSGLTMNASE EA IG CLAS: CPT

## 2023-10-26 PROCEDURE — 83970 ASSAY OF PARATHORMONE: CPT

## 2023-10-26 PROCEDURE — 81050 URINALYSIS VOLUME MEASURE: CPT

## 2023-10-26 PROCEDURE — 36415 COLL VENOUS BLD VENIPUNCTURE: CPT

## 2023-10-26 PROCEDURE — 86803 HEPATITIS C AB TEST: CPT

## 2023-10-26 PROCEDURE — 82340 ASSAY OF CALCIUM IN URINE: CPT

## 2023-10-26 PROCEDURE — 84443 ASSAY THYROID STIM HORMONE: CPT

## 2023-10-26 PROCEDURE — 84520 ASSAY OF UREA NITROGEN: CPT

## 2023-10-26 PROCEDURE — 84100 ASSAY OF PHOSPHORUS: CPT

## 2023-10-27 ENCOUNTER — ANCILLARY PROCEDURE (OUTPATIENT)
Dept: MAMMOGRAPHY | Facility: CLINIC | Age: 53
End: 2023-10-27
Attending: INTERNAL MEDICINE
Payer: COMMERCIAL

## 2023-10-27 DIAGNOSIS — Z12.31 VISIT FOR SCREENING MAMMOGRAM: ICD-10-CM

## 2023-10-27 LAB
BUN SERPL-MCNC: 7.4 MG/DL (ref 6–20)
CALCIUM SERPL-MCNC: 8.6 MG/DL (ref 8.6–10)
CREAT SERPL-MCNC: 0.72 MG/DL (ref 0.51–0.95)
EGFRCR SERPLBLD CKD-EPI 2021: >90 ML/MIN/1.73M2
PHOSPHATE SERPL-MCNC: 4 MG/DL (ref 2.5–4.5)
PTH-INTACT SERPL-MCNC: 35 PG/ML (ref 15–65)
TSH SERPL DL<=0.005 MIU/L-ACNC: 1.19 UIU/ML (ref 0.3–4.2)

## 2023-10-27 PROCEDURE — 77067 SCR MAMMO BI INCL CAD: CPT | Mod: GC | Performed by: STUDENT IN AN ORGANIZED HEALTH CARE EDUCATION/TRAINING PROGRAM

## 2023-10-27 PROCEDURE — 77063 BREAST TOMOSYNTHESIS BI: CPT | Mod: GC | Performed by: STUDENT IN AN ORGANIZED HEALTH CARE EDUCATION/TRAINING PROGRAM

## 2023-10-29 ENCOUNTER — APPOINTMENT (OUTPATIENT)
Dept: LAB | Facility: CLINIC | Age: 53
End: 2023-10-29
Payer: COMMERCIAL

## 2023-10-29 LAB
CALCIUM 24H UR-MRATE: 0.13 G/SPEC (ref 0.1–0.3)
CALCIUM UR-MCNC: 2.4 MG/DL
COLLECT DURATION TIME UR: 24 H
COLLECT DURATION TIME UR: 24 H
CREAT 24H UR-MRATE: 1.09 G/SPEC (ref 0.72–1.51)
CREAT UR-MCNC: 20.2 MG/DL
SPECIMEN VOL UR: 5400 ML
SPECIMEN VOL UR: 5400 ML

## 2023-10-30 LAB
TTG IGA SER-ACNC: 0.7 U/ML
TTG IGG SER-ACNC: 0.9 U/ML

## 2023-10-31 ENCOUNTER — VIRTUAL VISIT (OUTPATIENT)
Dept: GASTROENTEROLOGY | Facility: CLINIC | Age: 53
End: 2023-10-31
Attending: INTERNAL MEDICINE
Payer: COMMERCIAL

## 2023-10-31 DIAGNOSIS — K50.80 CROHN'S DISEASE OF BOTH SMALL AND LARGE INTESTINE WITHOUT COMPLICATION (H): Primary | ICD-10-CM

## 2023-10-31 PROCEDURE — 99214 OFFICE O/P EST MOD 30 MIN: CPT | Mod: VID | Performed by: PHYSICIAN ASSISTANT

## 2023-10-31 ASSESSMENT — PAIN SCALES - GENERAL: PAINLEVEL: NO PAIN (0)

## 2023-10-31 NOTE — LETTER
10/31/2023         RE: Ceci Lpoez  [address] [place] [id number]        Dear Colleague,    Thank you for referring your patient, Ceci Lopez, to the [facility] [place]. Please see a copy of my visit note below.    Virtual Visit Details    Type of service:  Video Visit     Originating Location (pt. Location): Home    Distant Location (provider location):  Off-site  Platform used for Video Visit: [facility]    IBD CLINIC VISIT    CC/REFERRING MD:  Referred Self  REASON FOR CONSULTATION: follow up CD    ASSESSMENT/PLAN:    1. Crohn's ileocolitis - now in endoscopic remission on ustekinumab.     -continue ustekinumab q 8 weeks  -continue maintenance labs every 3 months  -repeat colonoscopy due 2025    IBD HISTORY  Age at diagnosis: 2005  Extent of disease: initially left sided - however most recent colonoscopy shows ileitis and ascending colitis. Biopsies with inflammation throughout colon except descending/sigmoid  Disease phenotype: inflammatory  Ruthy-anal disease: none  Current CD medications:   -Ustekinumab  Prior IBD surgeries: none  Prior IBD Medications:   -Apriso  - other mesalamine  - prednisone 2018    DRUG MONITORING  TPMT enzyme activity: 28 WNL    6-TGN/6-MMPN levels: --    Biologic concentration: --    DISEASE ASSESSMENT  Labs  Recent Labs   Lab Test 10/03/23  1548 04/24/23  1546 12/06/22  1432 05/04/22  1419 08/25/21  1111 08/25/21  1110   CRP  --   --   --  <0.1  --  <2.9   SED 16 14   < > 13  --   --    HGB 12.4 11.9   < > 12.6   < >  --     < > = values in this interval not displayed.     Fecal calprotectin: None  Endoscopy:   August 21, 2020 EGD and Colonoscopy  EGD was normal. Gastric and duodenal biopsies were negative.     Colonoscopy showed 4 aphthous ulcers in the terminal ileum. Some minimal erythema in the distal rectum. Biopsies of the ileum revealed some nonspecific minimally active chornic ileitis possibly due to backwash ileitis per  pathologist. The rectal biopsy showed some minimally active UC, but the remainder of the colon biopsies were negative.      Colonscopy 10/2021 - ulceration in ileum. Biopsies chronic active ileitis. Mild inflammation in ascending colon. More c/w Crohn's.  Enterography: MRE 11/10/21  1. Minimal wall thickening and minimal patchy increased enhancement in  the terminal ileum suggests very mild ileitis. Remainder of the small  bowel is normal.  2. Myomatous uterus.  C diff: None    Colonoscopy 1/2023 - normal TI and colon. SESCD score 0    sIBDQ:   IBDQ Score Date IBDQ - Total Score  (Higher score better)   10/24/2023  11:02 AM 67   4/25/2022   9:02 AM 63   11/17/2021   3:01 PM 63       IBD Health Care Maintenance:    Vaccinations:  All patients on biologics should avoid live vaccines.    -- Influenza (every year)  -- TdaP (every 10 years)  -- Pneumococcal Pneumonia (once plus booster at 5 years)  -- Yearly assessment for latent Tb (verbal screening and exam, PPD or QuantiFERON-Tb testing)    One time confirmation of immunity or serologies:  -- Hepatitis A (serologies or immunizations)  -- Hepatitis B (serologies or immunizations)  -- Varicella  -- MMR  -- HPV (all aged 18-26)  -- Meningococcal meningitis (all patients at risk for meningitis)  -- Due to the immunosuppression in this patient, I would not advise administration of live vaccines such as varicella/VZV, intranasal influenza, MMR, or yellow fever vaccine (if travelling).      Bone mineral density screening   -- Recommend all patients supplement with calcium and vitamin D  -- Given prior steroid use recommend DEXA if not already done - ORDERED    Cancer Screening:  Colon cancer screening:  Given colonic involvement, recommend patient undergo regular dysplasia surveillance   Next dysplasia screening is recommended 2025.    Cervical cancer screening: Annual due to immunosupression    Skin cancer screening: Annual visual exam of skin by dermatologist since patient  is immunocompromised    Depression Screening:  -- Over the last month, have you felt down, depressed, or hopeless? no  -- Over the last month, have you felt little interest or pleasure doing things? no    Misc:  -- Avoid tobacco use  -- Avoid NSAIDs as there is potentially a 25% chance of causing an IBD flare    Return to clinic in 6 months     Thank you for this consultation.  It was a pleasure to participate in the care of this patient; please contact us with any further questions.      Eduar Haynes PA-C  Division of Gastroenterology, Hepatology and Nutrition  [facility]     HPI:   Currently, here for follow up. She has achieved endoscopic and clinical remission since starting stelara.     She continues to do great. Tolerating the Stelara well. No side effects.  No GI symptoms. No abdominal pain, tenesmus, diarrhea, blood in stool. No weight loss. No rashes, joint pains, mouth sores, eye pain, eye redness    She tapered off the apriso without issue last year.     Eats a plant based diet exclusively at least for the last 3-4 years. Low sugar and no processed food. High fiber, drinks a lot of water. Le own soy yogurt.    ROS:    No fevers or chills  No weight loss  No blurry vision, double vision or change in vision  No sore throat  No lymphadenopathy  No headache, paraesthesias, or weakness in a limb  No shortness of breath or wheezing  No chest pain or pressure  No arthralgias or myalgias  No rashes or skin changes  No odynophagia or dysphagia  No BRBPR, hematochezia, melena  No dysuria, frequency or urgency  No hot/cold intolerance or polyria  No anxiety or depression    Extra intestinal manifestations of IBD:  No uveitis/episcleritis  No aphthous ulcers   No arthritis   No erythema nodosum/pyoderma gangrenosum.     PERTINENT PAST MEDICAL HISTORY:  No past medical history on file.    PREVIOUS SURGERIES:  Past Surgical History:   Procedure Laterality Date    COLONOSCOPY N/A 10/18/2021     Procedure: COLONOSCOPY, WITH BIOPSY;  Surgeon: Ke Cain MD;  [place] [facility] [place]    COLONOSCOPY N/A 1/9/2023    Procedure: COLONOSCOPY;  Surgeon: Ke Cain MD;  [place] [facility] [place]       PREVIOUS ENDOSCOPY:  Results for orders placed or performed during the hospital encounter of 01/09/23   COLONOSCOPY   Result Value Ref Range    COLONOSCOPY       [facility]  [address] [place] [address], [place] [address] [phone]     Endoscopy Department  _______________________________________________________________________________  Patient Name: Ceci Lopez            Procedure Date: 1/9/2023 2:19 PM  MRN: [id number]                       Account Number: [id number]  YOB: 1970             Admit Type: Outpatient  Age: 52                               [place] [facility] [place] [facility]  Gender: Female                        Note Status: Finalized  Attending MD: KE CAIN MD  Total Sedation Time:   _______________________________________________________________________________     Procedure:             Colonoscopy  Indications:           Assess therapeutic response to therapy of Crohn's                          disease of the small bowel and colon  Providers:             KE CAIN MD, Mason Franz RN,                          MASON HER  Referring MD:          CITLALI Finney  Requesting Provider:   Matt Callaway MD  Medicines:             Monitored Anesthesia Care  Complications:         No immediate complications.  _______________________________________________________________________________  Procedure:             Pre-Anesthesia Assessment:                         - See EPIC H and P note                         After obtaining informed consent, the colonoscope was                          passed under direct vision. Throughout the procedure,                          the patient's blood pressure, pulse, and oxygen                           saturations were monitored continuously. The                          Colonoscope was introduced through the anus and                          advanced to the terminal ileum, with identification of                          the appendiceal orifice and IC valve. The colonoscopy                          was performed without difficulty. The patient                          tolerated the procedure well. The quality  of the bowel                          preparation was fair.                                                                                   Findings:       The perianal and digital rectal examinations were normal.       The terminal ileum appeared normal.       The entire examined colon appeared normal on direct and retroflexion        views.       The Simple Endoscopic Score for Crohn's Disease was determined based on        the endoscopic appearance of the mucosa in the following segments:       - Ileum: Findings include no ulcers present, no ulcerated surfaces, no        affected surfaces and no narrowings. Segment score: 0.       - Right Colon: Findings include no ulcers present, no ulcerated        surfaces, no affected surfaces and no narrowings. Segment score: 0.       - Transverse Colon: Findings include no ulcers present, no ulcerated        surfaces, no affected surfaces and no narrowings. Segment score: 0.       - Left Colon: Findings include no ulcers present, no ulcerate d surfaces,        no affected surfaces and no narrowings. Segment score: 0.       - Rectum: Findings include no ulcers present, no ulcerated surfaces, no        affected surfaces and no narrowings. Segment score: 0.       - Total SES-CD aggregate score: 0.                                                                                   Impression:            - Preparation of the colon was fair.                         - The examined portion of the ileum was normal.                         - The entire examined  colon is normal on direct and                          retroflexion views.                         - Simple Endoscopic Score for Crohn's Disease: 0,                          mucosal inflammatory changes secondary to Crohn's                          disease, in remission.                         - No specimens collected.  Recommendation:        - Discharge patient to home (with escort).                         - Resume previous diet.                         - Continue present me dications.                         - Repeat colonoscopy in 2 years for surveillance                          (surveillance biopsies were obtained in 10/2021 and                          are not required now). Repeat surveillance biopsies                          with next colonoscopy (2023).                         - ALL FUTURE COLONOSCOPIES SHOULD HAVE DOUBLE                          (EXTENDED) PREP                                                                                     Electronically Signed by: Dr. Ruperto French  ___________________________  RUPERTO FRENCH MD  1/9/2023 3:08:01 PM  I was physically present for the entire viewing portion of the exam.  __________________________  Signature of teaching physician  RUPERTO FRENCH MD  Number of Addenda: 0    Note Initiated On: 1/9/2023 2:19 PM  Scope In:  Scope Out:     ]    ALLERGIES:     Allergies   Allergen Reactions    Ibuprofen Other (See Comments)     Causes colitis flare ups.  Patient does not want to take this medication.    Erythromycin      PN: LW Reaction: Vomiting       PERTINENT MEDICATIONS:    Current Outpatient Medications:     acyclovir (ZOVIRAX) 400 MG tablet, Take 1 tablet (400 mg) by mouth every 8 hours for 5 days, Disp: 15 tablet, Rfl: 2    CALCIUM CITRATE PO, Take 500 mg by mouth daily, Disp: , Rfl:     FERROUS BISGLYCINATE CHELATE PO, Take 1 tablet by mouth [employer] Blood Builder - Vitamin C, B12, Folic acid, Ferrous bisglycinate, Disp: , Rfl:      ustekinumab (STELARA) 90 MG/ML, Inject 1 mL (90 mg) Subcutaneous once every eight weeks, Disp: 1 mL, Rfl: 2    Vitamin D3 (CHOLECALCIFEROL) 25 mcg (1000 units) tablet, Take 50 mcg by mouth daily, Disp: , Rfl:     Current Facility-Administered Medications:     lidocaine (PF) (XYLOCAINE) 1 % injection 7 mL, 7 mL, , , Waldo Mcfadden MD, 7 mL at 09/13/23 1635    triamcinolone (KENALOG-40) injection 40 mg, 40 mg, , , Waldo Mcfadden MD, 40 mg at 09/13/23 1635    SOCIAL HISTORY:  Social History     Socioeconomic History    Marital status:      Spouse name: Not on file    Number of children: Not on file    Years of education: Not on file    Highest education level: Not on file   Occupational History    Not on file   Tobacco Use    Smoking status: Never     Passive exposure: Never    Smokeless tobacco: Never   Vaping Use    Vaping Use: Never used   Substance and Sexual Activity    Alcohol use: Not on file    Drug use: Not on file    Sexual activity: Not on file   Other Topics Concern    Not on file   Social History Narrative    Not on file     Social Determinants of Health     Financial Resource Strain: Not on file   Food Insecurity: Not on file   Transportation Needs: Not on file   Physical Activity: Not on file   Stress: Not on file   Social Connections: Not on file   Interpersonal Safety: Not on file   Housing Stability: Not on file       FAMILY HISTORY:  Family History   Problem Relation Age of Onset    Skin Cancer No family hx of     Melanoma No family hx of        Past/family/social history reviewed and no changes    PHYSICAL EXAMINATION:  Constitutional: aaox3, cooperative, pleasant, not dyspneic/diaphoretic, no acute distress  Vitals reviewed: There were no vitals taken for this visit.  Wt:   Wt Readings from Last 2 Encounters:   10/11/23 54.4 kg (120 lb)   09/13/23 56.7 kg (125 lb)      Eyes: Sclera anicteric/injected  Respiratory: Unlabored breathing  Skin: no jaundice  Psych: Normal  affect      PERTINENT STUDIES:  Most recent CBC:  Recent Labs   Lab Test 10/03/23  1548 04/24/23  1546   WBC 4.4 5.5   HGB 12.4 11.9   HCT 37.9 36.1    226     Most recent hepatic panel:  Recent Labs   Lab Test 10/03/23  1548 04/24/23  1546   ALT 35 25   AST 28 27     Most recent creatinine:  Recent Labs   Lab Test 10/26/23  1337 05/04/22  1419   CR 0.72 0.80                 Again, thank you for allowing me to participate in the care of your patient.      Sincerely,    Eduar Haynes PA-C

## 2023-10-31 NOTE — PATIENT INSTRUCTIONS
It was a pleasure taking care of you today.  I've included a brief summary of our discussion and care plan from today's visit below.  Please review this information with your primary care provider.  ______________________________________________________________________    My recommendations are summarized as follows:    -- Continue stelara every 8 weeks  -- Labs every 3 months  -- Next endoscopic assessment: 2025  -- Patient with IBD we recommend supplementation vitamin D 1000 units daily and calcium 500 mg twice daily.  -- Vaccines/immunizations to be updated: all up to date  -- No NSAIDs (ibuprofen, or anything containing ibuprofen)    For additional resources about inflammatory bowel disease visit http://www.crohnscolitisfoundation.org/    To learn more about Diet and Nutrition in the setting of IBD, check out some of these resources:  https://www.crohnscolitisfoundation.org/diet-and-nutrition/what-should-i-eat  https://www.nimbal.org/  https://ntforibd.org/    Return to GI Clinic in 6 months to review your progress.    ______________________________________________________________________    How do I schedule labs, imaging studies, or procedures that were ordered in clinic today?     Labs: To schedule lab appointment at the [facility], use my chart or call [phone]. If you have a [facility] lab closer to home where you are regularly seen you can give them a call.     Procedures: If a colonoscopy, upper endoscopy, breath test, esophageal manometry, or pH impedence was ordered today, our endoscopy team will call you to schedule this. If you have not heard from our endoscopy team within a week, please call [phone] to schedule.     Imaging Studies: If you were scheduled for a CT scan, X-ray, MRI, ultrasound, HIDA scan or other imaging study, please call [phone] to have this scheduled.     Referral: If a referral to another specialty was ordered, expect a phone call or follow instructions  above. If you have not heard from anyone regarding your referral in a week, please call our clinic to check the status.     Who do I call with any questions after my visit?  Please be in touch if there are any further questions that arise following today's visit.  There are multiple ways to contact your gastroenterology care team.      During business hours, you may reach a Gastroenterology nurse at [phone]    To schedule or reschedule an appointment, please call [phone].     You can always send a secure message through Siriona.  Siriona messages are answered by your nurse or doctor typically within 24 hours.  Please allow extra time on weekends and holidays.      For urgent/emergent questions after business hours, you may reach the on-call GI Fellow by contacting the [facility] [place] [place]  at [phone].     How will I get the results of any tests ordered?    You will receive all of your results.  If you have signed up for [employer], any tests ordered at your visit will be available to you after your physician reviews them.  Typically this takes 1-2 weeks.  If there are urgent results that require a change in your care plan, your physician or nurse will call you to discuss the next steps.      What is Siriona?  Siriona is a secure way for you to access all of your healthcare records from the [facility].  It is a web based computer program, so you can sign on to it from any location.  It also allows you to send secure messages to your care team.  I recommend signing up for Siriona access if you have not already done so and are comfortable with using a computer.         Sincerely,    Eduar Haynes PA-C  [facility]  Division of Gastroenterology

## 2023-10-31 NOTE — PROGRESS NOTES
Virtual Visit Details    Type of service:  Video Visit     Originating Location (pt. Location): Home    Distant Location (provider location):  Off-site  Platform used for Video Visit: [facility]    IBD CLINIC VISIT    CC/REFERRING MD:  Referred Self  REASON FOR CONSULTATION: follow up CD    ASSESSMENT/PLAN:    1. Crohn's ileocolitis - now in endoscopic remission on ustekinumab.     -continue ustekinumab q 8 weeks  -continue maintenance labs every 3 months  -repeat colonoscopy due 2025    IBD HISTORY  Age at diagnosis: 2005  Extent of disease: initially left sided - however most recent colonoscopy shows ileitis and ascending colitis. Biopsies with inflammation throughout colon except descending/sigmoid  Disease phenotype: inflammatory  Ruthy-anal disease: none  Current CD medications:   -Ustekinumab  Prior IBD surgeries: none  Prior IBD Medications:   -Apriso  - other mesalamine  - prednisone 2018    DRUG MONITORING  TPMT enzyme activity: 28 WNL    6-TGN/6-MMPN levels: --    Biologic concentration: --    DISEASE ASSESSMENT  Labs  Recent Labs   Lab Test 10/03/23  1548 04/24/23  1546 12/06/22  1432 05/04/22  1419 08/25/21  1111 08/25/21  1110   CRP  --   --   --  <0.1  --  <2.9   SED 16 14   < > 13  --   --    HGB 12.4 11.9   < > 12.6   < >  --     < > = values in this interval not displayed.     Fecal calprotectin: None  Endoscopy:   August 21, 2020 EGD and Colonoscopy  EGD was normal. Gastric and duodenal biopsies were negative.     Colonoscopy showed 4 aphthous ulcers in the terminal ileum. Some minimal erythema in the distal rectum. Biopsies of the ileum revealed some nonspecific minimally active chornic ileitis possibly due to backwash ileitis per pathologist. The rectal biopsy showed some minimally active UC, but the remainder of the colon biopsies were negative.      Colonscopy 10/2021 - ulceration in ileum. Biopsies chronic active ileitis. Mild inflammation in ascending colon. More c/w Crohn's.  Enterography: MRE  11/10/21  1. Minimal wall thickening and minimal patchy increased enhancement in  the terminal ileum suggests very mild ileitis. Remainder of the small  bowel is normal.  2. Myomatous uterus.  C diff: None    Colonoscopy 1/2023 - normal TI and colon. SESCD score 0    sIBDQ:   IBDQ Score Date IBDQ - Total Score  (Higher score better)   10/24/2023  11:02 AM 67   4/25/2022   9:02 AM 63   11/17/2021   3:01 PM 63       IBD Health Care Maintenance:    Vaccinations:  All patients on biologics should avoid live vaccines.    -- Influenza (every year)  -- TdaP (every 10 years)  -- Pneumococcal Pneumonia (once plus booster at 5 years)  -- Yearly assessment for latent Tb (verbal screening and exam, PPD or QuantiFERON-Tb testing)    One time confirmation of immunity or serologies:  -- Hepatitis A (serologies or immunizations)  -- Hepatitis B (serologies or immunizations)  -- Varicella  -- MMR  -- HPV (all aged 18-26)  -- Meningococcal meningitis (all patients at risk for meningitis)  -- Due to the immunosuppression in this patient, I would not advise administration of live vaccines such as varicella/VZV, intranasal influenza, MMR, or yellow fever vaccine (if travelling).      Bone mineral density screening   -- Recommend all patients supplement with calcium and vitamin D  -- Given prior steroid use recommend DEXA if not already done - ORDERED    Cancer Screening:  Colon cancer screening:  Given colonic involvement, recommend patient undergo regular dysplasia surveillance   Next dysplasia screening is recommended 2025.    Cervical cancer screening: Annual due to immunosupression    Skin cancer screening: Annual visual exam of skin by dermatologist since patient is immunocompromised    Depression Screening:  -- Over the last month, have you felt down, depressed, or hopeless? no  -- Over the last month, have you felt little interest or pleasure doing things? no    Misc:  -- Avoid tobacco use  -- Avoid NSAIDs as there is potentially  a 25% chance of causing an IBD flare    Return to clinic in 6 months     Thank you for this consultation.  It was a pleasure to participate in the care of this patient; please contact us with any further questions.      Eduar Haynes PA-C  Division of Gastroenterology, Hepatology and Nutrition  [facility]     HPI:   Currently, here for follow up. She has achieved endoscopic and clinical remission since starting stelara.     She continues to do great. Tolerating the Stelara well. No side effects.  No GI symptoms. No abdominal pain, tenesmus, diarrhea, blood in stool. No weight loss. No rashes, joint pains, mouth sores, eye pain, eye redness    She tapered off the apriso without issue last year.     Eats a plant based diet exclusively at least for the last 3-4 years. Low sugar and no processed food. High fiber, drinks a lot of water. Le own soy yogurt.    ROS:    No fevers or chills  No weight loss  No blurry vision, double vision or change in vision  No sore throat  No lymphadenopathy  No headache, paraesthesias, or weakness in a limb  No shortness of breath or wheezing  No chest pain or pressure  No arthralgias or myalgias  No rashes or skin changes  No odynophagia or dysphagia  No BRBPR, hematochezia, melena  No dysuria, frequency or urgency  No hot/cold intolerance or polyria  No anxiety or depression    Extra intestinal manifestations of IBD:  No uveitis/episcleritis  No aphthous ulcers   No arthritis   No erythema nodosum/pyoderma gangrenosum.     PERTINENT PAST MEDICAL HISTORY:  No past medical history on file.    PREVIOUS SURGERIES:  Past Surgical History:   Procedure Laterality Date    COLONOSCOPY N/A 10/18/2021    Procedure: COLONOSCOPY, WITH BIOPSY;  Surgeon: Ruperto Cain MD;  [place] [facility] [place]    COLONOSCOPY N/A 1/9/2023    Procedure: COLONOSCOPY;  Surgeon: Ruperto Cain MD;  [place] [facility] [place]       PREVIOUS ENDOSCOPY:  Results for orders placed or performed  during the hospital encounter of 01/09/23   COLONOSCOPY   Result Value Ref Range    COLONOSCOPY       [facility]  [address] [place], [place] [address]., [place] [address] [phone]     Endoscopy Department  _______________________________________________________________________________  Patient Name: Ceci Lopez            Procedure Date: 1/9/2023 2:19 PM  MRN: [id number]                       Account Number: [id number]  YOB: 1970             Admit Type: Outpatient  Age: 52                               Room: [facility] PROCEDURE ROOM 03  Gender: Female                        Note Status: Finalized  Attending MD: KE FRENCH MD  Total Sedation Time:   _______________________________________________________________________________     Procedure:             Colonoscopy  Indications:           Assess therapeutic response to therapy of Crohn's                          disease of the small bowel and colon  Providers:             KE FRENCH MD, Mason Franz RN,                          MASON HER  Referring MD:          CITLALI Finney  Requesting Provider:   Matt Callaway MD  Medicines:             Monitored Anesthesia Care  Complications:         No immediate complications.  _______________________________________________________________________________  Procedure:             Pre-Anesthesia Assessment:                         - See EPIC H and P note                         After obtaining informed consent, the colonoscope was                          passed under direct vision. Throughout the procedure,                          the patient's blood pressure, pulse, and oxygen                          saturations were monitored continuously. The                          Colonoscope was introduced through the anus and                          advanced to the terminal ileum, with identification of                          the appendiceal orifice and IC valve.  The colonoscopy                          was performed without difficulty. The patient                          tolerated the procedure well. The quality  of the bowel                          preparation was fair.                                                                                   Findings:       The perianal and digital rectal examinations were normal.       The terminal ileum appeared normal.       The entire examined colon appeared normal on direct and retroflexion        views.       The Simple Endoscopic Score for Crohn's Disease was determined based on        the endoscopic appearance of the mucosa in the following segments:       - Ileum: Findings include no ulcers present, no ulcerated surfaces, no        affected surfaces and no narrowings. Segment score: 0.       - Right Colon: Findings include no ulcers present, no ulcerated        surfaces, no affected surfaces and no narrowings. Segment score: 0.       - Transverse Colon: Findings include no ulcers present, no ulcerated        surfaces, no affected surfaces and no narrowings. Segment score: 0.       - Left Colon: Findings include no ulcers present, no ulcerate d surfaces,        no affected surfaces and no narrowings. Segment score: 0.       - Rectum: Findings include no ulcers present, no ulcerated surfaces, no        affected surfaces and no narrowings. Segment score: 0.       - Total SES-CD aggregate score: 0.                                                                                   Impression:            - Preparation of the colon was fair.                         - The examined portion of the ileum was normal.                         - The entire examined colon is normal on direct and                          retroflexion views.                         - Simple Endoscopic Score for Crohn's Disease: 0,                          mucosal inflammatory changes secondary to Crohn's                          disease, in remission.                          - No specimens collected.  Recommendation:        - Discharge patient to home (with escort).                         - Resume previous diet.                         - Continue present me dications.                         - Repeat colonoscopy in 2 years for surveillance                          (surveillance biopsies were obtained in 10/2021 and                          are not required now). Repeat surveillance biopsies                          with next colonoscopy (2023).                         - ALL FUTURE COLONOSCOPIES SHOULD HAVE DOUBLE                          (EXTENDED) PREP                                                                                     Electronically Signed by: Dr. Ruperto French  ___________________________  RUPERTO FRENCH MD  1/9/2023 3:08:01 PM  I was physically present for the entire viewing portion of the exam.  __________________________  Signature of teaching physician  RUPERTO FRENCH MD  Number of Addenda: 0    Note Initiated On: 1/9/2023 2:19 PM  Scope In:  Scope Out:     ]    ALLERGIES:     Allergies   Allergen Reactions    Ibuprofen Other (See Comments)     Causes colitis flare ups.  Patient does not want to take this medication.    Erythromycin      PN: LW Reaction: Vomiting       PERTINENT MEDICATIONS:    Current Outpatient Medications:     acyclovir (ZOVIRAX) 400 MG tablet, Take 1 tablet (400 mg) by mouth every 8 hours for 5 days, Disp: 15 tablet, Rfl: 2    CALCIUM CITRATE PO, Take 500 mg by mouth daily, Disp: , Rfl:     FERROUS BISGLYCINATE CHELATE PO, Take 1 tablet by mouth [employer] - Vitamin C, B12, Folic acid, Ferrous bisglycinate, Disp: , Rfl:     ustekinumab (STELARA) 90 MG/ML, Inject 1 mL (90 mg) Subcutaneous once every eight weeks, Disp: 1 mL, Rfl: 2    Vitamin D3 (CHOLECALCIFEROL) 25 mcg (1000 units) tablet, Take 50 mcg by mouth daily, Disp: , Rfl:     Current Facility-Administered Medications:     lidocaine (PF)  (XYLOCAINE) 1 % injection 7 mL, 7 mL, , , Waldo Mcfadden MD, 7 mL at 09/13/23 1635    triamcinolone (KENALOG-40) injection 40 mg, 40 mg, , , Waldo Mcfadden MD, 40 mg at 09/13/23 1635    SOCIAL HISTORY:  Social History     Socioeconomic History    Marital status:      Spouse name: Not on file    Number of children: Not on file    Years of education: Not on file    Highest education level: Not on file   Occupational History    Not on file   Tobacco Use    Smoking status: Never     Passive exposure: Never    Smokeless tobacco: Never   Vaping Use    Vaping Use: Never used   Substance and Sexual Activity    Alcohol use: Not on file    Drug use: Not on file    Sexual activity: Not on file   Other Topics Concern    Not on file   Social History Narrative    Not on file     Social Determinants of Health     Financial Resource Strain: Not on file   Food Insecurity: Not on file   Transportation Needs: Not on file   Physical Activity: Not on file   Stress: Not on file   Social Connections: Not on file   Interpersonal Safety: Not on file   Housing Stability: Not on file       FAMILY HISTORY:  Family History   Problem Relation Age of Onset    Skin Cancer No family hx of     Melanoma No family hx of        Past/family/social history reviewed and no changes    PHYSICAL EXAMINATION:  Constitutional: aaox3, cooperative, pleasant, not dyspneic/diaphoretic, no acute distress  Vitals reviewed: There were no vitals taken for this visit.  Wt:   Wt Readings from Last 2 Encounters:   10/11/23 54.4 kg (120 lb)   09/13/23 56.7 kg (125 lb)      Eyes: Sclera anicteric/injected  Respiratory: Unlabored breathing  Skin: no jaundice  Psych: Normal affect      PERTINENT STUDIES:  Most recent CBC:  Recent Labs   Lab Test 10/03/23  1548 04/24/23  1546   WBC 4.4 5.5   HGB 12.4 11.9   HCT 37.9 36.1    226     Most recent hepatic panel:  Recent Labs   Lab Test 10/03/23  1548 04/24/23  1546   ALT 35 25   AST 28 27     Most recent  creatinine:  Recent Labs   Lab Test 10/26/23  1337 05/04/22  1419   CR 0.72 0.80

## 2023-10-31 NOTE — NURSING NOTE
Is the patient currently in the state of [place]? YES    Visit mode:VIDEO    If the visit is dropped, the patient can be reconnected by: VIDEO VISIT: Text to cell phone:   Telephone Information:   Mobile [phone]       Will anyone else be joining the visit? NO  (If patient encounters technical issues they should call [phone]    How would you like to obtain your AVS? MyChart    Are changes needed to the allergy or medication list? No    Reason for visit: RECHECK    Jeremias JONES

## 2023-11-08 NOTE — PROGRESS NOTES
Received notification that therapy plan had . Stelara infusion completed on 22. Infusion plan discontinued.

## 2023-12-14 ENCOUNTER — MYC REFILL (OUTPATIENT)
Dept: PHARMACY | Facility: CLINIC | Age: 53
End: 2023-12-14
Payer: COMMERCIAL

## 2023-12-14 DIAGNOSIS — B00.9 HERPES SIMPLEX VIRUS INFECTION: ICD-10-CM

## 2023-12-14 RX ORDER — ACYCLOVIR 400 MG/1
400 TABLET ORAL EVERY 8 HOURS
Qty: 15 TABLET | Refills: 2 | Status: SHIPPED | OUTPATIENT
Start: 2023-12-14

## 2024-04-01 ENCOUNTER — TELEPHONE (OUTPATIENT)
Dept: GASTROENTEROLOGY | Facility: CLINIC | Age: 54
End: 2024-04-01
Payer: COMMERCIAL

## 2024-04-01 NOTE — TELEPHONE ENCOUNTER
PA Initiation    Medication: USTEKINUMAB 90 MG/ML [facility]  Insurance Company: [employer] ([facility]) - Phone [phone] Fax [phone]  Pharmacy Filling the Rx:    Filling Pharmacy Phone:    Filling Pharmacy Fax:    Start Date: 4/1/2024  [id number]

## 2024-04-03 NOTE — TELEPHONE ENCOUNTER
Prior Authorization Approval    Medication: USTEKINUMAB 90 MG/ML [facility] SOSY  Authorization Effective Date: 4/3/2024  Authorization Expiration Date: 4/1/2025  Approved Dose/Quantity: 1/56  Reference #: [id number]   Insurance Company: [employer] ([facility]) - Phone [phone] Fax [phone]  Expected CoPay: $    CoPay Card Available:      Financial Assistance Needed:    Which Pharmacy is filling the prescription: [facility] - [place], [place] - [address]  Pharmacy Notified:    Patient Notified:

## 2024-04-18 ENCOUNTER — VIRTUAL VISIT (OUTPATIENT)
Dept: GASTROENTEROLOGY | Facility: CLINIC | Age: 54
End: 2024-04-18
Attending: INTERNAL MEDICINE
Payer: COMMERCIAL

## 2024-04-18 VITALS — BODY MASS INDEX: 21.09 KG/M2 | WEIGHT: 122 LBS

## 2024-04-18 DIAGNOSIS — K50.80 CROHN'S DISEASE OF BOTH SMALL AND LARGE INTESTINE WITHOUT COMPLICATION (H): Primary | ICD-10-CM

## 2024-04-18 DIAGNOSIS — M85.9 LOW BONE DENSITY: ICD-10-CM

## 2024-04-18 PROCEDURE — 99214 OFFICE O/P EST MOD 30 MIN: CPT | Mod: 95 | Performed by: INTERNAL MEDICINE

## 2024-04-18 ASSESSMENT — PAIN SCALES - GENERAL: PAINLEVEL: NO PAIN (0)

## 2024-04-18 NOTE — LETTER
4/18/2024         RE: Ceci Lopez  [address] [facility] [address]        Dear Colleague,    Thank you for referring your patient, Ceci Lopez, to the [facility] [place]. Please see a copy of my visit note below.    IBD CLINIC VISIT    CC/REFERRING MD:  Ruperto Cain    REASON FOR CONSULTATION: follow up CD    ASSESSMENT/PLAN:    Crohn's ileo-colitis - has clinical and endoscopic remission (endoscopy 2023) on ustekinumab    -continue ustekinumab  -continue maintenance labs q 3 months (reminded to get - orders are in)  -colonoscopy due 2025    2. Low bone density - vitamin D normal. On calcium/vitamin D. Saw endocrine - screening for secondary osteopenia done and unremarkable. Has not had a lot of prednisone over the years. I wonder if it is related to prior smoldering Crohn's Disease. This is now treated so hopefully it will stabilize.    -calcium/vitamin D supplement  -DEXA scan repeat per endocrine this summer  -follow with endocrinology      IBD HISTORY  Age at diagnosis: 2005  Extent of disease: initially left sided - however most recent colonoscopy shows ileitis and ascending colitis. Biopsies with inflammation throughout colon except descending/sigmoid  Disease phenotype: inflammatory  Ruthy-anal disease: none  Current CD medications:   -Ustekinumab  Prior IBD surgeries: none  Prior IBD Medications:   - Apriso  - other mesalamine  - prednisone 2018    DRUG MONITORING  TPMT enzyme activity: 28 WNL     6-TGN/6-MMPN levels: --     Biologic concentration: --    DISEASE ASSESSMENT  Labs  Recent Labs   Lab Test 10/03/23  1548 04/24/23  1546 12/06/22  1432 05/04/22  1419 08/25/21  1111 08/25/21  1110   CRP  --   --   --  <0.1  --  <2.9   SED 16 14   < > 13  --   --    HGB 12.4 11.9   < > 12.6   < >  --     < > = values in this interval not displayed.     Fecal calprotectin: None  Endoscopy:   August 21, 2020 EGD and Colonoscopy  EGD was normal. Gastric and  duodenal biopsies were negative.     Colonoscopy showed 4 aphthous ulcers in the terminal ileum. Some minimal erythema in the distal rectum. Biopsies of the ileum revealed some nonspecific minimally active chornic ileitis possibly due to backwash ileitis per pathologist. The rectal biopsy showed some minimally active UC, but the remainder of the colon biopsies were negative.      Colonscopy 10/2021 - ulceration in ileum. Biopsies chronic active ileitis. Mild inflammation in ascending colon. More c/w Crohn's.  Enterography: MRE 11/10/21  1. Minimal wall thickening and minimal patchy increased enhancement in  the terminal ileum suggests very mild ileitis. Remainder of the small  bowel is normal.  2. Myomatous uterus.  C diff: None     Colonoscopy 1/2023 - normal TI and colon. SESCD score 0    sIBDQ:   IBDQ Score Date IBDQ - Total Score  (Higher score better)   4/11/2024  10:34 AM 67   10/24/2023  11:02 AM 67   4/25/2022   9:02 AM 63   11/17/2021   3:01 PM 63       IBD Health Care Maintenance:    Vaccinations:  All patients on biologics should avoid live vaccines.    -- Influenza (every year)  -- TdaP (every 10 years)  -- Pneumococcal Pneumonia (once plus booster at 5 years)  -- Yearly assessment for latent Tb (verbal screening and exam, PPD or QuantiFERON-Tb testing)     One time confirmation of immunity or serologies:  -- Hepatitis A (serologies or immunizations)  -- Hepatitis B (serologies or immunizations)  -- Varicella  -- MMR  -- HPV (all aged 18-26)  -- Meningococcal meningitis (all patients at risk for meningitis)  -- Due to the immunosuppression in this patient, I would not advise administration of live vaccines such as varicella/VZV, intranasal influenza, MMR, or yellow fever vaccine (if travelling).       Bone mineral density screening   -- Recommend all patients supplement with calcium and vitamin D  -- Given prior steroid use recommend DEXA if not already done - low bone density - on calcium/vit D - saw  endocrine. Repeat DEXA for summer [id number]     Cancer Screening:  Colon cancer screening:  Given colonic involvement, recommend patient undergo regular dysplasia surveillance   Next dysplasia screening is recommended 2025.     Cervical cancer screening: Annual due to immunosupression     Skin cancer screening: Annual visual exam of skin by dermatologist since patient is immunocompromised     Depression Screening:  -- Over the last month, have you felt down, depressed, or hopeless? no  -- Over the last month, have you felt little interest or pleasure doing things? no     Misc:  -- Avoid tobacco use  -- Avoid NSAIDs as there is potentially a 25% chance of causing an IBD flare    Return to clinic in 6 months with IBD TENNILLE ([place]) and 12 months with Dr. Cain    Thank you for this consultation.  It was a pleasure to participate in the care of this patient; please contact us with any further questions.  I spent a total of 20 minutes during the day of encounter performed chart review, meeting with patient, patient counseling, care coordination, and documentation.      This note was created with voice recognition software, and while reviewed for accuracy, typos may remain.     Ruperto Cain MD  Division of Gastroenterology, Hepatology and Nutrition  [facility]  Pager: [phone]      HPI:   Currently, here for routine follow up. She reports she is doing great! If she didn't know she had Crohn's she would not know she had any issues.    No GI symptoms at all.    No EIM.    Saw endocrine for low bone density. Getting follow up dexa this summer.    ROS:    No fevers or chills  No weight loss  No blurry vision, double vision or change in vision  No sore throat  No lymphadenopathy  No headache, paraesthesias, or weakness in a limb  No shortness of breath or wheezing  No chest pain or pressure  No arthralgias or myalgias  No rashes or skin changes  No odynophagia or dysphagia  No BRBPR, hematochezia, melena  No dysuria,  frequency or urgency  No hot/cold intolerance or polyria  No anxiety or depression    Extra intestinal manifestations of IBD:  No uveitis/episcleritis  No aphthous ulcers   No arthritis   No erythema nodosum/pyoderma gangrenosum.     PERTINENT PAST MEDICAL HISTORY:  No past medical history on file.    PREVIOUS SURGERIES:  Past Surgical History:   Procedure Laterality Date    COLONOSCOPY N/A 10/18/2021    Procedure: COLONOSCOPY, WITH BIOPSY;  Surgeon: Ke Cain MD;  Location: [facility] [place] OR    COLONOSCOPY N/A 1/9/2023    Procedure: COLONOSCOPY;  Surgeon: Ke Cain MD;  Location: [facility] [place] OR       PREVIOUS ENDOSCOPY:  Results for orders placed or performed during the hospital encounter of 01/09/23   COLONOSCOPY   Result Value Ref Range    COLONOSCOPY       [facility]  [address] [place] [address], [place] [address] [phone]     Endoscopy Department  _______________________________________________________________________________  Patient Name: Ceci Lopez            Procedure Date: 1/9/2023 2:19 PM  MRN: [id number]                       Account Number: [id number]  YOB: 1970             Admit Type: Outpatient  Age: 52                               Room: [facility] PROCEDURE ROOM 03  Gender: Female                        Note Status: Finalized  Attending MD: KE CAIN MD  Total Sedation Time:   _______________________________________________________________________________     Procedure:             Colonoscopy  Indications:           Assess therapeutic response to therapy of Crohn's                          disease of the small bowel and colon  Providers:             KE CAIN MD, Mason Franz, RN,                          MASON HER  Referring MD:          CITLALI Finney  Requesting Provider:   Matt Callaway MD  Medicines:             Monitored Anesthesia Care  Complications:         No immediate  complications.  _______________________________________________________________________________  Procedure:             Pre-Anesthesia Assessment:                         - See EPIC H and P note                         After obtaining informed consent, the colonoscope was                          passed under direct vision. Throughout the procedure,                          the patient's blood pressure, pulse, and oxygen                          saturations were monitored continuously. The                          Colonoscope was introduced through the anus and                          advanced to the terminal ileum, with identification of                          the appendiceal orifice and IC valve. The colonoscopy                          was performed without difficulty. The patient                          tolerated the procedure well. The quality  of the bowel                          preparation was fair.                                                                                   Findings:       The perianal and digital rectal examinations were normal.       The terminal ileum appeared normal.       The entire examined colon appeared normal on direct and retroflexion        views.       The Simple Endoscopic Score for Crohn's Disease was determined based on        the endoscopic appearance of the mucosa in the following segments:       - Ileum: Findings include no ulcers present, no ulcerated surfaces, no        affected surfaces and no narrowings. Segment score: 0.       - Right Colon: Findings include no ulcers present, no ulcerated        surfaces, no affected surfaces and no narrowings. Segment score: 0.       - Transverse Colon: Findings include no ulcers present, no ulcerated        surfaces, no affected surfaces and no narrowings. Segment score: 0.       - Left Colon: Findings include no ulcers present, no ulcerate d surfaces,        no affected surfaces and no narrowings. Segment score: 0.        - Rectum: Findings include no ulcers present, no ulcerated surfaces, no        affected surfaces and no narrowings. Segment score: 0.       - Total SES-CD aggregate score: 0.                                                                                   Impression:            - Preparation of the colon was fair.                         - The examined portion of the ileum was normal.                         - The entire examined colon is normal on direct and                          retroflexion views.                         - Simple Endoscopic Score for Crohn's Disease: 0,                          mucosal inflammatory changes secondary to Crohn's                          disease, in remission.                         - No specimens collected.  Recommendation:        - Discharge patient to home (with escort).                         - Resume previous diet.                         - Continue present me dications.                         - Repeat colonoscopy in 2 years for surveillance                          (surveillance biopsies were obtained in 10/2021 and                          are not required now). Repeat surveillance biopsies                          with next colonoscopy (2023).                         - ALL FUTURE COLONOSCOPIES SHOULD HAVE DOUBLE                          (EXTENDED) PREP                                                                                     Electronically Signed by: Dr. Ruperto French  ___________________________  RUPERTO FRENCH MD  1/9/2023 3:08:01 PM  I was physically present for the entire viewing portion of the exam.  __________________________  Signature of teaching physician  RUPERTO FRENCH MD  Number of Addenda: 0    Note Initiated On: 1/9/2023 2:19 PM  Scope In:  Scope Out:     ]    ALLERGIES:     Allergies   Allergen Reactions    Ibuprofen Other (See Comments)     Causes colitis flare ups.  Patient does not want to take this medication.    Erythromycin       PN: LW Reaction: Vomiting       PERTINENT MEDICATIONS:    Current Outpatient Medications:     acyclovir (ZOVIRAX) 400 MG tablet, Take 1 tablet (400 mg) by mouth every 8 hours, Disp: 15 tablet, Rfl: 2    CALCIUM CITRATE PO, Take 500 mg by mouth daily, Disp: , Rfl:     FERROUS BISGLYCINATE CHELATE PO, Take 1 tablet by mouth [employer] - Vitamin C, B12, Folic acid, Ferrous bisglycinate, Disp: , Rfl:     ustekinumab (STELARA) 90 MG/ML, Inject 1 mL (90 mg) Subcutaneous once every eight weeks, Disp: 1 mL, Rfl: 2    Vitamin D3 (CHOLECALCIFEROL) 25 mcg (1000 units) tablet, Take 50 mcg by mouth daily, Disp: , Rfl:     Current Facility-Administered Medications:     lidocaine (PF) (XYLOCAINE) 1 % injection 7 mL, 7 mL, , , Waldo Mcfadden MD, 7 mL at 09/13/23 1635    triamcinolone (KENALOG-40) injection 40 mg, 40 mg, , , Waldo Mcfadden MD, 40 mg at 09/13/23 1635    SOCIAL HISTORY:  Social History     Socioeconomic History    Marital status:      Spouse name: Not on file    Number of children: Not on file    Years of education: Not on file    Highest education level: Not on file   Occupational History    Not on file   Tobacco Use    Smoking status: Never     Passive exposure: Never    Smokeless tobacco: Never   Vaping Use    Vaping status: Never Used   Substance and Sexual Activity    Alcohol use: Not on file    Drug use: Not on file    Sexual activity: Not on file   Other Topics Concern    Not on file   Social History Narrative    Not on file     Social Determinants of Health     Financial Resource Strain: Low Risk  (2/14/2024)    Financial Resource Strain     Within the past 12 months, have you or your family members you live with been unable to get utilities (heat, electricity) when it was really needed?: No   Food Insecurity: Low Risk  (2/14/2024)    Food Insecurity     Within the past 12 months, did you worry that your food would run out before you got money to buy more?: No     Within the past  12 months, did the food you bought just not last and you didn t have money to get more?: No   Transportation Needs: Low Risk  (2/14/2024)    Transportation Needs     Within the past 12 months, has lack of transportation kept you from medical appointments, getting your medicines, non-medical meetings or appointments, work, or from getting things that you need?: No   Physical Activity: Sufficiently Active (2/14/2024)    Exercise Vital Sign     Days of Exercise per Week: 5 days     Minutes of Exercise per Session: 50 min   Stress: No Stress Concern Present (2/14/2024)    [employer] - Occupational Stress Questionnaire     Feeling of Stress : Only a little   Social Connections: Unknown (2/14/2024)    Social Connection and Isolation Panel [NHANES]     Frequency of Communication with Friends and Family: Not on file     Frequency of Social Gatherings with Friends and Family: Once a week     Attends Shinto Services: Not on file     Active Member of Clubs or Organizations: Not on file     Attends Club or Organization Meetings: Not on file     Marital Status: Not on file   Interpersonal Safety: Not on file   Housing Stability: Low Risk  (2/14/2024)    Housing Stability     Do you have housing? : Yes     Are you worried about losing your housing?: No       FAMILY HISTORY:  Family History   Problem Relation Age of Onset    Skin Cancer No family hx of     Melanoma No family hx of        Past/family/social history reviewed and no changes    PHYSICAL EXAMINATION:  Constitutional: aaox3, cooperative, pleasant, not dyspneic/diaphoretic, no acute distress  Vitals reviewed: Wt 55.3 kg (122 lb)   BMI 21.09 kg/m    Wt:   Wt Readings from Last 2 Encounters:   04/18/24 55.3 kg (122 lb)   10/11/23 54.4 kg (120 lb)      Eyes: Sclera anicteric/injected  Respiratory: Unlabored breathing  Skin: no jaundice  Psych: Normal affect      PERTINENT STUDIES:  Most recent CBC:  Recent Labs   Lab Test 10/03/23  1548  04/24/23  1546   WBC 4.4 5.5   HGB 12.4 11.9   HCT 37.9 36.1    226     Most recent hepatic panel:  Recent Labs   Lab Test 10/03/23  1548 04/24/23  1546   ALT 35 25   AST 28 27     Most recent creatinine:  Recent Labs   Lab Test 10/26/23  1337 05/04/22  1419   CR 0.72 0.80             Again, thank you for allowing me to participate in the care of your patient.      Sincerely,    Ruperto Cain MD

## 2024-04-18 NOTE — PROGRESS NOTES
Virtual Visit Details    Type of service:  Video Visit     Originating Location (pt. Location): Home    Distant Location (provider location):  On-site  Platform used for Video Visit: Kendra    Video start: 2:22 PM  Video end: 2:34 PM    IBD CLINIC VISIT    CC/REFERRING MD:  Ruperto Cain    REASON FOR CONSULTATION: follow up CD    ASSESSMENT/PLAN:    Crohn's ileo-colitis - has clinical and endoscopic remission (endoscopy 2023) on ustekinumab    -continue ustekinumab  -continue maintenance labs q 3 months (reminded to get - orders are in)  -colonoscopy due 2025    2. Low bone density - vitamin D normal. On calcium/vitamin D. Saw endocrine - screening for secondary osteopenia done and unremarkable. Has not had a lot of prednisone over the years. I wonder if it is related to prior smoldering Crohn's Disease. This is now treated so hopefully it will stabilize.    -calcium/vitamin D supplement  -DEXA scan repeat per endocrine this summer  -follow with endocrinology      IBD HISTORY  Age at diagnosis: 2005  Extent of disease: initially left sided - however most recent colonoscopy shows ileitis and ascending colitis. Biopsies with inflammation throughout colon except descending/sigmoid  Disease phenotype: inflammatory  Ruthy-anal disease: none  Current CD medications:   -Ustekinumab  Prior IBD surgeries: none  Prior IBD Medications:   - Apriso  - other mesalamine  - prednisone 2018    DRUG MONITORING  TPMT enzyme activity: 28 WNL     6-TGN/6-MMPN levels: --     Biologic concentration: --    DISEASE ASSESSMENT  Labs  Recent Labs   Lab Test 10/03/23  1548 04/24/23  1546 12/06/22  1432 05/04/22  1419 08/25/21  1111 08/25/21  1110   CRP  --   --   --  <0.1  --  <2.9   SED 16 14   < > 13  --   --    HGB 12.4 11.9   < > 12.6   < >  --     < > = values in this interval not displayed.     Fecal calprotectin: None  Endoscopy:   August 21, 2020 EGD and Colonoscopy  EGD was normal. Gastric and duodenal biopsies were  negative.     Colonoscopy showed 4 aphthous ulcers in the terminal ileum. Some minimal erythema in the distal rectum. Biopsies of the ileum revealed some nonspecific minimally active chornic ileitis possibly due to backwash ileitis per pathologist. The rectal biopsy showed some minimally active UC, but the remainder of the colon biopsies were negative.      Colonscopy 10/2021 - ulceration in ileum. Biopsies chronic active ileitis. Mild inflammation in ascending colon. More c/w Crohn's.  Enterography: MRE 11/10/21  1. Minimal wall thickening and minimal patchy increased enhancement in  the terminal ileum suggests very mild ileitis. Remainder of the small  bowel is normal.  2. Myomatous uterus.  C diff: None     Colonoscopy 1/2023 - normal TI and colon. SESCD score 0    sIBDQ:   IBDQ Score Date IBDQ - Total Score  (Higher score better)   4/11/2024  10:34 AM 67   10/24/2023  11:02 AM 67   4/25/2022   9:02 AM 63   11/17/2021   3:01 PM 63       IBD Health Care Maintenance:    Vaccinations:  All patients on biologics should avoid live vaccines.    -- Influenza (every year)  -- TdaP (every 10 years)  -- Pneumococcal Pneumonia (once plus booster at 5 years)  -- Yearly assessment for latent Tb (verbal screening and exam, PPD or QuantiFERON-Tb testing)     One time confirmation of immunity or serologies:  -- Hepatitis A (serologies or immunizations)  -- Hepatitis B (serologies or immunizations)  -- Varicella  -- MMR  -- HPV (all aged 18-26)  -- Meningococcal meningitis (all patients at risk for meningitis)  -- Due to the immunosuppression in this patient, I would not advise administration of live vaccines such as varicella/VZV, intranasal influenza, MMR, or yellow fever vaccine (if travelling).       Bone mineral density screening   -- Recommend all patients supplement with calcium and vitamin D  -- Given prior steroid use recommend DEXA if not already done - low bone density - on calcium/vit D - saw endocrine. Repeat DEXA  for summer 19102     Cancer Screening:  Colon cancer screening:  Given colonic involvement, recommend patient undergo regular dysplasia surveillance   Next dysplasia screening is recommended 2025.     Cervical cancer screening: Annual due to immunosupression     Skin cancer screening: Annual visual exam of skin by dermatologist since patient is immunocompromised     Depression Screening:  -- Over the last month, have you felt down, depressed, or hopeless? no  -- Over the last month, have you felt little interest or pleasure doing things? no     Misc:  -- Avoid tobacco use  -- Avoid NSAIDs as there is potentially a 25% chance of causing an IBD flare    Return to clinic in 6 months with IBD TENNILLE (mention) and 12 months with Dr. Cain    Thank you for this consultation.  It was a pleasure to participate in the care of this patient; please contact us with any further questions.  I spent a total of 20 minutes during the day of encounter performed chart review, meeting with patient, patient counseling, care coordination, and documentation.      This note was created with voice recognition software, and while reviewed for accuracy, typos may remain.     Ruperto Cain MD  Division of Gastroenterology, Hepatology and Nutrition  [facility]  Pager: [phone]      HPI:   Currently, here for routine follow up. She reports she is doing great! If she didn't know she had Crohn's she would not know she had any issues.    No GI symptoms at all.    No EIM.    Saw endocrine for low bone density. Getting follow up dexa this summer.    ROS:    No fevers or chills  No weight loss  No blurry vision, double vision or change in vision  No sore throat  No lymphadenopathy  No headache, paraesthesias, or weakness in a limb  No shortness of breath or wheezing  No chest pain or pressure  No arthralgias or myalgias  No rashes or skin changes  No odynophagia or dysphagia  No BRBPR, hematochezia, melena  No dysuria, frequency or urgency  No  hot/cold intolerance or polyria  No anxiety or depression    Extra intestinal manifestations of IBD:  No uveitis/episcleritis  No aphthous ulcers   No arthritis   No erythema nodosum/pyoderma gangrenosum.     PERTINENT PAST MEDICAL HISTORY:  No past medical history on file.    PREVIOUS SURGERIES:  Past Surgical History:   Procedure Laterality Date    COLONOSCOPY N/A 10/18/2021    Procedure: COLONOSCOPY, WITH BIOPSY;  Surgeon: Ke Cain MD;  [place] [facility] [place]    COLONOSCOPY N/A 1/9/2023    Procedure: COLONOSCOPY;  Surgeon: Ke Cain MD;  [place] [facility] [place]       PREVIOUS ENDOSCOPY:  Results for orders placed or performed during the hospital encounter of 01/09/23   COLONOSCOPY   Result Value Ref Range    COLONOSCOPY       [facility]  [address] [place] [address], [place] [address] [phone]     Endoscopy Department  _______________________________________________________________________________  Patient Name: Ceci Lopez            Procedure Date: 1/9/2023 2:19 PM  MRN: [id number]                       Account Number: [id number]  YOB: 1970             Admit Type: Outpatient  Age: 52                               [place] [facility] [place] [facility]  Gender: Female                        Note Status: Finalized  Attending MD: KE CAIN MD  Total Sedation Time:   _______________________________________________________________________________     Procedure:             Colonoscopy  Indications:           Assess therapeutic response to therapy of Crohn's                          disease of the small bowel and colon  Providers:             KE CAIN MD, Mason Franz, RN,                          MASON HER  Referring MD:          CITLALI Finney  Requesting Provider:   Matt Callaway MD  Medicines:             Monitored Anesthesia Care  Complications:         No immediate  complications.  _______________________________________________________________________________  Procedure:             Pre-Anesthesia Assessment:                         - See EPIC H and P note                         After obtaining informed consent, the colonoscope was                          passed under direct vision. Throughout the procedure,                          the patient's blood pressure, pulse, and oxygen                          saturations were monitored continuously. The                          Colonoscope was introduced through the anus and                          advanced to the terminal ileum, with identification of                          the appendiceal orifice and IC valve. The colonoscopy                          was performed without difficulty. The patient                          tolerated the procedure well. The quality  of the bowel                          preparation was fair.                                                                                   Findings:       The perianal and digital rectal examinations were normal.       The terminal ileum appeared normal.       The entire examined colon appeared normal on direct and retroflexion        views.       The Simple Endoscopic Score for Crohn's Disease was determined based on        the endoscopic appearance of the mucosa in the following segments:       - Ileum: Findings include no ulcers present, no ulcerated surfaces, no        affected surfaces and no narrowings. Segment score: 0.       - Right Colon: Findings include no ulcers present, no ulcerated        surfaces, no affected surfaces and no narrowings. Segment score: 0.       - Transverse Colon: Findings include no ulcers present, no ulcerated        surfaces, no affected surfaces and no narrowings. Segment score: 0.       - Left Colon: Findings include no ulcers present, no ulcerate d surfaces,        no affected surfaces and no narrowings. Segment score: 0.        - Rectum: Findings include no ulcers present, no ulcerated surfaces, no        affected surfaces and no narrowings. Segment score: 0.       - Total SES-CD aggregate score: 0.                                                                                   Impression:            - Preparation of the colon was fair.                         - The examined portion of the ileum was normal.                         - The entire examined colon is normal on direct and                          retroflexion views.                         - Simple Endoscopic Score for Crohn's Disease: 0,                          mucosal inflammatory changes secondary to Crohn's                          disease, in remission.                         - No specimens collected.  Recommendation:        - Discharge patient to home (with escort).                         - Resume previous diet.                         - Continue present me dications.                         - Repeat colonoscopy in 2 years for surveillance                          (surveillance biopsies were obtained in 10/2021 and                          are not required now). Repeat surveillance biopsies                          with next colonoscopy (2023).                         - ALL FUTURE COLONOSCOPIES SHOULD HAVE DOUBLE                          (EXTENDED) PREP                                                                                     Electronically Signed by: Dr. Ruperto French  ___________________________  RUPERTO FRENCH MD  1/9/2023 3:08:01 PM  I was physically present for the entire viewing portion of the exam.  __________________________  Signature of teaching physician  RUPERTO FRENCH MD  Number of Addenda: 0    Note Initiated On: 1/9/2023 2:19 PM  Scope In:  Scope Out:     ]    ALLERGIES:     Allergies   Allergen Reactions    Ibuprofen Other (See Comments)     Causes colitis flare ups.  Patient does not want to take this medication.    Erythromycin       PN: LW Reaction: Vomiting       PERTINENT MEDICATIONS:    Current Outpatient Medications:     acyclovir (ZOVIRAX) 400 MG tablet, Take 1 tablet (400 mg) by mouth every 8 hours, Disp: 15 tablet, Rfl: 2    CALCIUM CITRATE PO, Take 500 mg by mouth daily, Disp: , Rfl:     FERROUS BISGLYCINATE CHELATE PO, Take 1 tablet by mouth [employer] - Vitamin C, B12, Folic acid, Ferrous bisglycinate, Disp: , Rfl:     ustekinumab (STELARA) 90 MG/ML, Inject 1 mL (90 mg) Subcutaneous once every eight weeks, Disp: 1 mL, Rfl: 2    Vitamin D3 (CHOLECALCIFEROL) 25 mcg (1000 units) tablet, Take 50 mcg by mouth daily, Disp: , Rfl:     Current Facility-Administered Medications:     lidocaine (PF) (XYLOCAINE) 1 % injection 7 mL, 7 mL, , , Waldo Mcfadden MD, 7 mL at 09/13/23 1635    triamcinolone (KENALOG-40) injection 40 mg, 40 mg, , , Waldo Mcfadden MD, 40 mg at 09/13/23 1635    SOCIAL HISTORY:  Social History     Socioeconomic History    Marital status:      Spouse name: Not on file    Number of children: Not on file    Years of education: Not on file    Highest education level: Not on file   Occupational History    Not on file   Tobacco Use    Smoking status: Never     Passive exposure: Never    Smokeless tobacco: Never   Vaping Use    Vaping status: Never Used   Substance and Sexual Activity    Alcohol use: Not on file    Drug use: Not on file    Sexual activity: Not on file   Other Topics Concern    Not on file   Social History Narrative    Not on file     Social Determinants of Health     Financial Resource Strain: Low Risk  (2/14/2024)    Financial Resource Strain     Within the past 12 months, have you or your family members you live with been unable to get utilities (heat, electricity) when it was really needed?: No   Food Insecurity: Low Risk  (2/14/2024)    Food Insecurity     Within the past 12 months, did you worry that your food would run out before you got money to buy more?: No     Within the past  12 months, did the food you bought just not last and you didn t have money to get more?: No   Transportation Needs: Low Risk  (2/14/2024)    Transportation Needs     Within the past 12 months, has lack of transportation kept you from medical appointments, getting your medicines, non-medical meetings or appointments, work, or from getting things that you need?: No   Physical Activity: Sufficiently Active (2/14/2024)    Exercise Vital Sign     Days of Exercise per Week: 5 days     Minutes of Exercise per Session: 50 min   Stress: No Stress Concern Present (2/14/2024)    [employer] - Occupational Stress Questionnaire     Feeling of Stress : Only a little   Social Connections: Unknown (2/14/2024)    Social Connection and Isolation Panel [NHANES]     Frequency of Communication with Friends and Family: Not on file     Frequency of Social Gatherings with Friends and Family: Once a week     Attends Jain Services: Not on file     Active Member of Clubs or Organizations: Not on file     Attends Club or Organization Meetings: Not on file     Marital Status: Not on file   Interpersonal Safety: Not on file   Housing Stability: Low Risk  (2/14/2024)    Housing Stability     Do you have housing? : Yes     Are you worried about losing your housing?: No       FAMILY HISTORY:  Family History   Problem Relation Age of Onset    Skin Cancer No family hx of     Melanoma No family hx of        Past/family/social history reviewed and no changes    PHYSICAL EXAMINATION:  Constitutional: aaox3, cooperative, pleasant, not dyspneic/diaphoretic, no acute distress  Vitals reviewed: Wt 55.3 kg (122 lb)   BMI 21.09 kg/m    Wt:   Wt Readings from Last 2 Encounters:   04/18/24 55.3 kg (122 lb)   10/11/23 54.4 kg (120 lb)      Eyes: Sclera anicteric/injected  Respiratory: Unlabored breathing  Skin: no jaundice  Psych: Normal affect      PERTINENT STUDIES:  Most recent CBC:  Recent Labs   Lab Test 10/03/23  1548  04/24/23  1546   WBC 4.4 5.5   HGB 12.4 11.9   HCT 37.9 36.1    226     Most recent hepatic panel:  Recent Labs   Lab Test 10/03/23  1548 04/24/23  1546   ALT 35 25   AST 28 27     Most recent creatinine:  Recent Labs   Lab Test 10/26/23  1337 05/04/22  1419   CR 0.72 0.80

## 2024-04-18 NOTE — PATIENT INSTRUCTIONS
It is good to see you today. I am so happy you are doing well. Keep up the good work!    Continue your Stelara as you are    Please get your labs this week/next week and every 3 months after that. I recommend setting a reminder on your calendar. Labs will be in the system for you to get.    Continue your calcium and vitamin D    Please get your follow up DEXA scan this summer    Please see a dermatologist yearly    Please get a yearly pap smear    Follow up with Eduar Haynes in 6 months and Dr. Cain in 12 months

## 2024-04-18 NOTE — NURSING NOTE
Is the patient currently in the state of [place]? YES    Visit mode:VIDEO    If the visit is dropped, the patient can be reconnected by: VIDEO VISIT: Send to e-mail at: [email or web address]    Will anyone else be joining the visit? NO  (If patient encounters technical issues they should call [phone]    How would you like to obtain your AVS? MyChart    Are changes needed to the allergy or medication list? Pt stated no changes to allergies and Pt stated no med changes    Are refills needed on medications prescribed by this physician? NO    Reason for visit: RECHECK    Leo FIGUEREDOF

## 2024-04-25 ENCOUNTER — TELEPHONE (OUTPATIENT)
Dept: GASTROENTEROLOGY | Facility: CLINIC | Age: 54
End: 2024-04-25
Payer: COMMERCIAL

## 2024-04-30 DIAGNOSIS — K50.80 CROHN'S DISEASE OF BOTH SMALL AND LARGE INTESTINE WITHOUT COMPLICATION (H): ICD-10-CM

## 2024-05-06 ENCOUNTER — LAB (OUTPATIENT)
Dept: LAB | Facility: CLINIC | Age: 54
End: 2024-05-06
Payer: COMMERCIAL

## 2024-05-06 DIAGNOSIS — K50.80 CROHN'S DISEASE OF BOTH SMALL AND LARGE INTESTINE WITHOUT COMPLICATION (H): ICD-10-CM

## 2024-05-06 LAB
BASOPHILS # BLD AUTO: 0 10E3/UL (ref 0–0.2)
BASOPHILS NFR BLD AUTO: 1 %
EOSINOPHIL # BLD AUTO: 0.1 10E3/UL (ref 0–0.7)
EOSINOPHIL NFR BLD AUTO: 1 %
ERYTHROCYTE [DISTWIDTH] IN BLOOD BY AUTOMATED COUNT: 12.1 % (ref 10–15)
ERYTHROCYTE [SEDIMENTATION RATE] IN BLOOD BY WESTERGREN METHOD: 17 MM/HR (ref 0–30)
HCT VFR BLD AUTO: 38.7 % (ref 35–47)
HGB BLD-MCNC: 12.7 G/DL (ref 11.7–15.7)
IMM GRANULOCYTES # BLD: 0 10E3/UL
IMM GRANULOCYTES NFR BLD: 0 %
LYMPHOCYTES # BLD AUTO: 2.1 10E3/UL (ref 0.8–5.3)
LYMPHOCYTES NFR BLD AUTO: 32 %
MCH RBC QN AUTO: 31.1 PG (ref 26.5–33)
MCHC RBC AUTO-ENTMCNC: 32.8 G/DL (ref 31.5–36.5)
MCV RBC AUTO: 95 FL (ref 78–100)
MONOCYTES # BLD AUTO: 0.4 10E3/UL (ref 0–1.3)
MONOCYTES NFR BLD AUTO: 5 %
NEUTROPHILS # BLD AUTO: 4.1 10E3/UL (ref 1.6–8.3)
NEUTROPHILS NFR BLD AUTO: 61 %
PLATELET # BLD AUTO: 252 10E3/UL (ref 150–450)
RBC # BLD AUTO: 4.09 10E6/UL (ref 3.8–5.2)
WBC # BLD AUTO: 6.7 10E3/UL (ref 4–11)

## 2024-05-06 PROCEDURE — 85025 COMPLETE CBC W/AUTO DIFF WBC: CPT

## 2024-05-06 PROCEDURE — 86140 C-REACTIVE PROTEIN: CPT

## 2024-05-06 PROCEDURE — 85652 RBC SED RATE AUTOMATED: CPT

## 2024-05-06 PROCEDURE — 80076 HEPATIC FUNCTION PANEL: CPT

## 2024-05-06 PROCEDURE — 36415 COLL VENOUS BLD VENIPUNCTURE: CPT

## 2024-05-07 LAB
ALBUMIN SERPL BCG-MCNC: 4.6 G/DL (ref 3.5–5.2)
ALP SERPL-CCNC: 61 U/L (ref 40–150)
ALT SERPL W P-5'-P-CCNC: 37 U/L (ref 0–50)
AST SERPL W P-5'-P-CCNC: 31 U/L (ref 0–45)
BILIRUB DIRECT SERPL-MCNC: <0.2 MG/DL (ref 0–0.3)
BILIRUB SERPL-MCNC: 0.2 MG/DL
CRP SERPL-MCNC: <3 MG/L
PROT SERPL-MCNC: 7.6 G/DL (ref 6.4–8.3)

## 2024-05-07 RX ORDER — USTEKINUMAB 90 MG/ML
INJECTION, SOLUTION SUBCUTANEOUS
Qty: 1 ML | Refills: 2 | Status: SHIPPED | OUTPATIENT
Start: 2024-05-07

## 2024-05-21 ENCOUNTER — VIRTUAL VISIT (OUTPATIENT)
Dept: GASTROENTEROLOGY | Facility: CLINIC | Age: 54
End: 2024-05-21
Attending: INTERNAL MEDICINE
Payer: COMMERCIAL

## 2024-05-21 DIAGNOSIS — Z78.9 TAKES DIETARY SUPPLEMENTS: ICD-10-CM

## 2024-05-21 DIAGNOSIS — K50.00 CROHN'S DISEASE OF SMALL INTESTINE WITHOUT COMPLICATION (H): Primary | ICD-10-CM

## 2024-05-21 NOTE — Clinical Note
5/21/2024         RE: Ceci Lopez  [address] [place] [address]        Dear Colleague,    Thank you for referring your patient, Ceci Lopez, to the [facility]. Please see a copy of my visit note below.    Medication Therapy Management (MTM) Encounter    ASSESSMENT:                            Medication Adherence/Access: No issues identified    Crohn's Disease:  Ceci would benefit from continued treatment with Stelara every 8 weeks. They are up to date on routine maintenance labs. They are due for annual tuberculosis screening, appropriate to wait until next routine labs are due.  No access issues for their advanced therapy are present. They are indicated for a few vaccinations which were recommended to them. They are not getting adequate calcium in their diet and supplementation was recommended. They are indicated for a DEXA scan and I recommend ***. Reminders for routine cancer screening were provided.       PLAN:                            You are due for annual tuberculosis screening. You can have this completed with your next routine labs.   Reminder to schedule a routine skin check, you can call [phone] to schedule an appointment.  You are up to date on vaccines.  Reminder to discuss with your primary care provider when you are due for your next routine cervical cancer screening.  Reminder to schedule your DEXA scan. This has been ordered by Dr. Stefanie Esquivel MD. To schedule the DEXA call the imaging schedulers at [phone]. For an out-of-pocket estimate, call the Cost of Care estimate line at [phone].  I recommend a calcium goal of 1200 mg/day between diet and supplementation. Please review the foods you eat and see if you are getting at least 1200 mg/day of calcium from your diet. If you are not getting adequate calcium from your diet then consider adding additional supplementation (such as twice daily calcium).  "https://www.dietaryguidelines.gov/food-sources-calcium    Follow-up: {followuptest2:749686}    SUBJECTIVE/OBJECTIVE:                          Ceci Lopez is a 53 year old female { :022277} for {mtmvisit:634133}     Reason for visit: ***.    Allergies/ADRs: Reviewed in chart  Past Medical History: Reviewed in chart  Tobacco: She reports that she has never smoked. She has never been exposed to tobacco smoke. She has never used smokeless tobacco.  Alcohol: none      Medication Adherence/Access: no issues reported    Crohn's Disease:   Stelara 90 mg every 8 weeks    Keeps track of injections on calendar. Also gets a text message from [facility]. Denies side effects or injection site reactions.     Denies recent exposures to tuberculosis or symptoms or side effects of tuberculosis disease.    Met with Ceci for a routine follow-up visit. In my previous visit, the primary concern was that [place] [facility] [place] Fever vaccination, and she was currently on immunosuppression. I had placed a referral to Travel Medicine, and had reached out to the provider she got scheduled with and was told the following \"I reviewed her travel plans and see that [place] is [place] [facility] [place] where [place] Fever is a requirement. We would not give a live vaccine while on Stelara. The recommendations are to wait 6 months after such Biologics. She should definitely keep her appointment as we can complete other travel vaccines, discuss malaria and prescribe her antimalarials. I can also issue a medical waiver in case she decides to travel without the Yellow Fever vaccine (not recommended) and also discuss the risks of traveling to [place] without the vaccine.\"    After consideration of the risks of travelling to [place] without Yellow Fever vaccine, she decided to cancel her trip.    Prior authorization status: Stelara 1/56 approved through 4/1/2025  Original start date: " Stelara IV infusion January 2021  Last dose: 5/19/2024   Next dose:    Last provider visit: 4/18/2024 Ruperto Cain MD  Next provider visit: RTC 6 months, not scheduled  Last labs completed: 5/6/2024  Lab frequency: every 3 months   - standing labs yes CBC with platelets & diff, ESR, CRP, hepatic function panel 10/9/2023  Next labs due: 8/6/2024    Last IBD Health Maintenance Review: 10/6/2023  PDC: 100%    IBD Health Maintenance    Vaccinations:  All patients on biologics should avoid live vaccines unless specifically indicated.    -- Influenza (every year)- last 9/2023  -- TdaP (every 10 years)- last 12/2019  -- Pneumococcal Pneumonia    Prevnar-13: 7/25/2018   Pneumovax-23: 9/25/2018   Prevnar-20: 7/11/2022  -- COVID-19 4/2021, 4/2021, 9/2021, 2/2022, 9/2022, 4/2023, 9/2023    One time confirmation of immunity or serologies:  -- Hepatitis A (serologies or immunizations)- 3/209, 12/2019  -- Hepatitis B (serologies or immunizations)- serology indicate immunity 2021  -- Varicella/Zoster    Varicella/Zoster- Shingrix 4/2023, 9/2023  -- MMR- 7/1991, 5/1985  -- Meningococcal meningitis (all patients at risk for meningitis)-- Menactra 3/2009    Due to the immunosuppression in this patient, I would not advise administration of live vaccines such as varicella/VZV, intranasal influenza, MMR, or yellow fever vaccine (if traveling).      Immunosuppressive Screening:  -- Hep B Surface Antibody serologies indicate immunity 8/25/2021  -- Hep B Surface Antigen non-reactive 8/25/2021  -- Hep B Core Antibody non-reactive 8/25/2021  -- Hep C Antibody non-reactive 10/26/2023  -- Yearly assessment of TB not done; negative 4/24/2023    Lab Results   Component Value Date    AUSAB 740.94 (H) 08/25/2021    HEPBANG Nonreactive 08/25/2021    HBCAB Nonreactive 08/25/2021    HCVAB Nonreactive 10/26/2023    TBRES Negative 04/24/2023       Bone mineral density screening   -- Recommend all patients supplement with calcium and vitamin D  --  "Last DXA 6/9/2023  -- Follows with Endocrinology, recommendations from 10/11/2023 visit note \"General management-strengthening exercises, fall prevention, calcium 1200 mg daily from all sources and continue current vitamin D.  Check a follow-up bone density in 1 year.\"  --     Cancer Screening:  Colon cancer screening:  Given colonic involvement, recommend patient undergo regular dysplasia surveillance   Next dysplasia screening is recommended 2025.     Cervical cancer screening: Annual due to immunosupression     Skin cancer screening: Annual visual exam of skin by dermatologist since patient is immunocompromised- last 10/2022    Depression Screening:    PHQ-2 Score:         4/18/2024     2:04 PM 10/31/2023     1:59 PM   PHQ-2 ( 1999 Pfizer)   Q1: Little interest or pleasure in doing things 0 0   Q2: Feeling down, depressed or hopeless 0 0   PHQ-2 Score 0 0   Q1: Little interest or pleasure in doing things Not at all    Q2: Feeling down, depressed or hopeless Not at all    PHQ-2 Score 0        Research:  Are you interested in being contacted about enrollment in clinical research studies? Yes    Would you like to receive a quarterly newsletter on research via email. YES [email or web address]        Misc:  -- Avoid tobacco use  -- Avoid NSAIDs as there is potentially a 25% chance of causing an IBD flare    Supplements:   Calcium citrate 500 mg once daily    Denies side effects or concerns. She tried twice daily dosing but has a hard time remembering.      ----------------      I spent 22 minutes with this patient today. All changes were made via collaborative practice agreement with Ruperto Cain MD. A copy of the visit note was provided to the patient's provider(s).    A summary of these recommendations was sent via [employer].    Claudio Vera, PharmD, BCPS  MTM Pharmacist   [facility]  Phone: [phone]    Telemedicine Visit Details  Type of service:  Video Conference via [employer]  Start " Time:  1:02 PM  End Time:  1:24 PM     Medication Therapy Recommendations  No medication therapy recommendations to display       Medication Therapy Management (MTM) Encounter    ASSESSMENT:                            Medication Adherence/Access: No issues identified    Crohn's Disease:  Ceci would benefit from continued treatment with Stelara every 8 weeks. They are up to date on routine maintenance labs. They are due for annual tuberculosis screening, appropriate to wait until next routine labs are due.  No access issues for their advanced therapy are present. They are indicated for a few vaccinations which were recommended to them. Recommend reviewing diet to see if getting adequate calcium supplementation with once daily supplementation. They are indicated for a DEXA scan and I recommend completing this per Endocrinology recommendations. Reminders for routine cancer screening were provided.     PLAN:                            You are due for annual tuberculosis screening. You can have this completed with your next routine labs.   Reminder to schedule a routine skin check, you can call [phone] to schedule an appointment.  You are up to date on vaccines.  Reminder to discuss with your primary care provider when you are due for your next routine cervical cancer screening.  Reminder to schedule your DEXA scan. This has been ordered by Dr. Stefanie Esquivel MD. To schedule the DEXA call the imaging schedulers at [phone]. For an out-of-pocket estimate, call the Cost of Care estimate line at [phone].  I recommend a calcium goal of 1200 mg/day between diet and supplementation. Please review the foods you eat and see if you are getting at least 1200 mg/day of calcium from your diet. If you are not getting adequate calcium from your diet then consider adding additional supplementation (such as twice daily calcium). https://www.dietaryguidelines.gov/food-sources-calcium    Follow-up: 11/19/2024 at 3:00  "PM (video)    SUBJECTIVE/OBJECTIVE:                          Ceci Lopez is a 53 year old female contacted via secure video for a follow-up visit.       Reason for visit: Stelara + IBD health maintenance.    Allergies/ADRs: Reviewed in chart  Past Medical History: Reviewed in chart  Tobacco: She reports that she has never smoked. She has never been exposed to tobacco smoke. She has never used smokeless tobacco.  Alcohol: none      Medication Adherence/Access: no issues reported    Crohn's Disease:   Stelara 90 mg every 8 weeks    Keeps track of injections on calendar. Also gets a text message from [facility]. Denies side effects or injection site reactions.     Denies recent exposures to tuberculosis or symptoms or side effects of tuberculosis disease.    Met with Ceci for a routine follow-up visit. In my previous visit 10/6/23, the primary concern was that she was travelling to [facility], which is a country that requires Yellow Fever vaccination, and she was currently on immunosuppression. I had placed a referral to Travel Medicine, and had reached out to the provider she got scheduled with and was told the following \"I reviewed her travel plans and see that she is traveling to [facility] which is a country where Yellow Fever is a requirement. We would not give a live vaccine while on Stelara. The recommendations are to wait 6 months after such Biologics. She should definitely keep her appointment as we can complete other travel vaccines, discuss malaria and prescribe her antimalarials. I can also issue a medical waiver in case she decides to travel without the Yellow Fever vaccine (not recommended) and also discuss the risks of traveling to [place] without the vaccine.\" After consideration of the risks of travelling to [place] without Yellow Fever vaccine, she decided to cancel her trip.    Prior authorization status: Stelara 1/56 approved through 4/1/2025  Original start date: Stelara IV infusion January " 2021  Last dose: 5/19/2024   Next dose: 7/14/2024    Last provider visit: 4/18/2024 Ruperto Cain MD  Next provider visit: RTC 6 months, not scheduled  Last labs completed: 5/6/2024  Lab frequency: every 3 months   - standing labs yes CBC with platelets & diff, ESR, CRP, hepatic function panel 10/9/2023  Next labs due: 8/6/2024    Last IBD Health Maintenance Review: 10/6/2023  PDC: 100%    IBD Health Maintenance    Vaccinations:  All patients on biologics should avoid live vaccines unless specifically indicated.    -- Influenza (every year)- last 9/2023  -- TdaP (every 10 years)- last 12/2019  -- Pneumococcal Pneumonia    Prevnar-13: 7/25/2018   Pneumovax-23: 9/25/2018   Prevnar-20: 7/11/2022  -- COVID-19 4/2021, 4/2021, 9/2021, 2/2022, 9/2022, 4/2023, 9/2023    One time confirmation of immunity or serologies:  -- Hepatitis A (serologies or immunizations)- 3/209, 12/2019  -- Hepatitis B (serologies or immunizations)- serology indicate immunity 2021  -- Varicella/Zoster    Varicella/Zoster- Shingrix 4/2023, 9/2023  -- MMR- 7/1991, 5/1985  -- Meningococcal meningitis (all patients at risk for meningitis)-- Menactra 3/2009    Due to the immunosuppression in this patient, I would not advise administration of live vaccines such as varicella/VZV, intranasal influenza, MMR, or yellow fever vaccine (if traveling).      Immunosuppressive Screening:  -- Hep B Surface Antibody serologies indicate immunity 8/25/2021  -- Hep B Surface Antigen non-reactive 8/25/2021  -- Hep B Core Antibody non-reactive 8/25/2021  -- Hep C Antibody non-reactive 10/26/2023  -- Yearly assessment of TB not done; negative 4/24/2023    Lab Results   Component Value Date    AUSAB 740.94 (H) 08/25/2021    HEPBANG Nonreactive 08/25/2021    HBCAB Nonreactive 08/25/2021    HCVAB Nonreactive 10/26/2023    TBRES Negative 04/24/2023       Bone mineral density screening   -- Recommend all patients supplement with calcium and vitamin D  -- Last DXA 6/9/2023  --  "Follows with Endocrinology, recommendations from 10/11/2023 visit note \"General management-strengthening exercises, fall prevention, calcium 1200 mg daily from all sources and continue current vitamin D.  Check a follow-up bone density in 1 year.\"      Cancer Screening:  Colon cancer screening:  Given colonic involvement, recommend patient undergo regular dysplasia surveillance   Next dysplasia screening is recommended 2025.     Cervical cancer screening: Annual due to immunosupression     Skin cancer screening: Annual visual exam of skin by dermatologist since patient is immunocompromised- last 10/2022    Depression Screening:    PHQ-2 Score:         4/18/2024     2:04 PM 10/31/2023     1:59 PM   PHQ-2 ( 1999 Pfizer)   Q1: Little interest or pleasure in doing things 0 0   Q2: Feeling down, depressed or hopeless 0 0   PHQ-2 Score 0 0   Q1: Little interest or pleasure in doing things Not at all    Q2: Feeling down, depressed or hopeless Not at all    PHQ-2 Score 0        Research:  Are you interested in being contacted about enrollment in clinical research studies? Yes    Would you like to receive a quarterly newsletter on research via email. YES [email or web address]        Misc:  -- Avoid tobacco use  -- Avoid NSAIDs as there is potentially a 25% chance of causing an IBD flare    Supplements:   Calcium citrate 500 mg once daily    Denies side effects or concerns. She tried twice daily dosing but has a hard time remembering. Believes she gets a good amount of calcium diet.     ----------------      I spent 22 minutes with this patient today. All changes were made via collaborative practice agreement with Ruperto Cain MD. A copy of the visit note was provided to the patient's provider(s).    A summary of these recommendations was sent via [employer].    Claudio Vera, PharmD, BCPS  MTM Pharmacist   [facility]  Phone: [phone]    Telemedicine Visit Details  Type of service:  Video " Conference via [id number]  Start Time:  1:02 PM  End Time:  1:24 PM     Medication Therapy Recommendations  No medication therapy recommendations to display         Again, thank you for allowing me to participate in the care of your patient.        Sincerely,        Claudio Vera RPH

## 2024-05-21 NOTE — PROGRESS NOTES
Medication Therapy Management (MTM) Encounter    ASSESSMENT:                            Medication Adherence/Access: No issues identified    Crohn's Disease:  Ceci would benefit from continued treatment with Stelara every 8 weeks. They are up to date on routine maintenance labs. They are due for annual tuberculosis screening, appropriate to wait until next routine labs are due.  No access issues for their advanced therapy are present. They are indicated for a few vaccinations which were recommended to them. Recommend reviewing diet to see if getting adequate calcium supplementation with once daily supplementation. They are indicated for a DEXA scan and I recommend completing this per Endocrinology recommendations. Reminders for routine cancer screening were provided.     PLAN:                            You are due for annual tuberculosis screening. You can have this completed with your next routine labs.   Reminder to schedule a routine skin check, you can call [phone] to schedule an appointment.  You are up to date on vaccines.  Reminder to discuss with your primary care provider when you are due for your next routine cervical cancer screening.  Reminder to schedule your DEXA scan. This has been ordered by Dr. Stefanie Esquivel MD. To schedule the DEXA call the imaging schedulers at [phone]. For an out-of-pocket estimate, call the Cost of Care estimate line at [phone].  I recommend a calcium goal of 1200 mg/day between diet and supplementation. Please review the foods you eat and see if you are getting at least 1200 mg/day of calcium from your diet. If you are not getting adequate calcium from your diet then consider adding additional supplementation (such as twice daily calcium). https://www.dietaryguidelines.gov/food-sources-calcium    Follow-up: 11/19/2024 at 3:00 PM (video)    SUBJECTIVE/OBJECTIVE:                          Ceci Lopez is a 53 year old female contacted via secure video for a  "follow-up visit.       Reason for visit: Stelara + IBD health maintenance.    Allergies/ADRs: Reviewed in chart  Past Medical History: Reviewed in chart  Tobacco: She reports that she has never smoked. She has never been exposed to tobacco smoke. She has never used smokeless tobacco.  Alcohol: none      Medication Adherence/Access: no issues reported    Crohn's Disease:   Stelara 90 mg every 8 weeks    Keeps track of injections on calendar. Also gets a text message from [facility]. Denies side effects or injection site reactions.     Denies recent exposures to tuberculosis or symptoms or side effects of tuberculosis disease.    Met with Ceci for a routine follow-up visit. In my previous visit 10/6/23, the primary concern was that she was travelling to [place], which is a country that requires Yellow Fever vaccination, and she was currently on immunosuppression. I had placed a referral to Travel Medicine, and had reached out to the provider she got scheduled with and was told the following \"I reviewed her travel plans and see that she is traveling to [facility] which is a country where Yellow Fever is a requirement. We would not give a live vaccine while on Stelara. The recommendations are to wait 6 months after such Biologics. She should definitely keep her appointment as we can complete other travel vaccines, discuss malaria and prescribe her antimalarials. I can also issue a medical waiver in case she decides to travel without the Yellow Fever vaccine (not recommended) and also discuss the risks of traveling to [place] without the vaccine.\" After consideration of the risks of travelling to [place] without Yellow Fever vaccine, she decided to cancel her trip.    Prior authorization status: Stelara 1/56 approved through 4/1/2025  Original start date: Stelara IV infusion January 2021  Last dose: 5/19/2024   Next dose: 7/14/2024    Last provider visit: 4/18/2024 Ruperto Cain MD  Next provider visit: RTC 6 " "months, not scheduled  Last labs completed: 5/6/2024  Lab frequency: every 3 months   - standing labs yes CBC with platelets & diff, ESR, CRP, hepatic function panel 10/9/2023  Next labs due: 8/6/2024    Last IBD Health Maintenance Review: 10/6/2023  PDC: 100%    IBD Health Maintenance    Vaccinations:  All patients on biologics should avoid live vaccines unless specifically indicated.    -- Influenza (every year)- last 9/2023  -- TdaP (every 10 years)- last 12/2019  -- Pneumococcal Pneumonia    Prevnar-13: 7/25/2018   Pneumovax-23: 9/25/2018   Prevnar-20: 7/11/2022  -- COVID-19 4/2021, 4/2021, 9/2021, 2/2022, 9/2022, 4/2023, 9/2023    One time confirmation of immunity or serologies:  -- Hepatitis A (serologies or immunizations)- 3/2009, 12/2019  -- Hepatitis B (serologies or immunizations)- serology indicate immunity 2021  -- Varicella/Zoster    Varicella/Zoster- Shingrix 4/2023, 9/2023  -- MMR- 7/1991, 5/1985  -- Meningococcal meningitis (all patients at risk for meningitis)-- Menactra 3/2009    Due to the immunosuppression in this patient, I would not advise administration of live vaccines such as varicella/VZV, intranasal influenza, MMR, or yellow fever vaccine (if traveling).      Immunosuppressive Screening:  -- Hep B Surface Antibody serologies indicate immunity 8/25/2021  -- Hep B Surface Antigen non-reactive 8/25/2021  -- Hep B Core Antibody non-reactive 8/25/2021  -- Hep C Antibody non-reactive 10/26/2023  -- Yearly assessment of TB not done; negative 4/24/2023    Lab Results   Component Value Date    AUSAB 740.94 (H) 08/25/2021    HEPBANG Nonreactive 08/25/2021    HBCAB Nonreactive 08/25/2021    HCVAB Nonreactive 10/26/2023    TBRES Negative 04/24/2023       Bone mineral density screening   -- Recommend all patients supplement with calcium and vitamin D  -- Last DXA 6/9/2023  -- Follows with Endocrinology, recommendations from 10/11/2023 visit note \"General management-strengthening exercises, fall " "prevention, calcium 1200 mg daily from all sources and continue current vitamin D.  Check a follow-up bone density in 1 year.\"      Cancer Screening:  Colon cancer screening:  Given colonic involvement, recommend patient undergo regular dysplasia surveillance   Next dysplasia screening is recommended 2025.     Cervical cancer screening: Annual due to immunosupression     Skin cancer screening: Annual visual exam of skin by dermatologist since patient is immunocompromised- last 10/2022    Depression Screening:    PHQ-2 Score:         4/18/2024     2:04 PM 10/31/2023     1:59 PM   PHQ-2 ( 1999 Pfizer)   Q1: Little interest or pleasure in doing things 0 0   Q2: Feeling down, depressed or hopeless 0 0   PHQ-2 Score 0 0   Q1: Little interest or pleasure in doing things Not at all    Q2: Feeling down, depressed or hopeless Not at all    PHQ-2 Score 0        Research:  Are you interested in being contacted about enrollment in clinical research studies? Yes    Would you like to receive a quarterly newsletter on research via email. YES [email or web address]        Misc:  -- Avoid tobacco use  -- Avoid NSAIDs as there is potentially a 25% chance of causing an IBD flare    Supplements:   Calcium citrate 500 mg once daily    Denies side effects or concerns. She tried twice daily dosing but has a hard time remembering. Believes she gets a good amount of calcium diet.     ----------------      I spent 22 minutes with this patient today. All changes were made via collaborative practice agreement with Ruperto Cain MD. A copy of the visit note was provided to the patient's provider(s).    A summary of these recommendations was sent via [employer].    Claudio Vera, PharmD, BCPS  MTM Pharmacist   [facility]  Phone: [phone]    Telemedicine Visit Details  Type of service:  Video Conference via [employer]  Start Time:  1:02 PM  End Time:  1:24 PM     Medication Therapy Recommendations  No medication therapy " recommendations to display

## 2024-05-21 NOTE — PATIENT INSTRUCTIONS
"Recommendations from today's MTM visit:                                                      You are due for annual tuberculosis screening. You can have this completed with your next routine labs.   Reminder to schedule a routine skin check, you can call [phone] to schedule an appointment.  You are up to date on vaccines.  Reminder to discuss with your primary care provider when you are due for your next routine cervical cancer screening.  Reminder to schedule your DEXA scan. This has been ordered by Dr. Stefanie Esquivel MD. To schedule the DEXA call the imaging schedulers at [phone]. For an out-of-pocket estimate, call the Cost of Care estimate line at [phone].  I recommend a calcium goal of 1200 mg/day between diet and supplementation. Please review the foods you eat and see if you are getting at least 1200 mg/day of calcium from your diet. If you are not getting adequate calcium from your diet then consider adding additional supplementation (such as twice daily calcium). https://www.dietaryguidelines.gov/food-sources-calcium    Follow-up: 11/19/2024 at 3:00 PM (video)    It was great speaking with you today.  I value your experience and would be very thankful for your time in providing feedback in our clinic survey. In the next few days, you may receive an email or text message from [employer] with a link to a survey related to your  clinical pharmacist.\"     To schedule another MTM appointment, please call the clinic directly or you may call the MTM scheduling line at [phone] or toll-free at [phone].     My Clinical Pharmacist's contact information:                                                      Please feel free to contact me with any questions or concerns you have.      Claudio Vera, PharmD, BCPS  MTM Pharmacist   [facility]  Phone: [phone]   "

## 2024-07-11 ENCOUNTER — ANCILLARY PROCEDURE (OUTPATIENT)
Dept: BONE DENSITY | Facility: CLINIC | Age: 54
End: 2024-07-11
Attending: INTERNAL MEDICINE
Payer: COMMERCIAL

## 2024-07-11 DIAGNOSIS — M85.89 OSTEOPENIA OF MULTIPLE SITES: ICD-10-CM

## 2024-07-11 PROCEDURE — 77080 DXA BONE DENSITY AXIAL: CPT | Performed by: INTERNAL MEDICINE

## 2024-08-01 NOTE — PROGRESS NOTES
08/01/24 3:26 PM  PATIENT LAB/IMAGING STATUS : Orders completed / No further action needed  Joan Brooks

## 2024-08-04 ENCOUNTER — NURSE TRIAGE (OUTPATIENT)
Dept: NURSING | Facility: CLINIC | Age: 54
End: 2024-08-04
Payer: COMMERCIAL

## 2024-08-04 NOTE — TELEPHONE ENCOUNTER
Pt is phoning stating that she was on vacation and hit her left shin very hard on Monday 07/29/2024    Pt states that she scraped and area on her left shin which is now scabbed     Pt states that her discomfort is getting worse     Rates pain 3/10    Pt states that she had a bruise that she feels may have been about 2 inches, bruise has now faded     Pt is currently taking Stelara     Per disposition: See PCP Within 24 Hours     [facility] an [facility] and if no appointments available, pt will go [place] [facility] [place]     Care advice given per protocol and when to call back. Pt verbalized understanding and agrees to plan of care.    Lara Washburn RN  [facility] Nurse Advisor  9:14 AM 8/4/2024            Reason for Disposition   [1] HIV positive or severe immunodeficiency (severely weak immune system) AND [2] DIRTY cut or scrape    Additional Information   Negative: Serious injury with multiple fractures (broken bones)   Negative: [1] Major bleeding (e.g., actively dripping or spurting) AND [2] can't be stopped   Negative: Bullet wound, stabbed by knife, or other serious penetrating wound   Negative: Looks like a dislocated joint (crooked or deformed)   Negative: Can't stand (bear weight) or walk   Negative: Sounds like a life-threatening emergency to the triager   Negative: Wound looks infected   Negative: Leg pain from overuse (e.g., sports, running, physical work)   Negative: Leg pain not from an injury   Negative: Injury mainly to the hip   Negative: Injury mainly to knee   Negative: Injury mainly to foot or ankle   Negative: Skin is split open or gaping (or length > 1/2 inch or 12 mm)   Negative: [1] Bleeding AND [2] won't stop after 10 minutes of direct pressure (using correct technique)   Negative: [1] Dirt in the wound AND [2] not removed with 15 minutes of scrubbing   Negative: Sounds like a serious injury to the triager   Negative: [1] SEVERE pain (e.g., excruciating pain,  unable to do any normal activities)  AND [2] not improved 2 hours after pain medicine/ice packs   Negative: Suspicious history for the injury   Negative: Large swelling or bruise > 2 inches (5 cm)   Negative: [1] High-risk adult (e.g., age > 60 years, osteoporosis, chronic steroid use) AND [2] limping   Negative: [1] No prior tetanus shots (or is not fully vaccinated) AND [2] any wound (e.g., cut, scrape)    Protocols used: Leg Injury-A-AH

## 2024-08-05 ENCOUNTER — OFFICE VISIT (OUTPATIENT)
Dept: INTERNAL MEDICINE | Facility: CLINIC | Age: 54
End: 2024-08-05
Payer: COMMERCIAL

## 2024-08-05 VITALS
SYSTOLIC BLOOD PRESSURE: 128 MMHG | HEART RATE: 62 BPM | DIASTOLIC BLOOD PRESSURE: 82 MMHG | HEIGHT: 64 IN | WEIGHT: 129 LBS | TEMPERATURE: 97.9 F | BODY MASS INDEX: 22.02 KG/M2 | OXYGEN SATURATION: 97 %

## 2024-08-05 DIAGNOSIS — S89.92XA SHIN INJURY, LEFT, INITIAL ENCOUNTER: Primary | ICD-10-CM

## 2024-08-05 DIAGNOSIS — T14.8XXA HEMATOMA OF SKIN: ICD-10-CM

## 2024-08-05 PROCEDURE — 99213 OFFICE O/P EST LOW 20 MIN: CPT | Performed by: PHYSICIAN ASSISTANT

## 2024-08-05 NOTE — PROGRESS NOTES
"  Assessment & Plan     Shin injury, left, initial encounter      Hematoma of skin    Reassurance given   Reviewed need to rest the area and stop with extra walking and exercises that are using the lower leg for this week  Reviewed healing time  Icing and some heat at this point                Subjective   Ceci is a 53 year old, presenting for the following health issues:  Trauma  Unable to take ibuprofen due to allergy, took tylenol did not help  Was walking after injury and was fine   Okay to walk just noticeable that something is wrong   Yesterday was icing and elevating and rest   History of Present Illness       Reason for visit:  Shin injury  Symptom onset:  3-7 days ago  Symptoms include:  Swelling, pain  Symptom intensity:  Moderate  Symptom progression:  Worsening  Had these symptoms before:  No  What makes it worse:  Putting weight on it  What makes it better:  Icing it    She eats 4 or more servings of fruits and vegetables daily.She consumes 0 sweetened beverage(s) daily.She exercises with enough effort to increase her heart rate 30 to 60 minutes per day.  She exercises with enough effort to increase her heart rate 4 days per week.   She is taking medications regularly.   Injury last week at the start of a trip in [place]. Hit her shin. She did ice it while on vacation, but she also continued to walk a lot   11 miles just at the end of last week.   Notes swelling and bruising is down but some mild pain noted yet                   Objective    /82   Pulse 62   Temp 97.9  F (36.6  C) (Temporal)   Ht 1.62 m (5' 3.78\")   Wt 58.5 kg (129 lb)   SpO2 97%   BMI 22.30 kg/m    Body mass index is 22.3 kg/m .  Physical Exam   GENERAL: alert and no distress  MS: left lower leg anterior mid tibial surface   Hematoma noted under the skin   Some mild tenderness   No redness or sign of infection  Calf soft and nontender   SKIN: no suspicious lesions or rashes and small healing abrasion in the area of the " injury             Signed Electronically by: Tricia Mistry PA-C

## 2024-08-13 ENCOUNTER — LAB (OUTPATIENT)
Dept: LAB | Facility: CLINIC | Age: 54
End: 2024-08-13
Payer: COMMERCIAL

## 2024-08-13 DIAGNOSIS — K50.80 CROHN'S DISEASE OF BOTH SMALL AND LARGE INTESTINE WITHOUT COMPLICATION (H): ICD-10-CM

## 2024-08-13 LAB
ALBUMIN SERPL BCG-MCNC: 4.4 G/DL (ref 3.5–5.2)
ALP SERPL-CCNC: 59 U/L (ref 40–150)
ALT SERPL W P-5'-P-CCNC: 33 U/L (ref 0–50)
AST SERPL W P-5'-P-CCNC: 27 U/L (ref 0–45)
BASOPHILS # BLD AUTO: 0 10E3/UL (ref 0–0.2)
BASOPHILS NFR BLD AUTO: 0 %
BILIRUB DIRECT SERPL-MCNC: <0.2 MG/DL (ref 0–0.3)
BILIRUB SERPL-MCNC: 0.2 MG/DL
CRP SERPL-MCNC: <3 MG/L
EOSINOPHIL # BLD AUTO: 0 10E3/UL (ref 0–0.7)
EOSINOPHIL NFR BLD AUTO: 1 %
ERYTHROCYTE [DISTWIDTH] IN BLOOD BY AUTOMATED COUNT: 11.8 % (ref 10–15)
ERYTHROCYTE [SEDIMENTATION RATE] IN BLOOD BY WESTERGREN METHOD: 17 MM/HR (ref 0–30)
HCT VFR BLD AUTO: 36.6 % (ref 35–47)
HGB BLD-MCNC: 12 G/DL (ref 11.7–15.7)
IMM GRANULOCYTES # BLD: 0 10E3/UL
IMM GRANULOCYTES NFR BLD: 0 %
LYMPHOCYTES # BLD AUTO: 1.9 10E3/UL (ref 0.8–5.3)
LYMPHOCYTES NFR BLD AUTO: 28 %
MCH RBC QN AUTO: 31.2 PG (ref 26.5–33)
MCHC RBC AUTO-ENTMCNC: 32.8 G/DL (ref 31.5–36.5)
MCV RBC AUTO: 95 FL (ref 78–100)
MONOCYTES # BLD AUTO: 0.3 10E3/UL (ref 0–1.3)
MONOCYTES NFR BLD AUTO: 5 %
NEUTROPHILS # BLD AUTO: 4.6 10E3/UL (ref 1.6–8.3)
NEUTROPHILS NFR BLD AUTO: 66 %
PLATELET # BLD AUTO: 259 10E3/UL (ref 150–450)
PROT SERPL-MCNC: 7.2 G/DL (ref 6.4–8.3)
RBC # BLD AUTO: 3.85 10E6/UL (ref 3.8–5.2)
WBC # BLD AUTO: 6.9 10E3/UL (ref 4–11)

## 2024-08-13 PROCEDURE — 80076 HEPATIC FUNCTION PANEL: CPT

## 2024-08-13 PROCEDURE — 85025 COMPLETE CBC W/AUTO DIFF WBC: CPT

## 2024-08-13 PROCEDURE — 85652 RBC SED RATE AUTOMATED: CPT

## 2024-08-13 PROCEDURE — 36415 COLL VENOUS BLD VENIPUNCTURE: CPT

## 2024-08-13 PROCEDURE — 86140 C-REACTIVE PROTEIN: CPT

## 2024-08-21 ENCOUNTER — OFFICE VISIT (OUTPATIENT)
Dept: ENDOCRINOLOGY | Facility: CLINIC | Age: 54
End: 2024-08-21
Payer: COMMERCIAL

## 2024-08-21 VITALS
OXYGEN SATURATION: 99 % | HEART RATE: 63 BPM | DIASTOLIC BLOOD PRESSURE: 71 MMHG | BODY MASS INDEX: 22.5 KG/M2 | HEIGHT: 63 IN | SYSTOLIC BLOOD PRESSURE: 115 MMHG | WEIGHT: 127 LBS

## 2024-08-21 DIAGNOSIS — M85.89 OSTEOPENIA OF MULTIPLE SITES: Primary | ICD-10-CM

## 2024-08-21 PROCEDURE — G2211 COMPLEX E/M VISIT ADD ON: HCPCS | Performed by: INTERNAL MEDICINE

## 2024-08-21 PROCEDURE — 99214 OFFICE O/P EST MOD 30 MIN: CPT | Performed by: INTERNAL MEDICINE

## 2024-08-21 ASSESSMENT — PAIN SCALES - GENERAL: PAINLEVEL: NO PAIN (0)

## 2024-08-21 NOTE — PROGRESS NOTES
Endocrinology Clinic Visit    Chief Complaint: Osteoporosis (Review dexa scan)     Information obtained from:Patient      Assessment/Treatment Plan:      Osteopenia at multiple sites    I have personally reviewed DEXA scan from 7/11/2024 and agree with the interpretation of lumbar spine T score -1.5, left femoral neck and right femoral neck T-scores -2.2 and -2.2 respectively, left and right total hip T score -1.6 and -1.2 respectively.  The FRAX score is 6.2% for major osteoporotic fracture and 1.1% for hip fracture and bone specific therapy is not recommended.  Risk factor for osteoporosis seems to be Crohn's, family history & body weight.  To assess for other secondary causes, screen for celiac disease, hypo or hypercalciuria, parathyroid hormone and other routine labs done and were unremarkable.  General management-strengthening exercises, fall prevention, calcium 1200 mg daily from all sources and continue current vitamin D.  Follow up DEXA in 2 years with follow up at that time.     Test and/or medications prescribed today:  Orders Placed This Encounter   Procedures    DX Bone Density    Vitamin D Deficiency (D3 Only)       Stefanie Esquivel MD  Staff Endocrinologist    Division of Endocrinology and Diabetes      Subjective:         HPI: Ceci Lopez is a 53 year old female with history of osteopenia who is here for routine follow up.   Fracture risk and osteoporosis  Previous fracture after the age of 50: no   Age >65 - no   Loss of height - no   Low body mass index; weight less than 127 pounds - 120-122. 7-8 years ago lost 65 pounds (cut back on alcohol, less carbs).   Family/Parental history of hip fracture/Osteoporosis: Mom , fall wrist fracture.   Smoking: no smoking    Early menopause: no   Previous fragility fracture, morphometric vertebral fracture:no   Current glucocorticoid treatment: >5 mg prednisone for 3 mo or longer; current or previously; no.  brief steroid for crohn's, short dose.  "  Excessive Alcohol intake: not right now. In the past has been drinking wine most days of the week.    Falls/risk factors     Fracture hx: no   Chronic glucocorticoid use: no   Previous osteoporosis treatment(s): no   Hx of hypercalcemia: no   Hx of nephrolithiasis: no   Menopause:no   Estrogen Post menopause:      Contraindications to osteoporosis treatment options:             No  history of external beam radiation therapy  Evenity:            No MI or CVA within the last 12 months   Spinning class, an hour, twice a week, weight, an hour, core, yoga, 2X, walking  No upcoming invasive dental work.    Calcium citrate 500 mg (used to 1 tablet per day; now 2 tablets per day/ no dairy products - oat milk - 8 oz - 12 oz) and vitamin D intake 50 mcg daily   Exercise -  an hour long spinning classes daily, yoga, strengthening exercise, walking   Crohn's - flare up - 5 years ago, May 2018,     General Symptoms: No  Skin Symptoms: No  HENT Symptoms: No  EYE SYMPTOMS: No  HEART SYMPTOMS: No  LUNG SYMPTOMS: No  INTESTINAL SYMPTOMS: No  URINARY SYMPTOMS: No  GYNECOLOGIC SYMPTOMS: No  BREAST SYMPTOMS: No  SKELETAL SYMPTOMS: Yes  BLOOD SYMPTOMS: No  NERVOUS SYSTEM SYMPTOMS: No  MENTAL HEALTH SYMPTOMS: No  Please answer the questions below to tell us what condition you are experiencing: (Submitted on 10/4/2023)  Back pain: No  Muscle aches: No  Neck pain: No  Swollen joints: No  Joint pain: Yes  Bone pain: No  Muscle cramps: No  Muscle weakness: No  Joint stiffness: No  Bone fracture: No    \"Impression  The most negative and valid T-score of -2.2 at the level of the left femoral neck and right femoral neck corresponds with low bone density according to WHO criteria for postmenopausal females and men age 50 and over.      Results   Lumbar spine   T-score -1.5 , BMD 0.994 g/cm2.      Left Femoral  neck  T-score -2.2 , BMD 0.727 g/cm2.  Left Total hip  T-score -1.6, BMD 0.810 g/cm2.     Right Femoral neck  T-score -2.2, BMD  0.734 " "g/cm2.  Right Total hip  T-score -1.2 , BMD  0.855 g/cm2.     Interval change  There was no change at the areas of interest compared to the prior study.     Fracture risk   The estimated 10-year risk for a major osteoporotic fracture is 6.2% and for a hip fracture is 1.1%. FRAX was calculated based on information provided by the patient on the DXA questionnaire. \"        Allergies   Allergen Reactions    Ibuprofen Other (See Comments)     Causes colitis flare ups.  Patient does not want to take this medication.    Erythromycin      PN: LW Reaction: Vomiting       Current Outpatient Medications   Medication Sig Dispense Refill    acyclovir (ZOVIRAX) 400 MG tablet Take 1 tablet (400 mg) by mouth every 8 hours (Patient taking differently: Take 400 mg by mouth every 8 hours. Uses for 5 days at a time as needed) 15 tablet 2    CALCIUM CITRATE PO Take 500 mg by mouth 2 times daily. 2 tablets twice daily most days (a total of 1000 mg).      FERROUS BISGLYCINATE CHELATE PO Take 1 tablet by mouth [employer] Blood Builder - Vitamin C, B12, Folic acid, Ferrous bisglycinate      STELARA 90 MG/ML INJECT THE CONTENTS OF 1 SYRINGE UNDER THE SKIN EVERY 8 WEEKS 1 mL 2    Vitamin D3 (CHOLECALCIFEROL) 25 mcg (1000 units) tablet Take 50 mcg by mouth daily         Review of Systems     8 point review system (Constitutional, HENT, Eyes, Respiratory, Cardiovascular, Gastrointestinal, Genitourinary, Musculoskeletal,Neurological, Psychiatric/Behavioural, Endocrine) is negative or is as per HPI above.  Pertinent to the visit past medical history, past surgical history, family history and social history reviewed.  Objective:   /71   Pulse 63   Ht 1.6 m (5' 3\")   Wt 57.6 kg (127 lb)   LMP 06/20/2024 (Approximate)   SpO2 99%   BMI 22.50 kg/m    Constitutional: Pleasant no acute cardiopulmonary distress.   EYES: anicteric, normal extra-ocular movements, no lid lag or retraction, is equal and reactive to light bilaterally.  HEENT: " Mouth/Throat: Mucous membrane is moist. Oropharynx is clear.  Thyroid examination: no nodules appreciated.   Cardiovascular: RRR, S1, S2 normal.   Pulmonary/Chest: CTAB. No wheezing or rales.   Abdominal: +BS. Non tender to palpation.  Stretch marks: none.   Neurological: Alert and oriented.  No tremor and reflexes are symmetrical bilaterally and within the normal limits. Muscle strength 5/5.   Extremities: No edema.  Spine : no point tenderness.   Psychological: appropriate mood and affect       In House Labs:   Component      Latest Ref Rng 10/26/2023  1:37 PM   Calcium Urine mg/dL      mg/dL 2.4    Duration in hours      h 24.0    Duration in hours      h 24.0    Volume in mL      mL 5,400    Volume in mL      mL 5,400    Calcium Urine g/24 h      0.10 - 0.30 g/spec 0.13    Creatinine Urine      mg/dL 20.2    Creatinine Urine Timed      0.72 - 1.51 g/spec 1.09    Creatinine      0.51 - 0.95 mg/dL 0.72    GFR Estimate      >60 mL/min/1.73m2 >90    Tissue Transglutaminase Antibody IgA      <7.0 U/mL 0.7    Tissue Transglutaminase Tessa IgG      <7.0 U/mL 0.9    Calcium      8.6 - 10.0 mg/dL 8.6    Parathyroid Hormone Intact      15 - 65 pg/mL 35    Phosphorus      2.5 - 4.5 mg/dL 4.0    Urea Nitrogen      6.0 - 20.0 mg/dL 7.4    TSH      0.30 - 4.20 uIU/mL 1.19        TSH   Date Value Ref Range Status   10/26/2023 1.19 0.30 - 4.20 uIU/mL Final   08/25/2021 0.86 0.40 - 4.00 mU/L Final       Creatinine   Date Value Ref Range Status   10/26/2023 0.72 0.51 - 0.95 mg/dL Final     This note has been dictated using voice recognition software.  As a result, there may be errors in the documentation that have gone undetected.  Please consider this when interpreting information in this documentation.   The longitudinal plan of care for the diagnosis(es)/condition(s) as documented were addressed during this visit. Due to the added complexity in care, I will continue to support Ceci in the subsequent management and with ongoing  continuity of care.

## 2024-08-21 NOTE — LETTER
8/21/2024       RE: Ceci Lopez  [address] [place] [id number]     Dear Colleague,    Thank you for referring your patient, Ceci Lopez, to the [facility] [place] at [facility]. Please see a copy of my visit note below.    08/01/24 3:26 PM  PATIENT LAB/IMAGING STATUS : Orders completed / No further action needed  Joan Brooks         Endocrinology Clinic Visit    Chief Complaint: Osteoporosis (Review dexa scan)     Information obtained from:Patient      Assessment/Treatment Plan:      Osteopenia at multiple sites    I have personally reviewed DEXA scan from 7/11/2024 and agree with the interpretation of lumbar spine T score -1.5, left femoral neck and right femoral neck T-scores -2.2 and -2.2 respectively, left and right total hip T score -1.6 and -1.2 respectively.  The FRAX score is 6.2% for major osteoporotic fracture and 1.1% for hip fracture and bone specific therapy is not recommended.  Risk factor for osteoporosis seems to be Crohn's, family history & body weight.  To assess for other secondary causes, screen for celiac disease, hypo or hypercalciuria, parathyroid hormone and other routine labs done and were unremarkable.  General management-strengthening exercises, fall prevention, calcium 1200 mg daily from all sources and continue current vitamin D.  Follow up DEXA in 2 years with follow up at that time.     Test and/or medications prescribed today:  Orders Placed This Encounter   Procedures     DX Bone Density     Vitamin D Deficiency (D3 Only)       Stefanie Esquivel MD  Staff Endocrinologist    Division of Endocrinology and Diabetes      Subjective:         HPI: Ceci Lopez is a 53 year old female with history of osteopenia who is here for routine follow up.   Fracture risk and osteoporosis  Previous fracture after the age of 50: no   Age >65 - no   Loss of height - no   Low body mass index; weight less  "than 127 pounds - 120-122. 7-8 years ago lost 65 pounds (cut back on alcohol, less carbs).   Family/Parental history of hip fracture/Osteoporosis: Mom , fall wrist fracture.   Smoking: no smoking    Early menopause: no   Previous fragility fracture, morphometric vertebral fracture:no   Current glucocorticoid treatment: >5 mg prednisone for 3 mo or longer; current or previously; no.  brief steroid for crohn's, short dose.   Excessive Alcohol intake: not right now. In the past has been drinking wine most days of the week.    Falls/risk factors     Fracture hx: no   Chronic glucocorticoid use: no   Previous osteoporosis treatment(s): no   Hx of hypercalcemia: no   Hx of nephrolithiasis: no   Menopause:no   Estrogen Post menopause:      Contraindications to osteoporosis treatment options:             No  history of external beam radiation therapy  Evenity:            No MI or CVA within the last 12 months   Spinning class, an hour, twice a week, weight, an hour, core, yoga, 2X, walking  No upcoming invasive dental work.    Calcium citrate 500 mg (used to 1 tablet per day; now 2 tablets per day/ no dairy products - oat milk - 8 oz - 12 oz) and vitamin D intake 50 mcg daily   Exercise -  an hour long spinning classes daily, yoga, strengthening exercise, walking   Crohn's - flare up - 5 years ago, May 2018,     General Symptoms: No  Skin Symptoms: No  HENT Symptoms: No  EYE SYMPTOMS: No  HEART SYMPTOMS: No  LUNG SYMPTOMS: No  INTESTINAL SYMPTOMS: No  URINARY SYMPTOMS: No  GYNECOLOGIC SYMPTOMS: No  BREAST SYMPTOMS: No  SKELETAL SYMPTOMS: Yes  BLOOD SYMPTOMS: No  NERVOUS SYSTEM SYMPTOMS: No  MENTAL HEALTH SYMPTOMS: No  Please answer the questions below to tell us what condition you are experiencing: (Submitted on 10/4/2023)  Back pain: No  Muscle aches: No  Neck pain: No  Swollen joints: No  Joint pain: Yes  Bone pain: No  Muscle cramps: No  Muscle weakness: No  Joint stiffness: No  Bone fracture: No    \"Impression  The most " "negative and valid T-score of -2.2 at the level of the left femoral neck and right femoral neck corresponds with low bone density according to WHO criteria for postmenopausal females and men age 50 and over.      Results   Lumbar spine   T-score -1.5 , BMD 0.994 g/cm2.      Left Femoral  neck  T-score -2.2 , BMD 0.727 g/cm2.  Left Total hip  T-score -1.6, BMD 0.810 g/cm2.     Right Femoral neck  T-score -2.2, BMD  0.734 g/cm2.  Right Total hip  T-score -1.2 , BMD  0.855 g/cm2.     Interval change  There was no change at the areas of interest compared to the prior study.     Fracture risk   The estimated 10-year risk for a major osteoporotic fracture is 6.2% and for a hip fracture is 1.1%. FRAX was calculated based on information provided by the patient on the DXA questionnaire. \"        Allergies   Allergen Reactions     Ibuprofen Other (See Comments)     Causes colitis flare ups.  Patient does not want to take this medication.     Erythromycin      PN: LW Reaction: Vomiting       Current Outpatient Medications   Medication Sig Dispense Refill     acyclovir (ZOVIRAX) 400 MG tablet Take 1 tablet (400 mg) by mouth every 8 hours (Patient taking differently: Take 400 mg by mouth every 8 hours. Uses for 5 days at a time as needed) 15 tablet 2     CALCIUM CITRATE PO Take 500 mg by mouth 2 times daily. 2 tablets twice daily most days (a total of 1000 mg).       FERROUS BISGLYCINATE CHELATE PO Take 1 tablet by mouth [employer] Blood Builder - Vitamin C, B12, Folic acid, Ferrous bisglycinate       STELARA 90 MG/ML INJECT THE CONTENTS OF 1 SYRINGE UNDER THE SKIN EVERY 8 WEEKS 1 mL 2     Vitamin D3 (CHOLECALCIFEROL) 25 mcg (1000 units) tablet Take 50 mcg by mouth daily         Review of Systems     8 point review system (Constitutional, HENT, Eyes, Respiratory, Cardiovascular, Gastrointestinal, Genitourinary, Musculoskeletal,Neurological, Psychiatric/Behavioural, Endocrine) is negative or is as per HPI above.  Pertinent to the " "visit past medical history, past surgical history, family history and social history reviewed.  Objective:   /71   Pulse 63   Ht 1.6 m (5' 3\")   Wt 57.6 kg (127 lb)   LMP 06/20/2024 (Approximate)   SpO2 99%   BMI 22.50 kg/m    Constitutional: Pleasant no acute cardiopulmonary distress.   EYES: anicteric, normal extra-ocular movements, no lid lag or retraction, is equal and reactive to light bilaterally.  HEENT: Mouth/Throat: Mucous membrane is moist. Oropharynx is clear.  Thyroid examination: no nodules appreciated.   Cardiovascular: RRR, S1, S2 normal.   Pulmonary/Chest: CTAB. No wheezing or rales.   Abdominal: +BS. Non tender to palpation.  Stretch marks: none.   Neurological: Alert and oriented.  No tremor and reflexes are symmetrical bilaterally and within the normal limits. Muscle strength 5/5.   Extremities: No edema.  Spine : no point tenderness.   Psychological: appropriate mood and affect       In House Labs:   Component      Latest Ref Rn 10/26/2023  1:37 PM   Calcium Urine mg/dL      mg/dL 2.4    Duration in hours      h 24.0    Duration in hours      h 24.0    Volume in mL      mL 5,400    Volume in mL      mL 5,400    Calcium Urine g/24 h      0.10 - 0.30 g/spec 0.13    Creatinine Urine      mg/dL 20.2    Creatinine Urine Timed      0.72 - 1.51 g/spec 1.09    Creatinine      0.51 - 0.95 mg/dL 0.72    GFR Estimate      >60 mL/min/1.73m2 >90    Tissue Transglutaminase Antibody IgA      <7.0 U/mL 0.7    Tissue Transglutaminase Tessa IgG      <7.0 U/mL 0.9    Calcium      8.6 - 10.0 mg/dL 8.6    Parathyroid Hormone Intact      15 - 65 pg/mL 35    Phosphorus      2.5 - 4.5 mg/dL 4.0    Urea Nitrogen      6.0 - 20.0 mg/dL 7.4    TSH      0.30 - 4.20 uIU/mL 1.19        TSH   Date Value Ref Range Status   10/26/2023 1.19 0.30 - 4.20 uIU/mL Final   08/25/2021 0.86 0.40 - 4.00 mU/L Final       Creatinine   Date Value Ref Range Status   10/26/2023 0.72 0.51 - 0.95 mg/dL Final     This note has been " dictated using voice recognition software.  As a result, there may be errors in the documentation that have gone undetected.  Please consider this when interpreting information in this documentation.   The longitudinal plan of care for the diagnosis(es)/condition(s) as documented were addressed during this visit. Due to the added complexity in care, I will continue to support Ceci in the subsequent management and with ongoing continuity of care.        Again, thank you for allowing me to participate in the care of your patient.      Sincerely,    Stefanie Esquivel MD

## 2024-08-21 NOTE — NURSING NOTE
"Chief Complaint   Patient presents with    Osteoporosis     Review dexa scan     Vital signs:      BP: 115/71 Pulse: 63     SpO2: 99 %     Height: 160 cm (5' 3\") Weight: 57.6 kg (127 lb)  Estimated body mass index is 22.5 kg/m  as calculated from the following:    Height as of this encounter: 1.6 m (5' 3\").    Weight as of this encounter: 57.6 kg (127 lb).        "

## 2024-08-21 NOTE — PATIENT INSTRUCTIONS
Weight bearing Exercises  Fall prevention  Adequate Calcium and vitamin D intake: for maintenance calcium 1200 mg daily from all sources and vitamin D 2000 IU daily.    Orders Placed This Encounter   Procedures    DX Bone Density    Vitamin D Deficiency (D3 Only)

## 2024-08-28 PROBLEM — K52.9 COLITIS: Status: ACTIVE | Noted: 2017-10-09

## 2024-08-28 PROBLEM — E78.5 HYPERLIPIDEMIA: Status: ACTIVE | Noted: 2019-09-05

## 2024-08-28 PROBLEM — K51.90 ULCERATIVE COLITIS (H): Status: ACTIVE | Noted: 2018-05-15

## 2024-08-28 PROBLEM — K56.609 SBO (SMALL BOWEL OBSTRUCTION) (H): Status: ACTIVE | Noted: 2020-08-04

## 2024-08-29 ENCOUNTER — OFFICE VISIT (OUTPATIENT)
Dept: OBGYN | Facility: CLINIC | Age: 54
End: 2024-08-29
Attending: OBSTETRICS & GYNECOLOGY
Payer: COMMERCIAL

## 2024-08-29 VITALS
HEIGHT: 63 IN | WEIGHT: 126.8 LBS | SYSTOLIC BLOOD PRESSURE: 121 MMHG | HEART RATE: 64 BPM | BODY MASS INDEX: 22.47 KG/M2 | DIASTOLIC BLOOD PRESSURE: 78 MMHG

## 2024-08-29 DIAGNOSIS — Z12.4 SCREENING FOR MALIGNANT NEOPLASM OF CERVIX: ICD-10-CM

## 2024-08-29 DIAGNOSIS — Z13.1 SCREENING FOR DIABETES MELLITUS: ICD-10-CM

## 2024-08-29 DIAGNOSIS — Z13.220 SCREENING FOR LIPID DISORDERS: ICD-10-CM

## 2024-08-29 DIAGNOSIS — N95.1 MENOPAUSAL SYNDROME (HOT FLASHES): ICD-10-CM

## 2024-08-29 DIAGNOSIS — Z00.00 PREVENTATIVE HEALTH CARE: Primary | ICD-10-CM

## 2024-08-29 PROCEDURE — 99213 OFFICE O/P EST LOW 20 MIN: CPT | Mod: 25 | Performed by: OBSTETRICS & GYNECOLOGY

## 2024-08-29 PROCEDURE — 99386 PREV VISIT NEW AGE 40-64: CPT | Performed by: OBSTETRICS & GYNECOLOGY

## 2024-08-29 PROCEDURE — G0145 SCR C/V CYTO,THINLAYER,RESCR: HCPCS | Performed by: OBSTETRICS & GYNECOLOGY

## 2024-08-29 PROCEDURE — 87624 HPV HI-RISK TYP POOLED RSLT: CPT | Performed by: OBSTETRICS & GYNECOLOGY

## 2024-08-29 RX ORDER — PROGESTERONE 100 MG/1
100 CAPSULE ORAL DAILY
Qty: 90 CAPSULE | Refills: 3 | Status: SHIPPED | OUTPATIENT
Start: 2024-08-29

## 2024-08-29 RX ORDER — ESTRADIOL 0.04 MG/D
1 PATCH, EXTENDED RELEASE TRANSDERMAL
Qty: 24 PATCH | Refills: 3 | Status: SHIPPED | OUTPATIENT
Start: 2024-08-29

## 2024-08-29 NOTE — LETTER
8/29/2024       RE: Ceci Lopez  [address] [facility] [address]     Dear Colleague,    Thank you for referring your patient, eCci Lopez, to the [facility] [place] at [facility]. Please see a copy of my visit note below.    CC/HPI:   Ceci Lopez is a 53 year old female who presents today for preventative health care. She would also like to discuss hormone therapy.     She has a history of Chron's disease managed by Dr. Laura. Was recently found to have low bone density and has concerns about her bone health. She is perimenopausal, LMP In June 2024. Cycles have become more irregular every, sometimes monthly, often more spaced. She does not have any significant perimenopausal symptoms.     Has known history of fibroids which are not bothersome. She is otherwise healthy and active. Gets exercise, focuses on strength training and eats plant based diet.     HISTORIES:  Patient Active Problem List   Diagnosis     Crohn's disease (H)     Pelvic floor dysfunction     Female pelvic pain     Hyperlipidemia     SBO (small bowel obstruction) (H)     Colitis     Ulcerative colitis (H)     History reviewed. No pertinent past medical history.  Past Surgical History:   Procedure Laterality Date     COLONOSCOPY N/A 10/18/2021    Procedure: COLONOSCOPY, WITH BIOPSY;  Surgeon: Ruperto Cain MD;  [place] [facility] [place]     COLONOSCOPY N/A 1/9/2023    Procedure: COLONOSCOPY;  Surgeon: Ruperto Cain MD;  [place]: [facility] [place]     Current Outpatient Medications   Medication Sig Dispense Refill     acyclovir (ZOVIRAX) 400 MG tablet Take 1 tablet (400 mg) by mouth every 8 hours (Patient taking differently: Take 400 mg by mouth every 8 hours. Uses for 5 days at a time as needed) 15 tablet 2     CALCIUM CITRATE PO Take 500 mg by mouth 2 times daily. 2 tablets twice daily most days (a total of 1000 mg).       estradiol (VIVELLE-DOT) 0.0375  MG/24HR BIW patch Place 1 patch over 96 hours onto the skin twice a week. 24 patch 3     FERROUS BISGLYCINATE CHELATE PO Take 1 tablet by mouth Mega[employer] Blood Builder - Vitamin C, B12, Folic acid, Ferrous bisglycinate       progesterone (PROMETRIUM) 100 MG capsule Take 1 capsule (100 mg) by mouth daily. 90 capsule 3     STELARA 90 MG/ML INJECT THE CONTENTS OF 1 SYRINGE UNDER THE SKIN EVERY 8 WEEKS 1 mL 2     Vitamin D3 (CHOLECALCIFEROL) 25 mcg (1000 units) tablet Take 50 mcg by mouth daily       Allergies   Allergen Reactions     Ibuprofen Other (See Comments)     Causes colitis flare ups.  Patient does not want to take this medication.     Erythromycin      PN: LW Reaction: Vomiting     Social History     Socioeconomic History     Marital status:      Spouse name: Not on file     Number of children: Not on file     Years of education: Not on file     Highest education level: Not on file   Occupational History     Not on file   Tobacco Use     Smoking status: Never     Passive exposure: Never     Smokeless tobacco: Never   Vaping Use     Vaping status: Never Used   Substance and Sexual Activity     Alcohol use: Never     Drug use: Never     Sexual activity: Yes     Partners: Male     Birth control/protection: None       Family History   Problem Relation Age of Onset     Skin Cancer No family hx of      Melanoma No family hx of           Gyn Hx:   Patient's last menstrual period was 06/20/2024 (approximate).  As above  H/o LEEP age 28, last pap 5 years ago NIL/neg HPV    Review Of Systems:  CONSTITUTIONAL: NEGATIVE for fever, chills  EYES: NEGATIVE for vision changes   RESP: NEGATIVE for significant cough or SOB  CV: NEGATIVE for chest pain, palpitations   GI: NEGATIVE for nausea, abdominal pain, heartburn, or change in bowel habits  : NEGATIVE for frequency, dysuria, or hematuria  MUSCULOSKELETAL: NEGATIVE for significant arthralgias or myalgia  NEURO: NEGATIVE for weakness, dizziness or paresthesias or  "headache    EXAM:  /78   Pulse 64   Ht 1.6 m (5' 3\")   Wt 57.5 kg (126 lb 12.8 oz)   LMP 06/20/2024 (Approximate)   BMI 22.46 kg/m    Body mass index is 22.46 kg/m .    General - pleasant female in no acute distress.  Skin - no suspicious lesions or rashes  Breasts- symmetrical . No dominant fixed or suspicious masses noted.  No skin or nipple changes or axillary nodes.   Abdomen - soft, nontender, nondistended, no masses or organomegaly noted.  Pelvic - EG: normal  female, vulva reveals no erythema or lesions.   BUS: within normal limits.  Vagina: well rugated, no lesions polyps or suspicious  discharge.     Cervix: no lesions, polyps discharge or CMT.  Uterus: firm,  anteverted, normal size and nontender.  Adnexa: no masses or tenderness.  Anus- normal, no lesions.  Rectovaginal - deferred.    ASSESSMENT/PLAN  53 year old here for annual exam and discussion of bone health.     Reviewed HRT as option for management of bone health and perimenopause. Recommend continuing with vitamin D/calcium supplementation as well. Already doing other lifestyle management. Risk factors for osteoporosis- family history and Chron's disease (no chronic steroids) Will need repeat dexa in 2 years.     Reviewed risk and benefits of HRT including but not limited to, benefit of symptom management and bone health with risk fo VTE and small increased risk of breast cancer. Reviewed available routes of medication, will use vivelle dot and prometrium.   All questions answered.     HCM- pap smear with HPV today. Discussed that I would recommend routine screening no more than every 3 years due to immunosuppression.   Mammogram due 10/2024  Lipid panel and HgbA1C ordered- will have drawn with next labs.     RTC in 1 year or as needed.     Seema Gutierrez MD        Again, thank you for allowing me to participate in the care of your patient.      Sincerely,    Seema Gutierrez MD    "

## 2024-08-29 NOTE — PATIENT INSTRUCTIONS
Thank you for trusting us with your care!   Please be aware, if you are on Mychart, you may see your results prior to your providers review. If labs are abnormal, we will call or message you on [employer] with a follow up plan.    If you need to contact us for questions about:  Symptoms, Scheduling & Medical Questions; Non-urgent (2-3 day response) zappit message, Urgent (needing response today) [phone] (if after 3:30pm next day response)   Prescriptions: Please call your Pharmacy   Billing: [facility] [phone] or [facility]:[phone]

## 2024-08-30 LAB
HPV HR 12 DNA CVX QL NAA+PROBE: NEGATIVE
HPV16 DNA CVX QL NAA+PROBE: NEGATIVE
HPV18 DNA CVX QL NAA+PROBE: NEGATIVE
HUMAN PAPILLOMA VIRUS FINAL DIAGNOSIS: NORMAL

## 2024-09-03 NOTE — PROGRESS NOTES
CC/HPI:   Ceci Lopez is a 53 year old female who presents today for preventative health care. She would also like to discuss hormone therapy.     She has a history of Chron's disease managed by Dr. Laura. Was recently found to have low bone density and has concerns about her bone health. She is perimenopausal, LMP In June 2024. Cycles have become more irregular every, sometimes monthly, often more spaced. She does not have any significant perimenopausal symptoms.     Has known history of fibroids which are not bothersome. She is otherwise healthy and active. Gets exercise, focuses on strength training and eats plant based diet.     HISTORIES:  Patient Active Problem List   Diagnosis    Crohn's disease (H)    Pelvic floor dysfunction    Female pelvic pain    Hyperlipidemia    SBO (small bowel obstruction) (H)    Colitis    Ulcerative colitis (H)     History reviewed. No pertinent past medical history.  Past Surgical History:   Procedure Laterality Date    COLONOSCOPY N/A 10/18/2021    Procedure: COLONOSCOPY, WITH BIOPSY;  Surgeon: Ruperto Cain MD;  Location: [facility] [place]    COLONOSCOPY N/A 1/9/2023    Procedure: COLONOSCOPY;  Surgeon: Ruperto Cain MD;  Location: [facility] [place]     Current Outpatient Medications   Medication Sig Dispense Refill    acyclovir (ZOVIRAX) 400 MG tablet Take 1 tablet (400 mg) by mouth every 8 hours (Patient taking differently: Take 400 mg by mouth every 8 hours. Uses for 5 days at a time as needed) 15 tablet 2    CALCIUM CITRATE PO Take 500 mg by mouth 2 times daily. 2 tablets twice daily most days (a total of 1000 mg).      estradiol (VIVELLE-DOT) 0.0375 MG/24HR BIW patch Place 1 patch over 96 hours onto the skin twice a week. 24 patch 3    FERROUS BISGLYCINATE CHELATE PO Take 1 tablet by mouth [employer] Blood Builder - Vitamin C, B12, Folic acid, Ferrous bisglycinate      progesterone (PROMETRIUM) 100 MG capsule Take 1 capsule (100 mg) by mouth daily. 90 capsule 3     "STELARA 90 MG/ML INJECT THE CONTENTS OF 1 SYRINGE UNDER THE SKIN EVERY 8 WEEKS 1 mL 2    Vitamin D3 (CHOLECALCIFEROL) 25 mcg (1000 units) tablet Take 50 mcg by mouth daily       Allergies   Allergen Reactions    Ibuprofen Other (See Comments)     Causes colitis flare ups.  Patient does not want to take this medication.    Erythromycin      PN: LW Reaction: Vomiting     Social History     Socioeconomic History    Marital status:      Spouse name: Not on file    Number of children: Not on file    Years of education: Not on file    Highest education level: Not on file   Occupational History    Not on file   Tobacco Use    Smoking status: Never     Passive exposure: Never    Smokeless tobacco: Never   Vaping Use    Vaping status: Never Used   Substance and Sexual Activity    Alcohol use: Never    Drug use: Never    Sexual activity: Yes     Partners: Male     Birth control/protection: None       Family History   Problem Relation Age of Onset    Skin Cancer No family hx of     Melanoma No family hx of           Gyn Hx:   Patient's last menstrual period was 06/20/2024 (approximate).  As above  H/o LEEP age 28, last pap 5 years ago NIL/neg HPV    Review Of Systems:  CONSTITUTIONAL: NEGATIVE for fever, chills  EYES: NEGATIVE for vision changes   RESP: NEGATIVE for significant cough or SOB  CV: NEGATIVE for chest pain, palpitations   GI: NEGATIVE for nausea, abdominal pain, heartburn, or change in bowel habits  : NEGATIVE for frequency, dysuria, or hematuria  MUSCULOSKELETAL: NEGATIVE for significant arthralgias or myalgia  NEURO: NEGATIVE for weakness, dizziness or paresthesias or headache    EXAM:  /78   Pulse 64   Ht 1.6 m (5' 3\")   Wt 57.5 kg (126 lb 12.8 oz)   LMP 06/20/2024 (Approximate)   BMI 22.46 kg/m    Body mass index is 22.46 kg/m .    General - pleasant female in no acute distress.  Skin - no suspicious lesions or rashes  Breasts- symmetrical . No dominant fixed or suspicious masses noted.  No " skin or nipple changes or axillary nodes.   Abdomen - soft, nontender, nondistended, no masses or organomegaly noted.  Pelvic - EG: normal  female, vulva reveals no erythema or lesions.   BUS: within normal limits.  Vagina: well rugated, no lesions polyps or suspicious  discharge.     Cervix: no lesions, polyps discharge or CMT.  Uterus: firm,  anteverted, normal size and nontender.  Adnexa: no masses or tenderness.  Anus- normal, no lesions.  Rectovaginal - deferred.    ASSESSMENT/PLAN  53 year old here for annual exam and discussion of bone health.     Reviewed HRT as option for management of bone health and perimenopause. Recommend continuing with vitamin D/calcium supplementation as well. Already doing other lifestyle management. Risk factors for osteoporosis- family history and Chron's disease (no chronic steroids) Will need repeat dexa in 2 years.     Reviewed risk and benefits of HRT including but not limited to, benefit of symptom management and bone health with risk fo VTE and small increased risk of breast cancer. Reviewed available routes of medication, will use vivelle dot and prometrium.   All questions answered.     HCM- pap smear with HPV today. Discussed that I would recommend routine screening no more than every 3 years due to immunosuppression.   Mammogram due 10/2024  Lipid panel and HgbA1C ordered- will have drawn with next labs.     RTC in 1 year or as needed.     Seema Gutierrez MD

## 2024-09-04 LAB
BKR AP ASSOCIATED HPV REPORT: NORMAL
BKR LAB AP GYN ADEQUACY: NORMAL
BKR LAB AP GYN INTERPRETATION: NORMAL
BKR LAB AP PREVIOUS ABNORMAL: NORMAL
PATH REPORT.COMMENTS IMP SPEC: NORMAL
PATH REPORT.COMMENTS IMP SPEC: NORMAL
PATH REPORT.RELEVANT HX SPEC: NORMAL

## 2024-09-05 PROBLEM — Z98.890 H/O LEEP: Status: ACTIVE | Noted: 2024-09-05

## 2024-09-13 ENCOUNTER — OFFICE VISIT (OUTPATIENT)
Dept: DERMATOLOGY | Facility: CLINIC | Age: 54
End: 2024-09-13
Payer: COMMERCIAL

## 2024-09-13 DIAGNOSIS — Z12.83 ENCOUNTER FOR SCREENING FOR MALIGNANT NEOPLASM OF SKIN: ICD-10-CM

## 2024-09-13 DIAGNOSIS — L81.4 LENTIGINES: ICD-10-CM

## 2024-09-13 DIAGNOSIS — D22.9 MULTIPLE NEVI: Primary | ICD-10-CM

## 2024-09-13 PROCEDURE — 99213 OFFICE O/P EST LOW 20 MIN: CPT | Performed by: PHYSICIAN ASSISTANT

## 2024-09-13 ASSESSMENT — PAIN SCALES - GENERAL: PAINLEVEL: NO PAIN (0)

## 2024-09-13 NOTE — LETTER
9/13/2024       RE: Ceci Lopez  [address] [facility] [address]     Dear Colleague,    Thank you for referring your patient, Ceci Lopez, to the [facility] [place] at [facility]. Please see a copy of my visit note below.    [facility] Dermatology Note  Encounter Date: Sep 13, 2024  Office Visit     Reviewed patients past medical history and pertinent chart review prior to patients visit today.     Dermatology Problem List:  #. PMH Crohn's on Stelara   #. Nevi to monitor, abdomen  - photo 9/13/2024     No personal history of skin cancer.  No family history of skin cancer.  ____________________________________________    Assessment & Plan:     # Lesions to monitor, abdomen  - Reassuring on dermoscopy. Patient reports the nevi have been present for many years without changes. Photos with 6 month follow up.     # Verruca plantaris  - Reviewed treatment options, patient declined for now.     # Multiple nevi, trunk and extremities  # Solar lentigines  - No concerning features on dermoscopy. We discussed the importance of self exams at home. ABCDE criteria and importance of photoprotection reviewed.     # Seborrheic keratoses  - We discussed the benign nature of the skin lesions. No treatment required. Continued observation recommended. Follow up with any concerns.      Follow-up:  Annual for follow up full body skin exam, as needed for new or changing lesions or new concerns    All risks, benefits and alternatives were discussed with patient.  Patient is in agreement and understands the assessment and plan.  All questions were answered.  Saray Henry PA-C  [facility]ermat[facility]    ____________________________________________    CC: Derm Problem (FBSE- no spots of concern)    HPI:  Ms. Ceci Lopez is a(n) 53 year old female who presents today as a return patient for a full body skin cancer screening.  No specific cutaneous concerns today.  Upon questioning, the patient reports 2 nevi on her abdomen have been present for many years without changes.  No symptoms from the sites.  The patient does not typically utilize photoprotection.      Physical Exam:  Vitals: LMP 06/20/2024 (Approximate)   SKIN: Total skin excluding the genitalia areas was performed. The exam included the scalp, face, neck, bilateral arms, chest, back, abdomen, bilateral legs, digits, mons pubis, buttocks, and nails.   - De Santiago II and tanned.  - Verrucal papule(s) with thrombosed capillaries involving the left plantar foot.   - Multiple tan/brown macules and papules scattered throughout exam, consistent with benign nevi. No concerning features on dermoscopy.   - Scattered tan, homogenous macules scattered on sun exposed skin, consistent with solar lentigines.   - Scattered waxy, stuck on appearing papules and patches, consistent with seborrheic keratoses.    - Several 1-2 mm red dome shaped symmetric papules, consistent with cherry angiomas.     Medications:  Current Outpatient Medications   Medication Sig Dispense Refill     acyclovir (ZOVIRAX) 400 MG tablet Take 1 tablet (400 mg) by mouth every 8 hours (Patient taking differently: Take 400 mg by mouth every 8 hours. Uses for 5 days at a time as needed) 15 tablet 2     CALCIUM CITRATE PO Take 500 mg by mouth 2 times daily. 2 tablets twice daily most days (a total of 1000 mg).       estradiol (VIVELLE-DOT) 0.0375 MG/24HR BIW patch Place 1 patch over 96 hours onto the skin twice a week. 24 patch 3     FERROUS BISGLYCINATE CHELATE PO Take 1 tablet by mouth Mega[employer] Blood Builder - Vitamin C, B12, Folic acid, Ferrous bisglycinate       progesterone (PROMETRIUM) 100 MG capsule Take 1 capsule (100 mg) by mouth daily. 90 capsule 3     STELARA 90 MG/ML INJECT THE CONTENTS OF 1 SYRINGE UNDER THE SKIN EVERY 8 WEEKS 1 mL 2     Vitamin D3 (CHOLECALCIFEROL) 25 mcg (1000 units) tablet Take 50 mcg by  mouth daily       Current Facility-Administered Medications   Medication Dose Route Frequency Provider Last Rate Last Admin     lidocaine (PF) (XYLOCAINE) 1 % injection 7 mL  7 mL   Waldo Mcfadden MD   7 mL at 09/13/23 1635     triamcinolone (KENALOG-40) injection 40 mg  40 mg   Waldo Mcfadden MD   40 mg at 09/13/23 1635      Past Medical History:   Patient Active Problem List   Diagnosis     Crohn's disease (H)     Pelvic floor dysfunction     Female pelvic pain     Hyperlipidemia     SBO (small bowel obstruction) (H)     Colitis     Ulcerative colitis (H)     H/O LEEP     History reviewed. No pertinent past medical history.    CC No referring provider defined for this encounter. on close of this encounter.      Again, thank you for allowing me to participate in the care of your patient.      Sincerely,    Saray Henry PA-C

## 2024-09-13 NOTE — NURSING NOTE
Dermatology Rooming Note    Ceci Lopez's goals for this visit include:   Chief Complaint   Patient presents with    Derm Problem     FBSE- no spots of concern     SAL Oh

## 2024-09-13 NOTE — PATIENT INSTRUCTIONS
Patient Education        Proper skin care from [facility]:     -Eliminate harsh soaps as they strip the natural oils from the skin, often resulting in dry itchy skin ( i.e. Dial, Zest, Yor[place] Spring)  -Use mild soaps such as Cetaphil or Dove Sensitive Skin in the shower. You do not need to use soap on arms, legs, and trunk every time you shower unless visibly soiled.   -Avoid hot or cold showers.  -After showering, lightly dry off and apply moisturizing within 2-3 minutes. This will help trap moisture in the skin.   -Aggressive use of a moisturizer at least 1-2 times a day to the entire body (including -Vanicream, Cetaphil, Aquaphor or Cerave) and moisturize hands after every washing.  -We recommend using moisturizers that come in a tub that needs to be scooped out, not a pump. This has more of an oil base. It will hold moisture in your skin much better than a water base moisturizer. The above recommended are non-pore clogging.        Wear a sunscreen with at least SPF 30 on your face, ears, neck and V of the chest daily. Wear sunscreen on other areas of the body if those areas are exposed to the sun throughout the day. Sunscreens can contain physical and/or chemical blockers. Physical blockers are less likely to clog pores, these include zinc oxide and titanium dioxide. Reapply every two hour and after swimming.      Sunscreen examples: https://www.ewg.org/sunscreen/     UV radiation  UVA radiation remains constant throughout the day and throughout the year. It is a longer wavelength than UVB and therefore penetrates deeper into the skin leading to immediate and delayed tanning, photoaging, and skin cancer. 70-80% of UVA and UVB radiation occurs between the hours of 10am-2pm.  UVB radiation  UVB radiation causes the most harmful effects and is more significant during the summer months. However, snow and ice can reflect UVB radiation leading to skin damage during the winter months as well. UVB radiation is  responsible for tanning, burning, inflammation, delayed erythema (pinkness), pigmentation (brown spots), and skin cancer.      I recommend self monthly full body exams and yearly full body exams with a dermatology provider. If you develop a new or changing lesion please follow up for examination. Most skin cancers are pink and scaly or pink and pearly. However, we do see blue/brown/black skin cancers.  Consider the ABCDEs of melanoma when giving yourself your monthly full body exam ( don't forget the groin, buttocks, feet, toes, etc). A-asymmetry, B-borders, C-color, D-diameter, E-elevation or evolving. If you see any of these changes please follow up in clinic. If you cannot see your back I recommend purchasing a hand held mirror to use with a larger wall mirror.       Checking for Skin Cancer  You can find cancer early by checking your skin each month. There are 3 kinds of skin cancer. They are melanoma, basal cell carcinoma, and squamous cell carcinoma. Doing monthly skin checks is the best way to find new marks or skin changes. Follow the instructions below for checking your skin.   The ABCDEs of checking moles for melanoma   Check your moles or growths for signs of melanoma using ABCDE:   Asymmetry: the sides of the mole or growth don t match  Border: the edges are ragged, notched, or blurred  Color: the color within the mole or growth varies  Diameter: the mole or growth is larger than 6 mm (size of a pencil eraser)  Evolving: the size, shape, or color of the mole or growth is changing (evolving is not shown in the images below)    Checking for other types of skin cancer  Basal cell carcinoma or squamous cell carcinoma have symptoms such as:      A spot or mole that looks different from all other marks on your skin  Changes in how an area feels, such as itching, tenderness, or pain  Changes in the skin's surface, such as oozing, bleeding, or scaliness  A sore that does not heal  New swelling or redness beyond  the border of a mole     Who s at risk?  Anyone can get skin cancer. But you are at greater risk if you have:   Fair skin, light-colored hair, or light-colored eyes  Many moles or abnormal moles on your skin  A history of sunburns from sunlight or tanning beds  A family history of skin cancer  A history of exposure to radiation or chemicals  A weakened immune system  If you have had skin cancer in the past, you are at risk for recurring skin cancer.   How to check your skin  Do your monthly skin checkups in front of a full-length mirror. Check all parts of your body, including your:   Head (ears, face, neck, and scalp)  Torso (front, back, and sides)  Arms (tops, undersides, upper, and lower armpits)  Hands (palms, backs, and fingers, including under the nails)  Buttocks and genitals  Legs (front, back, and sides)  Feet (tops, soles, toes, including under the nails, and between toes)  If you have a lot of moles, take digital photos of them each month. Make sure to take photos both up close and from a distance. These can help you see if any moles change over time.   Most skin changes are not cancer. But if you see any changes in your skin, call your doctor right away. Only he or she can diagnose a problem. If you have skin cancer, seeing your doctor can be the first step toward getting the treatment that could save your life.   [employer] last reviewed this educational content on 4/1/2019 2000-2020 [employer]. [address] [place] [address], [place] [address]. All rights reserved. This information is not intended as a substitute for professional medical care. Always follow your healthcare professional's instructions.        When should I call my doctor?  If you are worsening or not improving, please, contact us or seek urgent care as noted below.      Who should I call with questions (adults)?  [facility] (adult and pediatric): [phone]  [facility]  [place] (adult): [phone]  [facility] ([place], [place], [place] and [place]) [phone]  For urgent needs outside of business hours call the [facility] at [phone] and ask for the dermatology resident on call to be paged  If this is a medical emergency and you are unable to reach an ER, Call 911        If you need a prescription refill, please contact your pharmacy. Refills are approved or denied by our Physicians during normal business hours, Monday through Fridays  Per office policy, refills will not be granted if you have not been seen within the past year (or sooner depending on your child's condition)

## 2024-09-13 NOTE — PROGRESS NOTES
[facility] Note  Encounter Date: Sep 13, 2024  Office Visit     Reviewed patients past medical history and pertinent chart review prior to patients visit today.     Dermatology Problem List:  #. PMH Crohn's on Stelara   #. Nevi to monitor, abdomen  - photo 9/13/2024     No personal history of skin cancer.  No family history of skin cancer.  ____________________________________________    Assessment & Plan:     # Lesions to monitor, abdomen  - Reassuring on dermoscopy. Patient reports the nevi have been present for many years without changes. Photos with 6 month follow up.     # Verruca plantaris  - Reviewed treatment options, patient declined for now.     # Multiple nevi, trunk and extremities  # Solar lentigines  - No concerning features on dermoscopy. We discussed the importance of self exams at home. ABCDE criteria and importance of photoprotection reviewed.     # Seborrheic keratoses  - We discussed the benign nature of the skin lesions. No treatment required. Continued observation recommended. Follow up with any concerns.      Follow-up:  Annual for follow up full body skin exam, as needed for new or changing lesions or new concerns    All risks, benefits and alternatives were discussed with patient.  Patient is in agreement and understands the assessment and plan.  All questions were answered.  Saray Henry PA-C  [facility]at[facility]    ____________________________________________    CC: Derm Problem (FBSE- no spots of concern)    HPI:  Ms. Ceci Lopez is a(n) 53 year old female who presents today as a return patient for a full body skin cancer screening. No specific cutaneous concerns today.  Upon questioning, the patient reports 2 nevi on her abdomen have been present for many years without changes.  No symptoms from the sites.  The patient does not typically utilize photoprotection.      Physical Exam:  Vitals: LMP 06/20/2024 (Approximate)   SKIN: Total skin  excluding the genitalia areas was performed. The exam included the scalp, face, neck, bilateral arms, chest, back, abdomen, bilateral legs, digits, mons pubis, buttocks, and nails.   - De Santiago II and tanned.  - Verrucal papule(s) with thrombosed capillaries involving the left plantar foot.   - Multiple tan/brown macules and papules scattered throughout exam, consistent with benign nevi. No concerning features on dermoscopy.   - Scattered tan, homogenous macules scattered on sun exposed skin, consistent with solar lentigines.   - Scattered waxy, stuck on appearing papules and patches, consistent with seborrheic keratoses.    - Several 1-2 mm red dome shaped symmetric papules, consistent with cherry angiomas.     Medications:  Current Outpatient Medications   Medication Sig Dispense Refill    acyclovir (ZOVIRAX) 400 MG tablet Take 1 tablet (400 mg) by mouth every 8 hours (Patient taking differently: Take 400 mg by mouth every 8 hours. Uses for 5 days at a time as needed) 15 tablet 2    CALCIUM CITRATE PO Take 500 mg by mouth 2 times daily. 2 tablets twice daily most days (a total of 1000 mg).      estradiol (VIVELLE-DOT) 0.0375 MG/24HR BIW patch Place 1 patch over 96 hours onto the skin twice a week. 24 patch 3    FERROUS BISGLYCINATE CHELATE PO Take 1 tablet by mouth Mega[employer] Blood Builder - Vitamin C, B12, Folic acid, Ferrous bisglycinate      progesterone (PROMETRIUM) 100 MG capsule Take 1 capsule (100 mg) by mouth daily. 90 capsule 3    STELARA 90 MG/ML INJECT THE CONTENTS OF 1 SYRINGE UNDER THE SKIN EVERY 8 WEEKS 1 mL 2    Vitamin D3 (CHOLECALCIFEROL) 25 mcg (1000 units) tablet Take 50 mcg by mouth daily       Current Facility-Administered Medications   Medication Dose Route Frequency Provider Last Rate Last Admin    lidocaine (PF) (XYLOCAINE) 1 % injection 7 mL  7 mL   Waldo Mcfadden MD   7 mL at 09/13/23 2905    triamcinolone (KENALOG-40) injection 40 mg  40 mg   Waldo Mcfadden MD   40 mg at  09/13/23 9325      Past Medical History:   Patient Active Problem List   Diagnosis    Crohn's disease (H)    Pelvic floor dysfunction    Female pelvic pain    Hyperlipidemia    SBO (small bowel obstruction) (H)    Colitis    Ulcerative colitis (H)    H/O LEEP     History reviewed. No pertinent past medical history.    CC No referring provider defined for this encounter. on close of this encounter.

## 2024-10-18 DIAGNOSIS — K50.80 CROHN'S DISEASE OF BOTH SMALL AND LARGE INTESTINE WITHOUT COMPLICATION (H): ICD-10-CM

## 2024-10-23 ENCOUNTER — VIRTUAL VISIT (OUTPATIENT)
Dept: GASTROENTEROLOGY | Facility: CLINIC | Age: 54
End: 2024-10-23
Attending: INTERNAL MEDICINE
Payer: COMMERCIAL

## 2024-10-23 DIAGNOSIS — K50.80 CROHN'S DISEASE OF BOTH SMALL AND LARGE INTESTINE WITHOUT COMPLICATION (H): Primary | ICD-10-CM

## 2024-10-23 PROCEDURE — 99214 OFFICE O/P EST MOD 30 MIN: CPT | Mod: 95 | Performed by: PHYSICIAN ASSISTANT

## 2024-10-23 NOTE — PROGRESS NOTES
Virtual Visit Details    Type of service:  Video Visit     Originating Location (pt. Location): Home    Distant Location (provider location):  Off-site  Platform used for Video Visit: Kendra    Start 238  Stop 251    IBD CLINIC VISIT    CC/REFERRING MD:  Ruperto Cain    REASON FOR CONSULTATION: follow up CD    ASSESSMENT/PLAN:    Crohn's ileo-colitis - has clinical and endoscopic remission (endoscopy 2023) on ustekinumab  -- Continue ustekinumab  -- Continue maintenance labs q 3 months   -- Colonoscopy due 2025, order placed    2. Osteopenia at multiple sites - Started hormone replacement therapy this summer after DEXA scan this summer.  On calcium/vitamin D.   -follow with endocrinology    IBD HISTORY  Age at diagnosis: 2005  Extent of disease: initially left sided - however most recent colonoscopy shows ileitis and ascending colitis. Biopsies with inflammation throughout colon except descending/sigmoid  Disease phenotype: inflammatory  Ruthy-anal disease: none  Current CD medications:   -Ustekinumab  Prior IBD surgeries: none  Prior IBD Medications:   - Apriso  - other mesalamine  - prednisone 2018    DRUG MONITORING  TPMT enzyme activity: 28 WNL     6-TGN/6-MMPN levels: --     Biologic concentration: --    DISEASE ASSESSMENT  Labs  Recent Labs   Lab Test 08/13/24  1437 05/06/24  1545 12/06/22  1432 05/04/22  1419 08/25/21  1111 08/25/21  1110   CRP  --   --   --  <0.1  --  <2.9   SED 17 17   < > 13  --   --    HGB 12.0 12.7   < > 12.6   < >  --     < > = values in this interval not displayed.     Fecal calprotectin: None  Endoscopy:   August 21, 2020 EGD and Colonoscopy  EGD was normal. Gastric and duodenal biopsies were negative.     Colonoscopy showed 4 aphthous ulcers in the terminal ileum. Some minimal erythema in the distal rectum. Biopsies of the ileum revealed some nonspecific minimally active chornic ileitis possibly due to backwash ileitis per pathologist. The rectal biopsy showed some  minimally active UC, but the remainder of the colon biopsies were negative.      Colonscopy 10/2021 - ulceration in ileum. Biopsies chronic active ileitis. Mild inflammation in ascending colon. More c/w Crohn's.  Enterography: MRE 11/10/21  1. Minimal wall thickening and minimal patchy increased enhancement in  the terminal ileum suggests very mild ileitis. Remainder of the small  bowel is normal.  2. Myomatous uterus.  C diff: None     Colonoscopy 1/2023 - normal TI and colon. SESCD score 0    sIBDQ:   IBDQ Score Date IBDQ - Total Score  (Higher score better)   4/11/2024  10:34 AM 67   10/24/2023  11:02 AM 67   4/25/2022   9:02 AM 63   11/17/2021   3:01 PM 63       IBD Health Care Maintenance:    Vaccinations:  All patients on biologics should avoid live vaccines.    -- Influenza (every year)  -- TdaP (every 10 years)  -- Pneumococcal Pneumonia (once plus booster at 5 years)  -- Yearly assessment for latent Tb (verbal screening and exam, PPD or QuantiFERON-Tb testing)     One time confirmation of immunity or serologies:  -- Hepatitis A (serologies or immunizations)  -- Hepatitis B (serologies or immunizations)  -- Varicella  -- MMR  -- HPV (all aged 18-26)  -- Meningococcal meningitis (all patients at risk for meningitis)  -- Due to the immunosuppression in this patient, I would not advise administration of live vaccines such as varicella/VZV, intranasal influenza, MMR, or yellow fever vaccine (if travelling).       Bone mineral density screening   -- Recommend all patients supplement with calcium and vitamin D  -- Given prior steroid use recommend DEXA if not already done - low bone density - on calcium/vit D - saw endocrine. Repeat DEXA for summer 81561     Cancer Screening:  Colon cancer screening:  Given colonic involvement, recommend patient undergo regular dysplasia surveillance   Next dysplasia screening is recommended 2025.     Cervical cancer screening: Annual due to immunosupression     Skin cancer  screening: Annual visual exam of skin by dermatologist since patient is immunocompromised     Depression Screening:  -- Over the last month, have you felt down, depressed, or hopeless? no  -- Over the last month, have you felt little interest or pleasure doing things? no     Misc:  -- Avoid tobacco use  -- Avoid NSAIDs as there is potentially a 25% chance of causing an IBD flare    Return to clinic in 6 months with IBD TENN[place]LE ([place]) and 12 months with Dr. Cain    Thank you for this consultation.  It was a pleasure to participate in the care of this patient; please contact us with any further questions.      Eduar Haynes PA-C  Division of Gastroenterology, Hepatology and Nutrition  [facility]     HPI:   Currently, here for routine follow up.     BM once a day, formed, no blood. No GI symptoms at all.    No EIM. Saw derm recently.    Saw endocrine for low bone density and started HRT.    ROS:    No fevers or chills  No weight loss  No blurry vision, double vision or change in vision  No sore throat  No lymphadenopathy  No headache, paraesthesias, or weakness in a limb  No shortness of breath or wheezing  No chest pain or pressure  No arthralgias or myalgias  No rashes or skin changes  No odynophagia or dysphagia  No BRBPR, hematochezia, melena  No dysuria, frequency or urgency  No hot/cold intolerance or polyria  No anxiety or depression    Extra intestinal manifestations of IBD:  No uveitis/episcleritis  No aphthous ulcers   No arthritis   No erythema nodosum/pyoderma gangrenosum.     PERTINENT PAST MEDICAL HISTORY:  No past medical history on file.    PREVIOUS SURGERIES:  Past Surgical History:   Procedure Laterality Date    COLONOSCOPY N/A 10/18/2021    Procedure: COLONOSCOPY, WITH BIOPSY;  Surgeon: Ruperto Cain MD;  [place] [facility] [place]    COLONOSCOPY N/A 1/9/2023    Procedure: COLONOSCOPY;  Surgeon: Ruperto Cain MD;  [place] [facility] [place]       PREVIOUS ENDOSCOPY:  Results for  orders placed or performed during the hospital encounter of 01/09/23   COLONOSCOPY    Collection Time: 01/09/23  2:19 PM   Result Value Ref Range    COLONOSCOPY       [facility]  [address] [place], [place] [address]., [place] [address] [phone]     Endoscopy Department  _______________________________________________________________________________  Patient Name: Ceci Lopez            Procedure Date: 1/9/2023 2:19 PM  MRN: [id number]                       Account Number: [id number]  YOB: 1970             Admit Type: Outpatient  Age: 52                               Room: [facility] PROCEDURE ROOM 03  Gender: Female                        Note Status: Finalized  Attending MD: KE FRENCH MD  Total Sedation Time:   _______________________________________________________________________________     Procedure:             Colonoscopy  Indications:           Assess therapeutic response to therapy of Crohn's                          disease of the small bowel and colon  Providers:             KE FRENCH MD, Mason Franz RN,                          MASON HER  Referring MD:          CITLALI Finney  Requesting Provider:   Matt Callaway MD  Medicines:             Monitored Anesthesia Care  Complications:         No immediate complications.  _______________________________________________________________________________  Procedure:             Pre-Anesthesia Assessment:                         - See EPIC H and P note                         After obtaining informed consent, the colonoscope was                          passed under direct vision. Throughout the procedure,                          the patient's blood pressure, pulse, and oxygen                          saturations were monitored continuously. The                          Colonoscope was introduced through the anus and                          advanced to the terminal ileum, with identification  of                          the appendiceal orifice and IC valve. The colonoscopy                          was performed without difficulty. The patient                          tolerated the procedure well. The quality  of the bowel                          preparation was fair.                                                                                   Findings:       The perianal and digital rectal examinations were normal.       The terminal ileum appeared normal.       The entire examined colon appeared normal on direct and retroflexion        views.       The Simple Endoscopic Score for Crohn's Disease was determined based on        the endoscopic appearance of the mucosa in the following segments:       - Ileum: Findings include no ulcers present, no ulcerated surfaces, no        affected surfaces and no narrowings. Segment score: 0.       - Right Colon: Findings include no ulcers present, no ulcerated        surfaces, no affected surfaces and no narrowings. Segment score: 0.       - Transverse Colon: Findings include no ulcers present, no ulcerated        surfaces, no affected surfaces and no narrowings. Segment score: 0.       - Left Colon: Findings include no ulcers present, no ulcerate d surfaces,        no affected surfaces and no narrowings. Segment score: 0.       - Rectum: Findings include no ulcers present, no ulcerated surfaces, no        affected surfaces and no narrowings. Segment score: 0.       - Total SES-CD aggregate score: 0.                                                                                   Impression:            - Preparation of the colon was fair.                         - The examined portion of the ileum was normal.                         - The entire examined colon is normal on direct and                          retroflexion views.                         - Simple Endoscopic Score for Crohn's Disease: 0,                          mucosal inflammatory changes  secondary to Crohn's                          disease, in remission.                         - No specimens collected.  Recommendation:        - Discharge patient to home (with escort).                         - Resume previous diet.                         - Continue present me dications.                         - Repeat colonoscopy in 2 years for surveillance                          (surveillance biopsies were obtained in 10/2021 and                          are not required now). Repeat surveillance biopsies                          with next colonoscopy (2023).                         - ALL FUTURE COLONOSCOPIES SHOULD HAVE DOUBLE                          (EXTENDED) PREP                                                                                     Electronically Signed by: Dr. Ruperto French  ___________________________  RUPERTO FRENCH MD  1/9/2023 3:08:01 PM  I was physically present for the entire viewing portion of the exam.  __________________________  Signature of teaching physician  RUPERTO FRENCH MD  Number of Addenda: 0    Note Initiated On: 1/9/2023 2:19 PM  Scope In:  Scope Out:     ]    ALLERGIES:     Allergies   Allergen Reactions    Ibuprofen Other (See Comments)     Causes colitis flare ups.  Patient does not want to take this medication.    Erythromycin      PN: LW Reaction: Vomiting       PERTINENT MEDICATIONS:    Current Outpatient Medications:     acyclovir (ZOVIRAX) 400 MG tablet, Take 1 tablet (400 mg) by mouth every 8 hours (Patient taking differently: Take 400 mg by mouth every 8 hours. Uses for 5 days at a time as needed), Disp: 15 tablet, Rfl: 2    CALCIUM CITRATE PO, Take 500 mg by mouth 2 times daily. 2 tablets twice daily most days (a total of 1000 mg)., Disp: , Rfl:     estradiol (VIVELLE-DOT) 0.0375 MG/24HR BIW patch, Place 1 patch over 96 hours onto the skin twice a week., Disp: 24 patch, Rfl: 3    FERROUS BISGLYCINATE CHELATE PO, Take 1 tablet by mouth [employer]  Blood Builder - Vitamin C, B12, Folic acid, Ferrous bisglycinate, Disp: , Rfl:     progesterone (PROMETRIUM) 100 MG capsule, Take 1 capsule (100 mg) by mouth daily., Disp: 90 capsule, Rfl: 3    STELARA 90 MG/ML, INJECT THE CONTENTS OF 1 SYRINGE UNDER THE SKIN EVERY 8 WEEKS, Disp: 1 mL, Rfl: 2    Vitamin D3 (CHOLECALCIFEROL) 25 mcg (1000 units) tablet, Take 50 mcg by mouth daily, Disp: , Rfl:     Current Facility-Administered Medications:     lidocaine (PF) (XYLOCAINE) 1 % injection 7 mL, 7 mL, , , Waldo Mcfadden MD, 7 mL at 09/13/23 1635    triamcinolone (KENALOG-40) injection 40 mg, 40 mg, , , Waldo Mcfadden MD, 40 mg at 09/13/23 1635    SOCIAL HISTORY:  Social History     Socioeconomic History    Marital status:      Spouse name: Not on file    Number of children: Not on file    Years of education: Not on file    Highest education level: Not on file   Occupational History    Not on file   Tobacco Use    Smoking status: Never     Passive exposure: Never    Smokeless tobacco: Never   Vaping Use    Vaping status: Never Used   Substance and Sexual Activity    Alcohol use: Never    Drug use: Never    Sexual activity: Yes     Partners: Male     Birth control/protection: None   Other Topics Concern    Not on file   Social History Narrative    Not on file     Social Drivers of Health     Financial Resource Strain: Low Risk  (2/14/2024)    Financial Resource Strain     Within the past 12 months, have you or your family members you live with been unable to get utilities (heat, electricity) when it was really needed?: No   Food Insecurity: Low Risk  (2/14/2024)    Food Insecurity     Within the past 12 months, did you worry that your food would run out before you got money to buy more?: No     Within the past 12 months, did the food you bought just not last and you didn t have money to get more?: No   Transportation Needs: Low Risk  (2/14/2024)    Transportation Needs     Within the past 12 months, has lack of  transportation kept you from medical appointments, getting your medicines, non-medical meetings or appointments, work, or from getting things that you need?: No   Physical Activity: Sufficiently Active (2/14/2024)    Exercise Vital Sign     Days of Exercise per Week: 5 days     Minutes of Exercise per Session: 50 min   Stress: No Stress Concern Present (2/14/2024)    [employer] - Occupational Stress Questionnaire     Feeling of Stress : Only a little   Social Connections: Unknown (2/14/2024)    Social Connection and Isolation Panel [NHANES]     Frequency of Communication with Friends and Family: Not on file     Frequency of Social Gatherings with Friends and Family: Once a week     Attends Quaker Services: Not on file     Active Member of Clubs or Organizations: Not on file     Attends Club or Organization Meetings: Not on file     Marital Status: Not on file   Interpersonal Safety: Low Risk  (8/5/2024)    Interpersonal Safety     Do you feel physically and emotionally safe where you currently live?: Yes     Within the past 12 months, have you been hit, slapped, kicked or otherwise physically hurt by someone?: No     Within the past 12 months, have you been humiliated or emotionally abused in other ways by your partner or ex-partner?: No   Housing Stability: Low Risk  (2/14/2024)    Housing Stability     Do you have housing? : Yes     Are you worried about losing your housing?: No       FAMILY HISTORY:  Family History   Problem Relation Age of Onset    Skin Cancer No family hx of     Melanoma No family hx of        Past/family/social history reviewed and no changes    PHYSICAL EXAMINATION:  Constitutional: aaox3, cooperative, pleasant, not dyspneic/diaphoretic, no acute distress  Vitals reviewed: There were no vitals taken for this visit.  Wt:   Wt Readings from Last 2 Encounters:   08/29/24 57.5 kg (126 lb 12.8 oz)   08/21/24 57.6 kg (127 lb)      Eyes: Sclera  anicteric/injected  Respiratory: Unlabored breathing  Skin: no jaundice  Psych: Normal affect      PERTINENT STUDIES:  Most recent CBC:  Recent Labs   Lab Test 08/13/24  1437 05/06/24  1545   WBC 6.9 6.7   HGB 12.0 12.7   HCT 36.6 38.7    252     Most recent hepatic panel:  Recent Labs   Lab Test 08/13/24  1437 05/06/24  1545   ALT 33 37   AST 27 31     Most recent creatinine:  Recent Labs   Lab Test 10/26/23  1337 05/04/22  1419   CR 0.72 0.80

## 2024-10-23 NOTE — LETTER
10/23/2024      Ceci Lopez  [address] [place] [id number]      Dear Colleague,    Thank you for referring your patient, Ceci Lopez, to the [facility] [place]. Please see a copy of my visit note below.    Virtual Visit Details    Type of service:  Video Visit     Originating Location (pt. Location): Home    Distant Location (provider location):  Off-site  Platform used for Video Visit: AmWell    Start 238  Stop 251    IBD CLINIC VISIT    CC/REFERRING MD:  Ruperto Cain    REASON FOR CONSULTATION: follow up CD    ASSESSMENT/PLAN:    Crohn's ileo-colitis - has clinical and endoscopic remission (endoscopy 2023) on ustekinumab  -- Continue ustekinumab  -- Continue maintenance labs q 3 months   -- Colonoscopy due 2025, order placed    2. Osteopenia at multiple sites - Started hormone replacement therapy this summer after DEXA scan this summer.  On calcium/vitamin D.   -follow with endocrinology    IBD HISTORY  Age at diagnosis: 2005  Extent of disease: initially left sided - however most recent colonoscopy shows ileitis and ascending colitis. Biopsies with inflammation throughout colon except descending/sigmoid  Disease phenotype: inflammatory  Ruthy-anal disease: none  Current CD medications:   -Ustekinumab  Prior IBD surgeries: none  Prior IBD Medications:   - Apriso  - other mesalamine  - prednisone 2018    DRUG MONITORING  TPMT enzyme activity: 28 WNL     6-TGN/6-MMPN levels: --     Biologic concentration: --    DISEASE ASSESSMENT  Labs  Recent Labs   Lab Test 08/13/24  1437 05/06/24  1545 12/06/22  1432 05/04/22  1419 08/25/21  1111 08/25/21  1110   CRP  --   --   --  <0.1  --  <2.9   SED 17 17   < > 13  --   --    HGB 12.0 12.7   < > 12.6   < >  --     < > = values in this interval not displayed.     Fecal calprotectin: None  Endoscopy:   August 21, 2020 EGD and Colonoscopy  EGD was normal. Gastric and duodenal biopsies were negative.     Colonoscopy  showed 4 aphthous ulcers in the terminal ileum. Some minimal erythema in the distal rectum. Biopsies of the ileum revealed some nonspecific minimally active chornic ileitis possibly due to backwash ileitis per pathologist. The rectal biopsy showed some minimally active UC, but the remainder of the colon biopsies were negative.      Colonscopy 10/2021 - ulceration in ileum. Biopsies chronic active ileitis. Mild inflammation in ascending colon. More c/w Crohn's.  Enterography: MRE 11/10/21  1. Minimal wall thickening and minimal patchy increased enhancement in  the terminal ileum suggests very mild ileitis. Remainder of the small  bowel is normal.  2. Myomatous uterus.  C diff: None     Colonoscopy 1/2023 - normal TI and colon. SESCD score 0    sIBDQ:   IBDQ Score Date IBDQ - Total Score  (Higher score better)   4/11/2024  10:34 AM 67   10/24/2023  11:02 AM 67   4/25/2022   9:02 AM 63   11/17/2021   3:01 PM 63       IBD Health Care Maintenance:    Vaccinations:  All patients on biologics should avoid live vaccines.    -- Influenza (every year)  -- TdaP (every 10 years)  -- Pneumococcal Pneumonia (once plus booster at 5 years)  -- Yearly assessment for latent Tb (verbal screening and exam, PPD or QuantiFERON-Tb testing)     One time confirmation of immunity or serologies:  -- Hepatitis A (serologies or immunizations)  -- Hepatitis B (serologies or immunizations)  -- Varicella  -- MMR  -- HPV (all aged 18-26)  -- Meningococcal meningitis (all patients at risk for meningitis)  -- Due to the immunosuppression in this patient, I would not advise administration of live vaccines such as varicella/VZV, intranasal influenza, MMR, or yellow fever vaccine (if travelling).       Bone mineral density screening   -- Recommend all patients supplement with calcium and vitamin D  -- Given prior steroid use recommend DEXA if not already done - low bone density - on calcium/vit D - saw endocrine. Repeat DEXA for summer 22463     Cancer  Screening:  Colon cancer screening:  Given colonic involvement, recommend patient undergo regular dysplasia surveillance   Next dysplasia screening is recommended 2025.     Cervical cancer screening: Annual due to immunosupression     Skin cancer screening: Annual visual exam of skin by dermatologist since patient is immunocompromised     Depression Screening:  -- Over the last month, have you felt down, depressed, or hopeless? no  -- Over the last month, have you felt little interest or pleasure doing things? no     Misc:  -- Avoid tobacco use  -- Avoid NSAIDs as there is potentially a 25% chance of causing an IBD flare    Return to clinic in 6 months with IBD T[place] ([place]) and 12 months with Dr. Cain    Thank you for this consultation.  It was a pleasure to participate in the care of this patient; please contact us with any further questions.      Eduar Haynes PA-C  Division of Gastroenterology, Hepatology and Nutrition  [facility]     HPI:   Currently, here for routine follow up.     BM once a day, formed, no blood. No GI symptoms at all.    No EIM. Saw derm recently.    Saw endocrine for low bone density and started HRT.    ROS:    No fevers or chills  No weight loss  No blurry vision, double vision or change in vision  No sore throat  No lymphadenopathy  No headache, paraesthesias, or weakness in a limb  No shortness of breath or wheezing  No chest pain or pressure  No arthralgias or myalgias  No rashes or skin changes  No odynophagia or dysphagia  No BRBPR, hematochezia, melena  No dysuria, frequency or urgency  No hot/cold intolerance or polyria  No anxiety or depression    Extra intestinal manifestations of IBD:  No uveitis/episcleritis  No aphthous ulcers   No arthritis   No erythema nodosum/pyoderma gangrenosum.     PERTINENT PAST MEDICAL HISTORY:  No past medical history on file.    PREVIOUS SURGERIES:  Past Surgical History:   Procedure Laterality Date     COLONOSCOPY N/A 10/18/2021     Procedure: COLONOSCOPY, WITH BIOPSY;  Surgeon: Ke Cain MD;  [place] [facility] [place]     COLONOSCOPY N/A 1/9/2023    Procedure: COLONOSCOPY;  Surgeon: Ke Cain MD;  [place] [facility] [place]       PREVIOUS ENDOSCOPY:  Results for orders placed or performed during the hospital encounter of 01/09/23   COLONOSCOPY    Collection Time: 01/09/23  2:19 PM   Result Value Ref Range    COLONOSCOPY       [facility]  [address] [place] [address], [place] [address] [phone]     Endoscopy Department  _______________________________________________________________________________  Patient Name: Ceci Lopez            Procedure Date: 1/9/2023 2:19 PM  MRN: [id number]                       Account Number: [id number]  YOB: 1970             Admit Type: Outpatient  Age: 52                               [place] [facility] [place] [facility]  Gender: Female                        Note Status: Finalized  Attending MD: KE CAIN MD  Total Sedation Time:   _______________________________________________________________________________     Procedure:             Colonoscopy  Indications:           Assess therapeutic response to therapy of Crohn's                          disease of the small bowel and colon  Providers:             KE CAIN MD, Mason Franz RN,                          MASON HER  Referring MD:          CITLALI Finney  Requesting Provider:   Matt Callaway MD  Medicines:             Monitored Anesthesia Care  Complications:         No immediate complications.  _______________________________________________________________________________  Procedure:             Pre-Anesthesia Assessment:                         - See EPIC H and P note                         After obtaining informed consent, the colonoscope was                          passed under direct vision. Throughout the procedure,                          the patient's blood  pressure, pulse, and oxygen                          saturations were monitored continuously. The                          Colonoscope was introduced through the anus and                          advanced to the terminal ileum, with identification of                          the appendiceal orifice and IC valve. The colonoscopy                          was performed without difficulty. The patient                          tolerated the procedure well. The quality  of the bowel                          preparation was fair.                                                                                   Findings:       The perianal and digital rectal examinations were normal.       The terminal ileum appeared normal.       The entire examined colon appeared normal on direct and retroflexion        views.       The Simple Endoscopic Score for Crohn's Disease was determined based on        the endoscopic appearance of the mucosa in the following segments:       - Ileum: Findings include no ulcers present, no ulcerated surfaces, no        affected surfaces and no narrowings. Segment score: 0.       - Right Colon: Findings include no ulcers present, no ulcerated        surfaces, no affected surfaces and no narrowings. Segment score: 0.       - Transverse Colon: Findings include no ulcers present, no ulcerated        surfaces, no affected surfaces and no narrowings. Segment score: 0.       - Left Colon: Findings include no ulcers present, no ulcerate d surfaces,        no affected surfaces and no narrowings. Segment score: 0.       - Rectum: Findings include no ulcers present, no ulcerated surfaces, no        affected surfaces and no narrowings. Segment score: 0.       - Total SES-CD aggregate score: 0.                                                                                   Impression:            - Preparation of the colon was fair.                         - The examined portion of the ileum was normal.                          - The entire examined colon is normal on direct and                          retroflexion views.                         - Simple Endoscopic Score for Crohn's Disease: 0,                          mucosal inflammatory changes secondary to Crohn's                          disease, in remission.                         - No specimens collected.  Recommendation:        - Discharge patient to home (with escort).                         - Resume previous diet.                         - Continue present me dications.                         - Repeat colonoscopy in 2 years for surveillance                          (surveillance biopsies were obtained in 10/2021 and                          are not required now). Repeat surveillance biopsies                          with next colonoscopy (2023).                         - ALL FUTURE COLONOSCOPIES SHOULD HAVE DOUBLE                          (EXTENDED) PREP                                                                                     Electronically Signed by: Dr. Ruperto French  ___________________________  RUPERTO FRENCH MD  1/9/2023 3:08:01 PM  I was physically present for the entire viewing portion of the exam.  __________________________  Signature of teaching physician  RUPERTO FRENCH MD  Number of Addenda: 0    Note Initiated On: 1/9/2023 2:19 PM  Scope In:  Scope Out:     ]    ALLERGIES:     Allergies   Allergen Reactions     Ibuprofen Other (See Comments)     Causes colitis flare ups.  Patient does not want to take this medication.     Erythromycin      PN: LW Reaction: Vomiting       PERTINENT MEDICATIONS:    Current Outpatient Medications:      acyclovir (ZOVIRAX) 400 MG tablet, Take 1 tablet (400 mg) by mouth every 8 hours (Patient taking differently: Take 400 mg by mouth every 8 hours. Uses for 5 days at a time as needed), Disp: 15 tablet, Rfl: 2     CALCIUM CITRATE PO, Take 500 mg by mouth 2 times daily. 2 tablets twice daily most  days (a total of 1000 mg)., Disp: , Rfl:      estradiol (VIVELLE-DOT) 0.0375 MG/24HR BIW patch, Place 1 patch over 96 hours onto the skin twice a week., Disp: 24 patch, Rfl: 3     FERROUS BISGLYCINATE CHELATE PO, Take 1 tablet by mouth [employer] Blood [employer] - Vitamin C, B12, Folic acid, Ferrous bisglycinate, Disp: , Rfl:      progesterone (PROMETRIUM) 100 MG capsule, Take 1 capsule (100 mg) by mouth daily., Disp: 90 capsule, Rfl: 3     STELARA 90 MG/ML, INJECT THE CONTENTS OF 1 SYRINGE UNDER THE SKIN EVERY 8 WEEKS, Disp: 1 mL, Rfl: 2     Vitamin D3 (CHOLECALCIFEROL) 25 mcg (1000 units) tablet, Take 50 mcg by mouth daily, Disp: , Rfl:     Current Facility-Administered Medications:      lidocaine (PF) (XYLOCAINE) 1 % injection 7 mL, 7 mL, , , Waldo Mcfadden MD, 7 mL at 09/13/23 1635     triamcinolone (KENALOG-40) injection 40 mg, 40 mg, , , Waldo Mcfadden MD, 40 mg at 09/13/23 1635    SOCIAL HISTORY:  Social History     Socioeconomic History     Marital status:      Spouse name: Not on file     Number of children: Not on file     Years of education: Not on file     Highest education level: Not on file   Occupational History     Not on file   Tobacco Use     Smoking status: Never     Passive exposure: Never     Smokeless tobacco: Never   Vaping Use     Vaping status: Never Used   Substance and Sexual Activity     Alcohol use: Never     Drug use: Never     Sexual activity: Yes     Partners: Male     Birth control/protection: None   Other Topics Concern     Not on file   Social History Narrative     Not on file     Social Drivers of Health     Financial Resource Strain: Low Risk  (2/14/2024)    Financial Resource Strain      Within the past 12 months, have you or your family members you live with been unable to get utilities (heat, electricity) when it was really needed?: No   Food Insecurity: Low Risk  (2/14/2024)    Food Insecurity      Within the past 12 months, did you worry that your food would run  out before you got money to buy more?: No      Within the past 12 months, did the food you bought just not last and you didn t have money to get more?: No   Transportation Needs: Low Risk  (2/14/2024)    Transportation Needs      Within the past 12 months, has lack of transportation kept you from medical appointments, getting your medicines, non-medical meetings or appointments, work, or from getting things that you need?: No   Physical Activity: Sufficiently Active (2/14/2024)    Exercise Vital Sign      Days of Exercise per Week: 5 days      Minutes of Exercise per Session: 50 min   Stress: No Stress Concern Present (2/14/2024)    [employer] - Occupational Stress Questionnaire      Feeling of Stress : Only a little   Social Connections: Unknown (2/14/2024)    Social Connection and Isolation Panel [NHANES]      Frequency of Communication with Friends and Family: Not on file      Frequency of Social Gatherings with Friends and Family: Once a week      Attends Restorationist Services: Not on file      Active Member of Clubs or Organizations: Not on file      Attends Club or Organization Meetings: Not on file      Marital Status: Not on file   Interpersonal Safety: Low Risk  (8/5/2024)    Interpersonal Safety      Do you feel physically and emotionally safe where you currently live?: Yes      Within the past 12 months, have you been hit, slapped, kicked or otherwise physically hurt by someone?: No      Within the past 12 months, have you been humiliated or emotionally abused in other ways by your partner or ex-partner?: No   Housing Stability: Low Risk  (2/14/2024)    Housing Stability      Do you have housing? : Yes      Are you worried about losing your housing?: No       FAMILY HISTORY:  Family History   Problem Relation Age of Onset     Skin Cancer No family hx of      Melanoma No family hx of        Past/family/social history reviewed and no changes    PHYSICAL  EXAMINATION:  Constitutional: aaox3, cooperative, pleasant, not dyspneic/diaphoretic, no acute distress  Vitals reviewed: There were no vitals taken for this visit.  Wt:   Wt Readings from Last 2 Encounters:   08/29/24 57.5 kg (126 lb 12.8 oz)   08/21/24 57.6 kg (127 lb)      Eyes: Sclera anicteric/injected  Respiratory: Unlabored breathing  Skin: no jaundice  Psych: Normal affect      PERTINENT STUDIES:  Most recent CBC:  Recent Labs   Lab Test 08/13/24  1437 05/06/24  1545   WBC 6.9 6.7   HGB 12.0 12.7   HCT 36.6 38.7    252     Most recent hepatic panel:  Recent Labs   Lab Test 08/13/24  1437 05/06/24  1545   ALT 33 37   AST 27 31     Most recent creatinine:  Recent Labs   Lab Test 10/26/23  1337 05/04/22  1419   CR 0.72 0.80               Again, thank you for allowing me to participate in the care of your patient.        Sincerely,        Eduar Haynes PA-C

## 2024-10-23 NOTE — PATIENT INSTRUCTIONS
It was a pleasure taking care of you today.  I've included a brief summary of our discussion and care plan from today's visit below.  Please review this information with your primary care provider.  ______________________________________________________________________    My recommendations are summarized as follows:    -- Continue stelara every 8 weeks  -- Labs every 3 months  -- Next endoscopic assessment: 2025  -- Patient with IBD we recommend supplementation vitamin D 1000 units daily and calcium 500 mg twice daily.  -- Vaccines/immunizations to be updated: all up to date  -- No NSAIDs (ibuprofen, or anything containing ibuprofen)    For additional resources about inflammatory bowel disease visit http://www.crohnscolitisfoundation.org/    To learn more about Diet and Nutrition in the setting of IBD, check out some of these resources:  https://www.crohnscolitisfoundation.org/diet-and-nutrition/what-should-i-eat  https://www.nimbal.org/  https://ntforibd.org/    Return to GI Clinic in 6 months to review your progress.    ______________________________________________________________________    How do I schedule labs, imaging studies, or procedures that were ordered in clinic today?     Labs: To schedule lab appointment at the [facility], use my chart or call [phone]. If you have a [facility] lab closer to home where you are regularly seen you can give them a call.     Procedures: If a colonoscopy, upper endoscopy, breath test, esophageal manometry, or pH impedence was ordered today, our endoscopy team will call you to schedule this. If you have not heard from our endoscopy team within a week, please call [phone] to schedule.     Imaging Studies: If you were scheduled for a CT scan, X-ray, MRI, ultrasound, HIDA scan or other imaging study, please call [phone] to have this scheduled.     Referral: If a referral to another specialty was ordered, expect a phone call or follow instructions  above. If you have not heard from anyone regarding your referral in a week, please call our clinic to check the status.     Who do I call with any questions after my visit?  Please be in touch if there are any further questions that arise following today's visit.  There are multiple ways to contact your gastroenterology care team.      During business hours, you may reach a Gastroenterology nurse at [phone]    To schedule or reschedule an appointment, please call [phone].     You can always send a secure message through Edsby.  Edsby messages are answered by your nurse or doctor typically within 24 hours.  Please allow extra time on weekends and holidays.      For urgent/emergent questions after business hours, you may reach the on-call GI Fellow by contacting the [facility]  at [phone].     How will I get the results of any tests ordered?    You will receive all of your results.  If you have signed up for Orgenesist, any tests ordered at your visit will be available to you after your physician reviews them.  Typically this takes 1-2 weeks.  If there are urgent results that require a change in your care plan, your physician or nurse will call you to discuss the next steps.      What is Edsby?  Edsby is a secure way for you to access all of your healthcare records from the [facility].  It is a web based computer program, so you can sign on to it from any location.  It also allows you to send secure messages to your care team.  I recommend signing up for Edsby access if you have not already done so and are comfortable with using a computer.         Sincerely,    Eduar Haynes PA-C  [facility]  Division of Gastroenterology

## 2024-10-23 NOTE — NURSING NOTE
Current patient location: Patient declined to provide     Is the patient currently in the state of [place]? YES    Visit mode:VIDEO    If the visit is dropped, the patient can be reconnected by: VIDEO VISIT: Send to e-mail at: [email or web address]    Will anyone else be joining the visit? NO  (If patient encounters technical issues they should call [phone])    Are changes needed to the allergy or medication list? No    Are refills needed on medications prescribed by this physician? NO    Rooming Documentation:  Not applicable    Reason for visit: RECHECK    Kira JONES

## 2024-10-24 RX ORDER — USTEKINUMAB 90 MG/ML
INJECTION, SOLUTION SUBCUTANEOUS
Qty: 1 ML | Refills: 2 | Status: SHIPPED | OUTPATIENT
Start: 2024-10-24

## 2024-10-24 NOTE — TELEPHONE ENCOUNTER
"Medication Requested:  STELARA 90 MG/ML 1 mL 2 5/7/2024 -- No   Sig: INJECT THE CONTENTS OF 1 SYRINGE UNDER THE SKIN EVERY 8 WEEKS     ----------------------  Last Office Visit : 10/23/2024  [facility] [place]      Future Office visit:  0  ----------------------  Per last visit note:  \"My recommendations are summarized as follows:     -- Continue stelara every 8 weeks  -- Labs every 3 months  Return to GI Clinic in 6 months to review your progress. \"      [x]Medication refilled per  Medication Refill in Ambulatory Care  policy.         [x] Supervision: no future appointment scheduled. Scheduling has been notified to contact the pt for appointment.    CBC RESULTS:   Recent Labs   Lab Test 08/13/24  1437   WBC 6.9   RBC 3.85   HGB 12.0   HCT 36.6   MCV 95   MCH 31.2   MCHC 32.8   RDW 11.8        CRP Inflammation   Date Value Ref Range Status   08/13/2024 <3.00 <5.00 mg/L Final     Erythrocyte Sedimentation Rate   Date Value Ref Range Status   08/13/2024 17 0 - 30 mm/hr Final   Liver Function Studies -   Recent Labs   Lab Test 08/13/24  1437   PROTTOTAL 7.2   ALBUMIN 4.4   BILITOTAL 0.2   ALKPHOS 59   AST 27   ALT 33       GI Protocol - Inflammatory Bowel Agents:  Ustekinumab (Stelara)  - 17yo+  - LOV 6mo  - Refill for 6mo    *CBC, CRP, ESR, LFT q6months        "

## 2024-10-29 ENCOUNTER — ANCILLARY PROCEDURE (OUTPATIENT)
Dept: MAMMOGRAPHY | Facility: CLINIC | Age: 54
End: 2024-10-29
Attending: INTERNAL MEDICINE
Payer: COMMERCIAL

## 2024-10-29 DIAGNOSIS — Z12.31 VISIT FOR SCREENING MAMMOGRAM: ICD-10-CM

## 2024-10-29 PROCEDURE — 77063 BREAST TOMOSYNTHESIS BI: CPT | Performed by: STUDENT IN AN ORGANIZED HEALTH CARE EDUCATION/TRAINING PROGRAM

## 2024-10-29 PROCEDURE — 77067 SCR MAMMO BI INCL CAD: CPT | Performed by: STUDENT IN AN ORGANIZED HEALTH CARE EDUCATION/TRAINING PROGRAM

## 2024-11-14 ENCOUNTER — LAB (OUTPATIENT)
Dept: LAB | Facility: CLINIC | Age: 54
End: 2024-11-14
Payer: COMMERCIAL

## 2024-11-14 DIAGNOSIS — Z13.220 SCREENING FOR LIPID DISORDERS: ICD-10-CM

## 2024-11-14 DIAGNOSIS — Z13.1 SCREENING FOR DIABETES MELLITUS: ICD-10-CM

## 2024-11-14 DIAGNOSIS — K50.80 CROHN'S DISEASE OF BOTH SMALL AND LARGE INTESTINE WITHOUT COMPLICATION (H): ICD-10-CM

## 2024-11-14 LAB
BASOPHILS # BLD AUTO: 0 10E3/UL (ref 0–0.2)
BASOPHILS NFR BLD AUTO: 0 %
EOSINOPHIL # BLD AUTO: 0 10E3/UL (ref 0–0.7)
EOSINOPHIL NFR BLD AUTO: 0 %
ERYTHROCYTE [DISTWIDTH] IN BLOOD BY AUTOMATED COUNT: 12 % (ref 10–15)
ERYTHROCYTE [SEDIMENTATION RATE] IN BLOOD BY WESTERGREN METHOD: 15 MM/HR (ref 0–30)
EST. AVERAGE GLUCOSE BLD GHB EST-MCNC: 97 MG/DL
HBA1C MFR BLD: 5 % (ref 0–5.6)
HCT VFR BLD AUTO: 38.2 % (ref 35–47)
HGB BLD-MCNC: 12.5 G/DL (ref 11.7–15.7)
IMM GRANULOCYTES # BLD: 0 10E3/UL
IMM GRANULOCYTES NFR BLD: 0 %
LYMPHOCYTES # BLD AUTO: 1.8 10E3/UL (ref 0.8–5.3)
LYMPHOCYTES NFR BLD AUTO: 34 %
MCH RBC QN AUTO: 31.2 PG (ref 26.5–33)
MCHC RBC AUTO-ENTMCNC: 32.7 G/DL (ref 31.5–36.5)
MCV RBC AUTO: 95 FL (ref 78–100)
MONOCYTES # BLD AUTO: 0.3 10E3/UL (ref 0–1.3)
MONOCYTES NFR BLD AUTO: 5 %
NEUTROPHILS # BLD AUTO: 3.2 10E3/UL (ref 1.6–8.3)
NEUTROPHILS NFR BLD AUTO: 60 %
PLATELET # BLD AUTO: 234 10E3/UL (ref 150–450)
RBC # BLD AUTO: 4.01 10E6/UL (ref 3.8–5.2)
WBC # BLD AUTO: 5.3 10E3/UL (ref 4–11)

## 2024-11-14 PROCEDURE — 85025 COMPLETE CBC W/AUTO DIFF WBC: CPT

## 2024-11-14 PROCEDURE — 36415 COLL VENOUS BLD VENIPUNCTURE: CPT

## 2024-11-14 PROCEDURE — 86140 C-REACTIVE PROTEIN: CPT

## 2024-11-14 PROCEDURE — 80061 LIPID PANEL: CPT

## 2024-11-14 PROCEDURE — 80076 HEPATIC FUNCTION PANEL: CPT

## 2024-11-14 PROCEDURE — 83036 HEMOGLOBIN GLYCOSYLATED A1C: CPT

## 2024-11-14 PROCEDURE — 85652 RBC SED RATE AUTOMATED: CPT

## 2024-11-15 LAB
ALBUMIN SERPL BCG-MCNC: 4.4 G/DL (ref 3.5–5.2)
ALP SERPL-CCNC: 59 U/L (ref 40–150)
ALT SERPL W P-5'-P-CCNC: 17 U/L (ref 0–50)
AST SERPL W P-5'-P-CCNC: 23 U/L (ref 0–45)
BILIRUB DIRECT SERPL-MCNC: <0.2 MG/DL (ref 0–0.3)
BILIRUB SERPL-MCNC: 0.2 MG/DL
CHOLEST SERPL-MCNC: 214 MG/DL
CRP SERPL-MCNC: <3 MG/L
FASTING STATUS PATIENT QL REPORTED: ABNORMAL
HDLC SERPL-MCNC: 90 MG/DL
LDLC SERPL CALC-MCNC: 101 MG/DL
NONHDLC SERPL-MCNC: 124 MG/DL
PROT SERPL-MCNC: 7.4 G/DL (ref 6.4–8.3)
TRIGL SERPL-MCNC: 114 MG/DL

## 2024-11-19 ENCOUNTER — VIRTUAL VISIT (OUTPATIENT)
Dept: PHARMACY | Facility: CLINIC | Age: 54
End: 2024-11-19
Attending: INTERNAL MEDICINE
Payer: COMMERCIAL

## 2024-11-19 VITALS — WEIGHT: 125 LBS | HEIGHT: 63 IN | BODY MASS INDEX: 22.15 KG/M2

## 2024-11-19 DIAGNOSIS — K50.00 CROHN'S DISEASE OF SMALL INTESTINE WITHOUT COMPLICATION (H): Primary | ICD-10-CM

## 2024-11-19 ASSESSMENT — PAIN SCALES - GENERAL: PAINLEVEL_OUTOF10: NO PAIN (0)

## 2024-11-19 NOTE — NURSING NOTE
Patient confirms medications and allergies are accurate via patients echeck in completion, and or denies any changes since last reviewed/verified.     Current patient location:  [facility]    Is the patient currently in the state of [place]? YES    Visit mode:VIDEO    If the visit is dropped, the patient can be reconnected by:VIDEO VISIT: Text to cell phone:   Telephone Information:   Mobile [phone]       Will anyone else be joining the visit? NO  (If patient encounters technical issues they should call [phone])    Are changes needed to the allergy or medication list? No    Are refills needed on medications prescribed by this physician? NO    Rooming Documentation:  Questionnaire(s) not done per department protocol    Reason for visit: RECHECK    Denisse JONES

## 2024-11-19 NOTE — PROGRESS NOTES
"Virtual Visit Details    Type of service:  Video Visit     Originating Location (pt. Location): {video visit patient location:589538::\"Home\"}  {PROVIDER LOCATION On-site should be selected for visits conducted from your clinic location or adjoining [facility] hospital, academic office, or other nearby [facility] building. Off-site should be selected for all other provider locations, including home:190643}  Distant Location (provider location):  {virtual location provider:857229}  Platform used for Video Visit: {Virtual Visit Platforms:705534::\"[employer]iantLogic\"}  "

## 2024-11-19 NOTE — PROGRESS NOTES
Medication Therapy Management (MTM) Encounter    ASSESSMENT:                            Medication Adherence/Access: No issues identified.    Crohn's Disease:  Ceci would benefit from continued treatment with Stelara 90 mg every 8 weeks. They are up to date on routine maintenance labs. They are due for annual tuberculosis screening. No access issues for their advanced therapy are present. She is up to date on vaccines. Health maintenance was not comprehensively reviewed with this visit.    She is travelling to [facility] in near future. She is interested in a travel medicine referral which was placed with this encounter.     PLAN:                            Continue medications as directed.  You are up to date on vaccines!  I will reach out to [facility] and take steps to coordinate you receiving a refill prior to 12/20/2024. I will send a separate [employer] message with more information in near future.  I have put in a referral for you to meet with Travel clinic at [facility]. You can call their number at [phone] to schedule.     Follow-up: 5/13/2025 at 2:30 PM (telephone)    SUBJECTIVE/OBJECTIVE:                          Ceci Lopez is a 53 year old female seen for a follow-up visit.       Reason for visit: Stelara follow-up visit.    Allergies/ADRs: Reviewed in chart  Past Medical History: Reviewed in chart  Tobacco: She reports that she has never smoked. She has never been exposed to tobacco smoke. She has never used smokeless tobacco.  Alcohol: none      Medication Adherence/Access: no issues reported.    Crohn's Disease:   Stelara 90 mg every 8 weeks     Keeps track of injections on calendar. Also gets a text message from [facility]. Denies side effects or injection site reactions.     She will be travelling to [facility] on December 24th, and she will return briefly on January 1st. Her next Stelara injection is due 12/28/2024. I suggested she administer  it a week early. She will also be travelling to [facility], she is wondering about vaccinations, precautions, and altitude sickness treatment. She is interested in referral to travel medicine.    PRO-3 for Crohn's Disease    Please select the one best answer for the patient's ability at this time     Over the past week, how many liquid or soft stools have you had on average per day?   0 (When scoring, multiply number by 2=0)   Over the past week, please rate your average abdominal pain  None : 0 points  3. Over the past week, please rate your general well-being    Generally Well: 0 points    Score: 0  <13: Remission  13-21: Mild Activity   22-52: Moderate Activity  >/= 53: Severe Activity       Prior authorization status: Stelara 1/56 approved through 4/1/2025  Original start date: Stelara IV infusion January 2021  Last dose: 11/2/2024  Next dose: 12/28/2024      Last provider visit: 10/23/2024 GIO Haynes  Next provider visit: 7/10/2025 MD Ant  Last labs completed: 11/14/2024  Lab frequency: every 3 months              - standing labs yes CBC with platelets & diff, ESR, CRP, hepatic function panel renewed with this encounter  Next labs due: 2/14/2025     Last IBD Health Maintenance Review: 5/21/2024      IBD Health Maintenance     Vaccinations:  All patients on biologics should avoid live vaccines unless specifically indicated.    -- Influenza (every year)- last 10/2024  -- TdaP (every 10 years)- last 12/2019  -- Pneumococcal Pneumonia               Prevnar-13: 7/25/2018              Pneumovax-23: 9/25/2018              Prevnar-20: 7/11/2022  -- COVID-19 4/2021, 4/2021, 9/2021, 2/2022, 9/2022, 4/2023, 9/2023, 10/2024     One time confirmation of immunity or serologies:  -- Hepatitis A (serologies or immunizations)- 3/2009, 12/2019  -- Hepatitis B (serologies or immunizations)- serology indicate immunity 2021  -- Varicella/Zoster               Varicella/Zoster- Shingrix 4/2023, 9/2023  -- MMR- 7/1991, 5/1985  --  "Meningococcal meningitis (all patients at risk for meningitis)-- Menactra 3/2009     Due to the immunosuppression in this patient, I would not advise administration of live vaccines such as varicella/VZV, intranasal influenza, MMR, or yellow fever vaccine (if traveling).        Today's Vitals: Ht 5' 3\" (1.6 m)   Wt 125 lb (56.7 kg)   BMI 22.14 kg/m    ----------------      I spent 20 minutes with this patient today. All changes were made via collaborative practice agreement with Ruperto Cain MD. A copy of the visit note was provided to the patient's provider(s).    A summary of these recommendations was sent via [employer].    Miladys LewisD, [facility]  [facility] Pharmacist   [facility]  Phone: [phone]    Telemedicine Visit Details  The patient's medications can be safely assessed via a telemedicine encounter.  Type of service:  Video Conference via [employer]  Originating Location (pt. Location): Home    Distant Location (provider location):  Off-site  Start Time:  3:00 PM  End Time:  3:20 PM     Medication Therapy Recommendations  No medication therapy recommendations to display       "

## 2024-11-20 NOTE — PATIENT INSTRUCTIONS
"Recommendations from today's MTM visit:                                                      Continue medications as directed.  You are up to date on vaccines!  I will reach out to [facility] and take steps to coordinate you receiving a refill prior to 12/20/2024. I will send a separate Front Desk HQ message with more information in near future.  I have put in a referral for you to meet with Travel clinic at [facility]. You can call their number at [phone] to schedule.     Follow-up: 5/13/2025 at 2:30 PM (telephone)    It was great speaking with you today.  I value your experience and would be very thankful for your time in providing feedback in our clinic survey. In the next few days, you may receive an email or text message from [employer] with a link to a survey related to your  clinical pharmacist.\"     To schedule another MTM appointment, please call the clinic directly or you may call the MTM scheduling line at [phone] or toll-free at [phone].     My Clinical Pharmacist's contact information:                                                      Please feel free to contact me with any questions or concerns you have.      Claudio Vera, PharmD, BCPS  MTM Pharmacist   [facility]  Phone: [phone]   "

## 2024-12-05 ENCOUNTER — TELEPHONE (OUTPATIENT)
Dept: DERMATOLOGY | Facility: CLINIC | Age: 54
End: 2024-12-05
Payer: COMMERCIAL

## 2024-12-05 NOTE — TELEPHONE ENCOUNTER
12/5 Left Voicemail (1st Attempt) and Sent Mychart (1st Attempt) for the patient to call back and schedule the following:    Appointment type: Return Dermatology  Provider: Carl   Return date: 3/14/2025  Specialty phone number: [phone]  Additional appointment(s) needed: n/a  Additonal Notes: n/a

## 2025-01-16 ENCOUNTER — TELEPHONE (OUTPATIENT)
Dept: GASTROENTEROLOGY | Facility: CLINIC | Age: 55
End: 2025-01-16
Payer: COMMERCIAL

## 2025-01-16 NOTE — TELEPHONE ENCOUNTER
"Endoscopy Scheduling Screen    Have you had any respiratory illness or flu-like symptoms in the last 10 days?  No    What is your communication preference for Instructions and/or Bowel Prep?   MyChart    What insurance is in the chart?  Other:  [facility]    Ordering/Referring Provider: Eduar Haynes PA-C in [facility]   (If ordering provider performs procedure, schedule with ordering provider unless otherwise instructed. )    BMI: Estimated body mass index is 22.85 kg/m  as calculated from the following:    Height as of 12/13/24: 1.6 m (5' 3\").    Weight as of 12/13/24: 58.5 kg (129 lb).     Sedation Ordered  MAC/deep sedation.   BMI<= 45 45 < BMI <= 48 48 < BMI < = 50  BMI > 50   No Restrictions No MG ASC  No [facility]  [place] [facility] with exceptions Hospital Only OR Only       Do you have a history of malignant hyperthermia?  No    (Females) Are you currently pregnant?   No     Have you been diagnosed or told you have pulmonary hypertension?   No    Do you have an LVAD?  No    Have you been told you have moderate to severe sleep apnea?  No.    Have you been told you have COPD, asthma, or any other lung disease?  No    Do you have any heart conditions?  No     Have you ever had or are you waiting for an organ transplant?  No. Continue scheduling, no site restrictions.    Have you had a stroke or transient ischemic attack (TIA aka \"mini stroke\" in the last 6 months?   No    Have you been diagnosed with or been told you have cirrhosis of the liver?   No.    Are you currently on dialysis?   No    Do you need assistance transferring?   No    BMI: Estimated body mass index is 22.85 kg/m  as calculated from the following:    Height as of 12/13/24: 1.6 m (5' 3\").    Weight as of 12/13/24: 58.5 kg (129 lb).     Is patients BMI > 40 and scheduling location [facility]?  No    Do you take an injectable or oral medication for weight loss or diabetes (excluding insulin)?  No    Do you take the medication Naltrexone?  No    Do you " take blood thinners?  No       Prep   Are you currently on dialysis or do you have chronic kidney disease?  No    Do you have a diagnosis of diabetes?  No    Do you have a diagnosis of cystic fibrosis (CF)?  No    On a regular basis do you go 3 -5 days between bowel movements?  No    BMI > 40?  No    Preferred Pharmacy:    [id number] - [place], [place] - [address] AT [facility] [address]  [address]  [facility] [address]  Phone: [phone] Fax: [phone]      Final Scheduling Details     Procedure scheduled  Colonoscopy    Surgeon:  Ant - Per Order    Date of procedure:  5.5.25     Pre-OP / PAC:   No - Not required for this site.    Location  [facility] - ASC - Per order.    Sedation   MAC/Deep Sedation - Per order.      Patient Reminders:   You will receive a call from a Nurse to review instructions and health history.  This assessment must be completed prior to your procedure.  Failure to complete the Nurse assessment may result in the procedure being cancelled.      On the day of your procedure, please designate an adult(s) who can drive you home stay with you for the next 24 hours. The medicines used in the exam will make you sleepy. You will not be able to drive.      You cannot take public transportation, ride share services, or non-medical taxi service without a responsible caregiver.  Medical transport services are allowed with the requirement that a responsible caregiver will receive you at your destination.  We require that drivers and caregivers are confirmed prior to your procedure.

## 2025-02-11 ENCOUNTER — LAB (OUTPATIENT)
Dept: LAB | Facility: CLINIC | Age: 55
End: 2025-02-11
Payer: COMMERCIAL

## 2025-02-11 DIAGNOSIS — K50.00 CROHN'S DISEASE OF SMALL INTESTINE WITHOUT COMPLICATION (H): ICD-10-CM

## 2025-02-11 LAB
ALBUMIN SERPL BCG-MCNC: 4.2 G/DL (ref 3.5–5.2)
ALP SERPL-CCNC: 63 U/L (ref 40–150)
ALT SERPL W P-5'-P-CCNC: 18 U/L (ref 0–50)
AST SERPL W P-5'-P-CCNC: 24 U/L (ref 0–45)
BASOPHILS # BLD AUTO: 0 10E3/UL (ref 0–0.2)
BASOPHILS NFR BLD AUTO: 0 %
BILIRUB DIRECT SERPL-MCNC: <0.2 MG/DL (ref 0–0.3)
BILIRUB SERPL-MCNC: 0.2 MG/DL
CRP SERPL-MCNC: <3 MG/L
EOSINOPHIL # BLD AUTO: 0.1 10E3/UL (ref 0–0.7)
EOSINOPHIL NFR BLD AUTO: 1 %
ERYTHROCYTE [DISTWIDTH] IN BLOOD BY AUTOMATED COUNT: 11.8 % (ref 10–15)
HCT VFR BLD AUTO: 38.1 % (ref 35–47)
HGB BLD-MCNC: 12.6 G/DL (ref 11.7–15.7)
IMM GRANULOCYTES # BLD: 0 10E3/UL
IMM GRANULOCYTES NFR BLD: 0 %
LYMPHOCYTES # BLD AUTO: 1.8 10E3/UL (ref 0.8–5.3)
LYMPHOCYTES NFR BLD AUTO: 35 %
MCH RBC QN AUTO: 31.1 PG (ref 26.5–33)
MCHC RBC AUTO-ENTMCNC: 33.1 G/DL (ref 31.5–36.5)
MCV RBC AUTO: 94 FL (ref 78–100)
MONOCYTES # BLD AUTO: 0.3 10E3/UL (ref 0–1.3)
MONOCYTES NFR BLD AUTO: 7 %
NEUTROPHILS # BLD AUTO: 2.9 10E3/UL (ref 1.6–8.3)
NEUTROPHILS NFR BLD AUTO: 56 %
PLATELET # BLD AUTO: 273 10E3/UL (ref 150–450)
PROT SERPL-MCNC: 7.4 G/DL (ref 6.4–8.3)
RBC # BLD AUTO: 4.05 10E6/UL (ref 3.8–5.2)
WBC # BLD AUTO: 5.1 10E3/UL (ref 4–11)

## 2025-02-11 PROCEDURE — 36415 COLL VENOUS BLD VENIPUNCTURE: CPT

## 2025-02-11 PROCEDURE — 86481 TB AG RESPONSE T-CELL SUSP: CPT

## 2025-02-11 PROCEDURE — 80076 HEPATIC FUNCTION PANEL: CPT

## 2025-02-11 PROCEDURE — 85025 COMPLETE CBC W/AUTO DIFF WBC: CPT

## 2025-02-11 PROCEDURE — 86140 C-REACTIVE PROTEIN: CPT

## 2025-02-13 LAB
GAMMA INTERFERON BACKGROUND BLD IA-ACNC: 0.01 IU/ML
M TB IFN-G BLD-IMP: NEGATIVE
M TB IFN-G CD4+ BCKGRND COR BLD-ACNC: 9.99 IU/ML
MITOGEN IGNF BCKGRD COR BLD-ACNC: 0 IU/ML
MITOGEN IGNF BCKGRD COR BLD-ACNC: 0 IU/ML
QUANTIFERON MITOGEN: 10 IU/ML
QUANTIFERON NIL TUBE: 0.01 IU/ML
QUANTIFERON TB1 TUBE: 0.01 IU/ML
QUANTIFERON TB2 TUBE: 0.01

## 2025-02-18 DIAGNOSIS — N95.1 MENOPAUSAL SYNDROME (HOT FLASHES): ICD-10-CM

## 2025-02-20 RX ORDER — ESTRADIOL 0.04 MG/D
1 PATCH, EXTENDED RELEASE TRANSDERMAL
Qty: 24 PATCH | Refills: 3 | Status: SHIPPED | OUTPATIENT
Start: 2025-02-20

## 2025-02-20 RX ORDER — PROGESTERONE 100 MG/1
100 CAPSULE ORAL DAILY
Qty: 90 CAPSULE | Refills: 3 | Status: SHIPPED | OUTPATIENT
Start: 2025-02-20

## 2025-03-14 ENCOUNTER — MYC REFILL (OUTPATIENT)
Dept: PHARMACY | Facility: CLINIC | Age: 55
End: 2025-03-14
Payer: COMMERCIAL

## 2025-03-14 ENCOUNTER — MYC MEDICAL ADVICE (OUTPATIENT)
Dept: OBGYN | Facility: CLINIC | Age: 55
End: 2025-03-14
Payer: COMMERCIAL

## 2025-03-14 DIAGNOSIS — B00.9 HSV-1 (HERPES SIMPLEX VIRUS 1) INFECTION: Primary | ICD-10-CM

## 2025-03-14 DIAGNOSIS — B00.9 HERPES SIMPLEX VIRUS INFECTION: ICD-10-CM

## 2025-03-14 RX ORDER — ACYCLOVIR 400 MG/1
400 TABLET ORAL EVERY 8 HOURS
Qty: 15 TABLET | Refills: 0 | Status: CANCELLED | OUTPATIENT
Start: 2025-03-14

## 2025-03-17 ENCOUNTER — TELEPHONE (OUTPATIENT)
Dept: GASTROENTEROLOGY | Facility: CLINIC | Age: 55
End: 2025-03-17
Payer: COMMERCIAL

## 2025-03-17 RX ORDER — ACYCLOVIR 400 MG/1
400 TABLET ORAL EVERY 8 HOURS
Qty: 15 TABLET | Refills: 0 | Status: SHIPPED | OUTPATIENT
Start: 2025-03-17 | End: 2025-03-22

## 2025-03-17 NOTE — TELEPHONE ENCOUNTER
acyclovir (ZOVIRAX) 400 MG tablet : addressed in other encounter  - refill sent by other provider/service  *see chart for messages

## 2025-03-17 NOTE — TELEPHONE ENCOUNTER
PA Initiation    Medication: USTEKINUMAB 90 MG/ML [facility]  Insurance Company: [employer] ([facility]) - Phone [phone] Fax [phone]  Pharmacy Filling the Rx: [facility] - [place], [place] - [address]  Filling Pharmacy Phone:    Filling Pharmacy Fax:    Start Date: 3/17/2025  [id number]

## 2025-03-19 NOTE — TELEPHONE ENCOUNTER
Prior Authorization Approval    Medication: USTEKINUMAB 90 MG/ML [facility] SOSY  Authorization Effective Date: 3/17/2025  Authorization Expiration Date: 3/17/2026  Approved Dose/Quantity: 1/56  Reference #: [id number]   Insurance Company: [employer] ([facility]) - Phone [phone] Fax [phone]  Expected CoPay: $    CoPay Card Available:      Financial Assistance Needed:    Which Pharmacy is filling the prescription: [facility] - [place], [place] - [address]  Pharmacy Notified:    Patient Notified:  renewal

## 2025-04-09 ENCOUNTER — MYC MEDICAL ADVICE (OUTPATIENT)
Dept: GASTROENTEROLOGY | Facility: CLINIC | Age: 55
End: 2025-04-09
Payer: COMMERCIAL

## 2025-04-09 NOTE — TELEPHONE ENCOUNTER
"MyChart Msg was forwarded from pt in regards to future bowel prep.     \"Hi there -- I hope you're all doing well! I'm reaching out because I remember that Dr. Cain was going to recommend a \"double prep\" before my next colonoscopy (scheduled for May 5).      I'm wondering what that might look like. I know from experience (including experience drinking a substance before a bowel scan in the hospital) that things tend to move more slowly through my system. Before my last colonoscopy, it seemed like there wasn't enough time for everything to get through my system before the procedure.     So, instead of taking MORE laxative medication with the prep, I'd prefer to start the prep earlier to give it time to fully work through my system.      I'm asking now because I've got a family obligation the weekend right before the colonoscopy, and I'm wondering what the prep will look like that weekend.      Could you send me the prep instructions now, or give me an idea of the plan? Thank you so much!       Reply was given to pt explaining the prep is done over a course of two days with additional prep taken. Prep instructions for extended Golytely were included in reply.     Marilee Arriaga RN   Pre-procedure assessment RN  "

## 2025-04-10 ENCOUNTER — TELEPHONE (OUTPATIENT)
Dept: GASTROENTEROLOGY | Facility: CLINIC | Age: 55
End: 2025-04-10
Payer: COMMERCIAL

## 2025-04-10 NOTE — TELEPHONE ENCOUNTER
"Patient sent in [employer] message asking for bowel prep instructions for procedure in May. Per chart review/previous colonoscopy report, patient had \"fair\" prep and it was recommended she complete an extended prep for future colonoscopies. The extended prep was sent to the patient and she replied not wanting to do extra laxatives and wants to do a gatorade miralax prep. Of note patient has only one kidney, however labs appear to be WNL. Message sent to Dr. Cain about the patient's concerns. Patient ended up rescheduling to later in the year in August when she has more time to complete the extended prep. Patient message was: \"I just grabbed his next available on August 18. If I have to do extended prep that works a lot better for me, due to the family obligations I have over the weekend before May 5. I am having no symptoms and feel great, so I hope he's okay with pushing it out a few months.\"    Addendum in red:    Ruperto Cain MD [place], Arica, RN  Hi there. Her prep was fair. So ideally she would do the extra prep. If she absolutely cannot do it we can see how it goes again with the regular prep          "

## 2025-04-10 NOTE — TELEPHONE ENCOUNTER
Caller: Ceci Lopez   Reason for Reschedule/Cancellation (please be detailed, any staff messages or encounters to note?):     Per pt-- change date     Did you cancel or rescheduled an EUS procedure? No.    Is screening questionnaire older than 3 months from the reschedule date.   If Yes, please complete screening questionnaire. No    Prior to reschedule please review:  Ordering Provider:    Eduar Haynes PA-C     Sedation Determined: MAC   Does patient have any ASC Exclusions, please identify?: n       Notes on Cancelled Procedure:  Procedure:Lower Endoscopy [Colonoscopy]   Date: 05/05/2025  Location:[facility]; [address] [place] [address], [address] [place] [address]  Surgeon: Ant         Rescheduled: yes   Procedure: Lower Endoscopy [Colonoscopy]  Date: 08/18/2025  Location: [facility]; [address] [place] [address], [address] [place] [address]  Surgeon: Ant   Sedation Level Scheduled  MAC          Reason for Sedation Level per order   Prep/Instructions updated and sent: scooter     Does patient need PAC or Pre -Op Rescheduled? : n

## 2025-05-08 ENCOUNTER — LAB (OUTPATIENT)
Dept: LAB | Facility: CLINIC | Age: 55
End: 2025-05-08
Payer: COMMERCIAL

## 2025-05-08 DIAGNOSIS — K50.00 CROHN'S DISEASE OF SMALL INTESTINE WITHOUT COMPLICATION (H): ICD-10-CM

## 2025-05-08 LAB
ALBUMIN SERPL BCG-MCNC: 4.3 G/DL (ref 3.5–5.2)
ALP SERPL-CCNC: 59 U/L (ref 40–150)
ALT SERPL W P-5'-P-CCNC: 15 U/L (ref 0–50)
AST SERPL W P-5'-P-CCNC: 23 U/L (ref 0–45)
BASOPHILS # BLD AUTO: 0 10E3/UL (ref 0–0.2)
BASOPHILS NFR BLD AUTO: 0 %
BILIRUB DIRECT SERPL-MCNC: 0.09 MG/DL (ref 0–0.3)
BILIRUB SERPL-MCNC: 0.2 MG/DL
CRP SERPL-MCNC: <3 MG/L
EOSINOPHIL # BLD AUTO: 0 10E3/UL (ref 0–0.7)
EOSINOPHIL NFR BLD AUTO: 1 %
ERYTHROCYTE [DISTWIDTH] IN BLOOD BY AUTOMATED COUNT: 12.1 % (ref 10–15)
HCT VFR BLD AUTO: 37.4 % (ref 35–47)
HGB BLD-MCNC: 12.5 G/DL (ref 11.7–15.7)
IMM GRANULOCYTES # BLD: 0 10E3/UL
IMM GRANULOCYTES NFR BLD: 0 %
LYMPHOCYTES # BLD AUTO: 1.7 10E3/UL (ref 0.8–5.3)
LYMPHOCYTES NFR BLD AUTO: 36 %
MCH RBC QN AUTO: 31.4 PG (ref 26.5–33)
MCHC RBC AUTO-ENTMCNC: 33.4 G/DL (ref 31.5–36.5)
MCV RBC AUTO: 94 FL (ref 78–100)
MONOCYTES # BLD AUTO: 0.3 10E3/UL (ref 0–1.3)
MONOCYTES NFR BLD AUTO: 6 %
NEUTROPHILS # BLD AUTO: 2.7 10E3/UL (ref 1.6–8.3)
NEUTROPHILS NFR BLD AUTO: 57 %
PLATELET # BLD AUTO: 257 10E3/UL (ref 150–450)
PROT SERPL-MCNC: 7.1 G/DL (ref 6.4–8.3)
RBC # BLD AUTO: 3.98 10E6/UL (ref 3.8–5.2)
WBC # BLD AUTO: 4.7 10E3/UL (ref 4–11)

## 2025-05-09 ENCOUNTER — RESULTS FOLLOW-UP (OUTPATIENT)
Dept: GASTROENTEROLOGY | Facility: CLINIC | Age: 55
End: 2025-05-09

## 2025-05-13 ENCOUNTER — VIRTUAL VISIT (OUTPATIENT)
Dept: PHARMACY | Facility: CLINIC | Age: 55
End: 2025-05-13
Attending: INTERNAL MEDICINE
Payer: COMMERCIAL

## 2025-05-13 VITALS — BODY MASS INDEX: 22.15 KG/M2 | HEIGHT: 63 IN | WEIGHT: 125 LBS

## 2025-05-13 DIAGNOSIS — K50.80 CROHN'S DISEASE OF BOTH SMALL AND LARGE INTESTINE WITHOUT COMPLICATION (H): Primary | ICD-10-CM

## 2025-05-13 NOTE — NURSING NOTE
Current patient location: at work in [facility]     Is the patient currently in the state of [place]? YES    Visit mode: VIDEO    If the visit is dropped, the patient can be reconnected by:TELEPHONE VISIT: Phone number: [phone]    Will anyone else be joining the visit? NO  (If patient encounters technical issues they should call [phone])    Are changes needed to the allergy or medication list? No    Are refills needed on medications prescribed by this physician? NO    Rooming Documentation:  Questionnaire(s) not pre-assigned    Reason for visit: RECHECK    Vianey JONES

## 2025-05-13 NOTE — PATIENT INSTRUCTIONS
"Recommendations from today's MTM visit:                                                      Ceci will consider the following vaccines:  Updated COVID-19 vaccine  Reminder to avoid LIVE vaccines  Continue calcium and vitamin D.     Follow-up: 8/4/2025 at 10:30 AM (telephone)    It was great speaking with you today.  I value your experience and would be very thankful for your time in providing feedback in our clinic survey. In the next few days, you may receive an email or text message from [facility] [employer] with a link to a survey related to your  clinical pharmacist.\"     To schedule another MTM appointment, please call the clinic directly or you may call the MTM scheduling line at [phone] or toll-free at [phone].     My Clinical Pharmacist's contact information:                                                      Please feel free to contact me with any questions or concerns you have.      Claudio Vera, PharmD, BCPS  MTM Pharmacist   [facility]  Phone: [phone]   "

## 2025-05-13 NOTE — PROGRESS NOTES
"Virtual Visit Details    Type of service:  Video Visit     Originating Location (pt. Location): {video visit patient location:794790::\"Home\"}  {PROVIDER LOCATION On-site should be selected for visits conducted from your clinic location or adjoining [facility] hospital, academic office, or other nearby [facility] building. Off-site should be selected for all other provider locations, including home:[id number]}  Distant Location (provider location):  {virtual location provider:435226}  Platform used for Video Visit: {Virtual Visit Platforms:407187::\"[employer]\"}  "

## 2025-05-13 NOTE — PROGRESS NOTES
Medication Therapy Management (MTM) Encounter    ASSESSMENT:                            Medication Adherence/Access: No issues identified.    Crohn's Disease:  Ceci would benefit from continued treatment with Stelara 90 mg every 8 weeks. They are up to date on routine maintenance labs. They are up to date on annual tuberculosis screening.  No access issues for their advanced therapy are present. Discussed COVID-19 vaccine booster. Continue calcium and vitamin D supplementation. Continue routine skin checks per dermatology.    PLAN:                            Ceci will consider the following vaccines:  Updated COVID-19 vaccine  Reminder to avoid LIVE vaccines  Continue calcium and vitamin D.     Follow-up: 8/4/2025 at 10:30 AM (telephone)    SUBJECTIVE/OBJECTIVE:                          Ceci Lopez is a 54 year old female seen for a follow-up visit.       Reason for visit: Stelara follow-up visit.    Allergies/ADRs: Reviewed in chart  Past Medical History: Reviewed in chart  Tobacco: She reports that she has never smoked. She has never been exposed to tobacco smoke. She has never used smokeless tobacco.  Alcohol: none      Medication Adherence/Access: no issues reported.    Crohn's Disease:   Stelara 90 mg every 8 weeks     Keeps track of injections on calendar. Also gets a text message from [facility]. Denies side effects or injection site reactions. She did ask about her colon prep, we have scheduled a follow-up visit to discuss this.     PRO-3 for Crohn's Disease    Please select the one best answer for the patient's ability at this time     Over the past week, how many liquid or soft stools have you had on average per day?   0 (When scoring, multiply number by 2=0)   Over the past week, please rate your average abdominal pain  None : 0 points  3. Over the past week, please rate your general well-being    Generally Well: 0 points    Score: 0  <13: Remission  13-21: Mild Activity   22-52:  Moderate Activity  >/= 53: Severe Activity      Prior authorization status: Stelara 1/56 approved through 3/17/2026  Original start date: Stelara IV infusion January 2021    Last provider visit: 10/23/2024 GIO Haynes  Next provider visit: 9/24/2025 MD Ant  Last labs completed: 5/8/2025  Lab frequency: every 3 months              - standing labs yes CBC with platelets & diff, ESR, CRP, hepatic function panel renewed with this encounter  Next labs due: 8/8/2025     Last IBD Health Maintenance Review: 5/21/2024        IBD Health Maintenance     Vaccinations:  All patients on biologics should avoid live vaccines unless specifically indicated.    -- Influenza (every year)- last 10/2024  -- TdaP (every 10 years)- last 12/2019  -- Pneumococcal Pneumonia               Prevnar-13: 7/25/2018              Pneumovax-23: 9/25/2018              Prevnar-20: 7/11/2022  -- COVID-19 4/2021, 4/2021, 9/2021, 2/2022, 9/2022, 4/2023, 9/2023, 10/2024     One time confirmation of immunity or serologies:  -- Hepatitis A (serologies or immunizations)- 3/2009, 12/2019  -- Hepatitis B (serologies or immunizations)- serology indicate immunity 2021  -- Varicella/Zoster               Varicella/Zoster- Shingrix 4/2023, 9/2023  -- MMR- 7/1991, 5/1985  -- Meningococcal meningitis (all patients at risk for meningitis)-- Menactra 3/2009     Due to the immunosuppression in this patient, I would not advise administration of live vaccines such as varicella/VZV, intranasal influenza, MMR, or yellow fever vaccine (if traveling).      Immunosuppressive Screening:  -- Hep B Surface Antibody serologies indicate immunity 8/25/2021  -- Hep B Surface Antigen non-reactive 8/25/2021  -- Hep B Core Antibody non-reactive 8/25/2021  -- Hep C Antibody non-reactive 10/26/2023  -- Yearly assessment of TB negative 2/11/2025      Lab Results   Component Value Date    AUSAB 740.94 (H) 08/25/2021    HEPBANG Nonreactive 08/25/2021    HBCAB Nonreactive 08/25/2021     HCVAB Nonreactive 10/26/2023    TBRES Negative 02/11/2025       Bone mineral density screening   -- Recommend all patients supplement with calcium and vitamin D  -- Last Dexa 7/11/2024    Cancer Screening:  Colon cancer screening:  Given colonic involvement, recommend patient undergo regular dysplasia surveillance   Next dysplasia screening is recommended 2025.     Cervical cancer screening: Annual due to immunosupression- last 8/29/2024     Skin cancer screening: Annual visual exam of skin by dermatologist since patient is immunocompromised- last 4/25/2025    Depression Screening:    PHQ-2 Score:         4/25/2025     2:41 PM 4/18/2024     2:04 PM   PHQ-2 ( 1999 [employer])   Q1: Little interest or pleasure in doing things 0 0   Q2: Feeling down, depressed or hopeless 0 0   PHQ-2 Score 0 0   Q1: Little interest or pleasure in doing things  Not at all   Q2: Feeling down, depressed or hopeless  Not at all   PHQ-2 Score  0       Misc:  -- Avoid tobacco use  -- Avoid NSAIDs as there is potentially a 25% chance of causing an IBD flare    ----------------      I spent 20 minutes with this patient today. All changes were made via collaborative practice agreement with Ruperto Cain MD.     A summary of these recommendations was sent via [employer].    Claudio Vera, PharmD, BCPS  MT Pharmacist   [facility]  Phone: [phone]    Telemedicine Visit Details  The patient's medications can be safely assessed via a telemedicine encounter.  Type of service:  Video Conference via [employer]  Originating Location (pt. Location): Home    Distant Location (provider location):  Off-site  Start Time: 2:30 PM  End Time: 2:50 PM     Medication Therapy Recommendations  No medication therapy recommendations to display

## 2025-05-14 ENCOUNTER — IMMUNIZATION (OUTPATIENT)
Dept: FAMILY MEDICINE | Facility: CLINIC | Age: 55
End: 2025-05-14
Payer: COMMERCIAL

## 2025-05-14 DIAGNOSIS — Z23 ENCOUNTER FOR IMMUNIZATION: Primary | ICD-10-CM

## 2025-05-14 PROCEDURE — 99207 PR NO CHARGE NURSE ONLY: CPT

## 2025-05-14 PROCEDURE — 90480 ADMN SARSCOV2 VAC 1/ONLY CMP: CPT

## 2025-05-14 PROCEDURE — 91320 SARSCV2 VAC 30MCG TRS-SUC IM: CPT

## 2025-05-14 NOTE — PROGRESS NOTES
Prior to immunization administration, verified patients identity using patient s name and date of birth. Please see Immunization Activity for additional information.     Is the patient's temperature normal (100.5 or less)? Yes     Patient MEETS CRITERIA. PROCEED with vaccine administration.          5/14/2025   COVID   Have you had myocarditis or pericarditis (inflammation of or around the heart muscle) after getting a COVID-19 vaccine? No   Have you had a serious reaction to a COVID vaccine or something in a COVID vaccine, like polyethylene glycol (PEG) or polysorbate? No   Have you had multisystem inflammatory syndrome from COVID-19 in the past 90 days? No   Have you received a bone marrow transplant within the previous 3 months? No         Patient MEETS CRITERIA. PROCEED with vaccine administration.        Patient instructed to remain in clinic for 15 minutes afterwards, and to report any adverse reactions.      Link to Ancillary Visit Immunization Standing Orders SmartSet     Screening performed by DARREN DELATORRE on 5/14/2025 at 11:43 AM.

## 2025-08-04 ENCOUNTER — ANCILLARY PROCEDURE (OUTPATIENT)
Dept: BONE DENSITY | Facility: CLINIC | Age: 55
End: 2025-08-04
Attending: INTERNAL MEDICINE
Payer: COMMERCIAL

## 2025-08-04 ENCOUNTER — VIRTUAL VISIT (OUTPATIENT)
Dept: PHARMACY | Facility: CLINIC | Age: 55
End: 2025-08-04
Attending: INTERNAL MEDICINE
Payer: COMMERCIAL

## 2025-08-04 VITALS — HEIGHT: 63 IN | BODY MASS INDEX: 22.15 KG/M2 | WEIGHT: 125 LBS

## 2025-08-04 DIAGNOSIS — M85.89 OSTEOPENIA OF MULTIPLE SITES: ICD-10-CM

## 2025-08-04 DIAGNOSIS — K50.80 CROHN'S DISEASE OF BOTH SMALL AND LARGE INTESTINE WITHOUT COMPLICATION (H): Primary | ICD-10-CM

## 2025-08-04 PROCEDURE — 77080 DXA BONE DENSITY AXIAL: CPT | Performed by: INTERNAL MEDICINE

## 2025-08-04 ASSESSMENT — PAIN SCALES - GENERAL: PAINLEVEL_OUTOF10: NO PAIN (0)

## 2025-08-18 ENCOUNTER — HOSPITAL ENCOUNTER (OUTPATIENT)
Facility: AMBULATORY SURGERY CENTER | Age: 55
Discharge: HOME | End: 2025-08-18
Attending: INTERNAL MEDICINE | Admitting: INTERNAL MEDICINE
Payer: COMMERCIAL

## 2025-08-18 ENCOUNTER — ANESTHESIA (OUTPATIENT)
Dept: SURGERY | Facility: AMBULATORY SURGERY CENTER | Age: 55
End: 2025-08-18
Payer: COMMERCIAL

## 2025-08-18 ENCOUNTER — ANESTHESIA EVENT (OUTPATIENT)
Dept: SURGERY | Facility: AMBULATORY SURGERY CENTER | Age: 55
End: 2025-08-18
Payer: COMMERCIAL

## 2025-08-18 VITALS
RESPIRATION RATE: 16 BRPM | BODY MASS INDEX: 22.32 KG/M2 | DIASTOLIC BLOOD PRESSURE: 69 MMHG | TEMPERATURE: 97.6 F | WEIGHT: 126 LBS | HEART RATE: 52 BPM | OXYGEN SATURATION: 100 % | HEIGHT: 63 IN | SYSTOLIC BLOOD PRESSURE: 110 MMHG

## 2025-08-18 VITALS — HEART RATE: 61 BPM

## 2025-08-18 LAB — COLONOSCOPY: NORMAL

## 2025-08-18 PROCEDURE — 88305 TISSUE EXAM BY PATHOLOGIST: CPT | Mod: 26 | Performed by: PATHOLOGY

## 2025-08-18 PROCEDURE — 88305 TISSUE EXAM BY PATHOLOGIST: CPT | Mod: TC | Performed by: INTERNAL MEDICINE

## 2025-08-18 RX ORDER — SODIUM CHLORIDE, SODIUM LACTATE, POTASSIUM CHLORIDE, CALCIUM CHLORIDE 600; 310; 30; 20 MG/100ML; MG/100ML; MG/100ML; MG/100ML
INJECTION, SOLUTION INTRAVENOUS CONTINUOUS PRN
Status: DISCONTINUED | OUTPATIENT
Start: 2025-08-18 | End: 2025-08-18

## 2025-08-18 RX ORDER — PROPOFOL 10 MG/ML
INJECTION, EMULSION INTRAVENOUS CONTINUOUS PRN
Status: DISCONTINUED | OUTPATIENT
Start: 2025-08-18 | End: 2025-08-18

## 2025-08-18 RX ORDER — NALOXONE HYDROCHLORIDE 0.4 MG/ML
0.4 INJECTION, SOLUTION INTRAMUSCULAR; INTRAVENOUS; SUBCUTANEOUS
Status: DISCONTINUED | OUTPATIENT
Start: 2025-08-18 | End: 2025-08-19 | Stop reason: HOSPADM

## 2025-08-18 RX ORDER — ONDANSETRON 2 MG/ML
4 INJECTION INTRAMUSCULAR; INTRAVENOUS
Status: DISCONTINUED | OUTPATIENT
Start: 2025-08-18 | End: 2025-08-18 | Stop reason: HOSPADM

## 2025-08-18 RX ORDER — PROCHLORPERAZINE MALEATE 10 MG
10 TABLET ORAL EVERY 6 HOURS PRN
Status: DISCONTINUED | OUTPATIENT
Start: 2025-08-18 | End: 2025-08-19 | Stop reason: HOSPADM

## 2025-08-18 RX ORDER — PROPOFOL 10 MG/ML
INJECTION, EMULSION INTRAVENOUS PRN
Status: DISCONTINUED | OUTPATIENT
Start: 2025-08-18 | End: 2025-08-18

## 2025-08-18 RX ORDER — ONDANSETRON 4 MG/1
4 TABLET, ORALLY DISINTEGRATING ORAL EVERY 6 HOURS PRN
Status: DISCONTINUED | OUTPATIENT
Start: 2025-08-18 | End: 2025-08-19 | Stop reason: HOSPADM

## 2025-08-18 RX ORDER — SODIUM CHLORIDE, SODIUM LACTATE, POTASSIUM CHLORIDE, CALCIUM CHLORIDE 600; 310; 30; 20 MG/100ML; MG/100ML; MG/100ML; MG/100ML
INJECTION, SOLUTION INTRAVENOUS CONTINUOUS
Status: DISCONTINUED | OUTPATIENT
Start: 2025-08-18 | End: 2025-08-18 | Stop reason: HOSPADM

## 2025-08-18 RX ORDER — FLUMAZENIL 0.1 MG/ML
0.2 INJECTION, SOLUTION INTRAVENOUS
Status: ACTIVE | OUTPATIENT
Start: 2025-08-18 | End: 2025-08-18

## 2025-08-18 RX ORDER — LIDOCAINE HYDROCHLORIDE 20 MG/ML
INJECTION, SOLUTION INFILTRATION; PERINEURAL PRN
Status: DISCONTINUED | OUTPATIENT
Start: 2025-08-18 | End: 2025-08-18

## 2025-08-18 RX ORDER — NALOXONE HYDROCHLORIDE 0.4 MG/ML
0.2 INJECTION, SOLUTION INTRAMUSCULAR; INTRAVENOUS; SUBCUTANEOUS
Status: DISCONTINUED | OUTPATIENT
Start: 2025-08-18 | End: 2025-08-19 | Stop reason: HOSPADM

## 2025-08-18 RX ORDER — ONDANSETRON 2 MG/ML
4 INJECTION INTRAMUSCULAR; INTRAVENOUS EVERY 6 HOURS PRN
Status: DISCONTINUED | OUTPATIENT
Start: 2025-08-18 | End: 2025-08-19 | Stop reason: HOSPADM

## 2025-08-18 RX ORDER — LIDOCAINE 40 MG/G
CREAM TOPICAL
Status: DISCONTINUED | OUTPATIENT
Start: 2025-08-18 | End: 2025-08-18 | Stop reason: HOSPADM

## 2025-08-18 RX ADMIN — LIDOCAINE HYDROCHLORIDE 60 MG: 20 INJECTION, SOLUTION INFILTRATION; PERINEURAL at 10:39

## 2025-08-18 RX ADMIN — PROPOFOL 20 MG: 10 INJECTION, EMULSION INTRAVENOUS at 10:45

## 2025-08-18 RX ADMIN — SODIUM CHLORIDE, SODIUM LACTATE, POTASSIUM CHLORIDE, CALCIUM CHLORIDE: 600; 310; 30; 20 INJECTION, SOLUTION INTRAVENOUS at 09:42

## 2025-08-18 RX ADMIN — PROPOFOL 40 MG: 10 INJECTION, EMULSION INTRAVENOUS at 10:49

## 2025-08-18 RX ADMIN — PROPOFOL 200 MCG/KG/MIN: 10 INJECTION, EMULSION INTRAVENOUS at 10:39

## 2025-08-18 RX ADMIN — PROPOFOL 80 MG: 10 INJECTION, EMULSION INTRAVENOUS at 10:39

## 2025-08-28 ENCOUNTER — LAB (OUTPATIENT)
Dept: LAB | Facility: CLINIC | Age: 55
End: 2025-08-28
Payer: COMMERCIAL

## 2025-08-28 DIAGNOSIS — K50.00 CROHN'S DISEASE OF SMALL INTESTINE WITHOUT COMPLICATION (H): ICD-10-CM

## 2025-08-28 LAB
ALBUMIN SERPL BCG-MCNC: 4.5 G/DL (ref 3.5–5.2)
ALP SERPL-CCNC: 61 U/L (ref 40–150)
ALT SERPL W P-5'-P-CCNC: 17 U/L (ref 0–50)
AST SERPL W P-5'-P-CCNC: 22 U/L (ref 0–45)
BASOPHILS # BLD AUTO: <0.04 10E3/UL (ref 0–0.2)
BASOPHILS NFR BLD AUTO: 0.4 %
BILIRUB SERPL-MCNC: 0.3 MG/DL
BILIRUBIN DIRECT (ROCHE PRO & PURE): 0.12 MG/DL (ref 0–0.45)
CRP SERPL-MCNC: <3 MG/L
EOSINOPHIL # BLD AUTO: 0.05 10E3/UL (ref 0–0.7)
EOSINOPHIL NFR BLD AUTO: 1 %
ERYTHROCYTE [DISTWIDTH] IN BLOOD BY AUTOMATED COUNT: 11.9 % (ref 10–15)
HCT VFR BLD AUTO: 39 % (ref 35–47)
HGB BLD-MCNC: 12.7 G/DL (ref 11.7–15.7)
IMM GRANULOCYTES # BLD: <0.04 10E3/UL
IMM GRANULOCYTES NFR BLD: 0 %
LYMPHOCYTES # BLD AUTO: 1.78 10E3/UL (ref 0.8–5.3)
LYMPHOCYTES NFR BLD AUTO: 36.7 %
MCH RBC QN AUTO: 30.7 PG (ref 26.5–33)
MCHC RBC AUTO-ENTMCNC: 32.6 G/DL (ref 31.5–36.5)
MCV RBC AUTO: 94.2 FL (ref 78–100)
MONOCYTES # BLD AUTO: 0.28 10E3/UL (ref 0–1.3)
MONOCYTES NFR BLD AUTO: 5.8 %
NEUTROPHILS # BLD AUTO: 2.72 10E3/UL (ref 1.6–8.3)
NEUTROPHILS NFR BLD AUTO: 56.1 %
PLATELET # BLD AUTO: 256 10E3/UL (ref 150–450)
PROT SERPL-MCNC: 7.5 G/DL (ref 6.4–8.3)
RBC # BLD AUTO: 4.14 10E6/UL (ref 3.8–5.2)
WBC # BLD AUTO: 4.85 10E3/UL (ref 4–11)

## (undated) DEVICE — SUCTION MANIFOLD NEPTUNE 2 SYS 1 PORT 702-025-000

## (undated) DEVICE — GOWN IMPERVIOUS 2XL BLUE

## (undated) DEVICE — KIT ENDO TURNOVER/PROCEDURE CARRY-ON 101822

## (undated) DEVICE — ENDO FORCEP BX CAPTURA PRO SPIKE G50696

## (undated) DEVICE — TUBING SUCTION 12"X1/4" N612

## (undated) DEVICE — SOL WATER IRRIG 500ML BOTTLE 2F7113

## (undated) DEVICE — SNARE CAPIVATOR ROUND COLD SNR BX10 M00561101

## (undated) DEVICE — KIT ENDO FIRST STEP DISINFECTANT 200ML W/POUCH EP-4

## (undated) DEVICE — ENDO FORCEP SPIKED SERRATED SHAFT JUMBO 239CM G56998

## (undated) DEVICE — GOWN ISOLATION YELLOW UNIV NOT STERILE 3110PG

## (undated) DEVICE — SPECIMEN CONTAINER 3OZ W/FORMALIN 59901

## (undated) DEVICE — TUBING SUCTION MEDI-VAC 1/4"X20' N620A

## (undated) DEVICE — PAD CHUX UNDERPAD 30X36" P3036C

## (undated) RX ORDER — TRIAMCINOLONE ACETONIDE 40 MG/ML
INJECTION, SUSPENSION INTRA-ARTICULAR; INTRAMUSCULAR
Status: DISPENSED
Start: 2023-09-13

## (undated) RX ORDER — LIDOCAINE HYDROCHLORIDE 10 MG/ML
INJECTION, SOLUTION INFILTRATION; PERINEURAL
Status: DISPENSED
Start: 2023-09-13